# Patient Record
Sex: FEMALE | Race: WHITE | Employment: FULL TIME | URBAN - METROPOLITAN AREA
[De-identification: names, ages, dates, MRNs, and addresses within clinical notes are randomized per-mention and may not be internally consistent; named-entity substitution may affect disease eponyms.]

---

## 2017-02-14 ENCOUNTER — HOSPITAL ENCOUNTER (OUTPATIENT)
Dept: MAMMOGRAPHY | Age: 72
Discharge: HOME | End: 2017-02-14
Attending: INTERNAL MEDICINE
Payer: MEDICARE

## 2017-02-14 DIAGNOSIS — Z12.31 VISIT FOR SCREENING MAMMOGRAM: ICD-10-CM

## 2017-02-14 PROCEDURE — 77067 SCR MAMMO BI INCL CAD: CPT

## 2017-08-21 PROBLEM — J30.9 ALLERGIC RHINITIS: Status: ACTIVE | Noted: 2017-08-21

## 2017-08-21 PROBLEM — E55.9 VITAMIN D DEFICIENCY: Status: ACTIVE | Noted: 2017-08-21

## 2017-08-21 PROBLEM — K57.30 DIVERTICULOSIS OF LARGE INTESTINE WITHOUT HEMORRHAGE: Status: ACTIVE | Noted: 2017-08-21

## 2017-08-21 PROBLEM — C50.919 BREAST CANCER (HCC): Status: ACTIVE | Noted: 2017-08-21

## 2017-08-21 PROBLEM — M19.90 DJD (DEGENERATIVE JOINT DISEASE): Status: ACTIVE | Noted: 2017-08-21

## 2017-08-21 PROBLEM — K64.8 INTERNAL HEMORRHOID: Status: ACTIVE | Noted: 2017-08-21

## 2017-08-21 PROBLEM — M79.89 SWELLING OF THUMB, RIGHT: Status: ACTIVE | Noted: 2017-08-21

## 2017-08-21 PROBLEM — I10 HYPERTENSION: Status: ACTIVE | Noted: 2017-08-21

## 2017-08-21 PROBLEM — M85.80 OSTEOPENIA: Status: ACTIVE | Noted: 2017-08-21

## 2017-08-21 PROBLEM — K21.9 GERD (GASTROESOPHAGEAL REFLUX DISEASE): Status: ACTIVE | Noted: 2017-08-21

## 2017-09-20 ENCOUNTER — OFFICE VISIT (OUTPATIENT)
Dept: INTERNAL MEDICINE CLINIC | Age: 72
End: 2017-09-20

## 2017-09-20 VITALS
BODY MASS INDEX: 25.26 KG/M2 | HEIGHT: 65 IN | DIASTOLIC BLOOD PRESSURE: 70 MMHG | WEIGHT: 151.6 LBS | HEART RATE: 80 BPM | SYSTOLIC BLOOD PRESSURE: 132 MMHG

## 2017-09-20 DIAGNOSIS — E55.9 VITAMIN D DEFICIENCY: ICD-10-CM

## 2017-09-20 DIAGNOSIS — I10 ESSENTIAL HYPERTENSION: Primary | ICD-10-CM

## 2017-09-20 DIAGNOSIS — M15.9 PRIMARY OSTEOARTHRITIS INVOLVING MULTIPLE JOINTS: ICD-10-CM

## 2017-09-20 DIAGNOSIS — K21.9 GASTROESOPHAGEAL REFLUX DISEASE WITHOUT ESOPHAGITIS: ICD-10-CM

## 2017-09-20 DIAGNOSIS — M48.061 LUMBAR SPINAL STENOSIS: ICD-10-CM

## 2017-09-20 LAB
ALBUMIN SERPL-MCNC: 4.3 G/DL (ref 3.9–5.4)
ALKALINE PHOS POC: 107 U/L (ref 38–126)
ALT SERPL-CCNC: 52 U/L (ref 9–52)
AST SERPL-CCNC: 38 U/L (ref 14–36)
BACTERIA UA POCT, BACTPOCT: NORMAL
BILIRUB UR QL STRIP: NEGATIVE
BUN BLD-MCNC: 12 MG/DL (ref 7–17)
CALCIUM BLD-MCNC: 10.1 MG/DL (ref 8.4–10.2)
CASTS UA POCT: NORMAL
CHLORIDE BLD-SCNC: 102 MMOL/L (ref 98–107)
CHOLEST SERPL-MCNC: 169 MG/DL (ref 0–200)
CLUE CELLS, CLUEPOCT: NEGATIVE
CO2 POC: 28 MMOL/L (ref 22–32)
CREAT BLD-MCNC: 0.5 MG/DL (ref 0.7–1.2)
CRYSTALS UA POCT, CRYSPOCT: NEGATIVE
EGFR (POC): 96
EPITHELIAL CELLS POCT, EPITHPOCT: NORMAL
GLUCOSE POC: 82 MG/DL (ref 65–105)
GLUCOSE UR-MCNC: NEGATIVE MG/DL
GRAN# POC: 5.2 K/UL (ref 2–7.8)
GRAN% POC: 68.5 % (ref 37–92)
HCT VFR BLD CALC: 42.9 % (ref 37–51)
HDLC SERPL-MCNC: 66 MG/DL (ref 35–130)
HGB BLD-MCNC: 14.5 G/DL (ref 12–18)
KETONES P FAST UR STRIP-MCNC: NEGATIVE MG/DL
LDL CHOLESTEROL POC: 89 MG/DL (ref 0–130)
LY# POC: 2 K/UL (ref 0.6–4.1)
LY% POC: 26.9 % (ref 10–58.5)
MCH RBC QN: 31 PG (ref 26–32)
MCHC RBC-ENTMCNC: 33.9 G/DL (ref 30–36)
MCV RBC: 92 FL (ref 80–97)
MID #, POC: 0.3 K/UL (ref 0–1.8)
MID% POC: 4.6 % (ref 0.1–24)
MUCUS UA POCT, MUCPOCT: NORMAL
PH UR STRIP: 6.5 [PH] (ref 5–7)
PLATELET # BLD: 334 K/UL (ref 140–440)
POTASSIUM SERPL-SCNC: 4.2 MMOL/L (ref 3.6–5)
PROT SERPL-MCNC: 7.4 G/DL (ref 6.3–8.2)
PROTEIN,URINE POC: NEGATIVE MG/DL
RBC # BLD: 4.68 M/UL (ref 4.2–6.3)
RBC UA POCT, RBCPOCT: NORMAL
SODIUM SERPL-SCNC: 143 MMOL/L (ref 137–145)
SP GR UR STRIP: 1.01 (ref 1.01–1.02)
TCHOL/HDL RATIO (POC): 2.6 (ref 0–4)
TOTAL BILIRUBIN POC: 0.7 MG/DL (ref 0.2–1.3)
TRICH UA POCT, TRICHPOC: NEGATIVE
TRIGL SERPL-MCNC: 70 MG/DL (ref 0–200)
UA UROBILINOGEN AMB POC: NORMAL (ref 0.2–1)
URINALYSIS CLARITY POC: CLEAR
URINALYSIS COLOR POC: NORMAL
URINE BLOOD POC: NEGATIVE
URINE LEUKOCYTES POC: NEGATIVE
URINE NITRITES POC: NEGATIVE
VITAMIN D POC: 41.1 NG/ML (ref 30–96)
VLDLC SERPL CALC-MCNC: 14 MG/DL
WBC # BLD: 7.5 K/UL (ref 4.1–10.9)
WBC UA POCT, WBCPOCT: 0
YEAST UA POCT, YEASTPOC: NEGATIVE

## 2017-09-20 RX ORDER — HYDROCHLOROTHIAZIDE 25 MG/1
25 TABLET ORAL DAILY
Qty: 90 TAB | Refills: 3 | Status: SHIPPED | OUTPATIENT
Start: 2017-09-20 | End: 2018-01-08 | Stop reason: SDUPTHER

## 2017-09-20 RX ORDER — BENAZEPRIL HYDROCHLORIDE 20 MG/1
20 TABLET ORAL DAILY
Qty: 90 TAB | Refills: 3 | Status: SHIPPED | OUTPATIENT
Start: 2017-09-20 | End: 2018-01-08 | Stop reason: SDUPTHER

## 2017-09-20 RX ORDER — AMLODIPINE BESYLATE 5 MG/1
5 TABLET ORAL DAILY
Qty: 90 TAB | Refills: 3 | Status: SHIPPED | OUTPATIENT
Start: 2017-09-20 | End: 2018-01-08 | Stop reason: SDUPTHER

## 2017-09-20 NOTE — PROGRESS NOTES
Subjective: This note will not be viewable in [address]    67 y.o. female for annual Comprehensive personal healthcare examination. Mrs. Savi Anguiano presents to the office today for a comprehensive review of her underlying medical issues. The patient is 67years old and generally has been in fairly good health. Patient does have hypertension for which she is on amlodipine benazepril and hydrochlorothiazide. Patient tolerates this regimen well and blood pressures have been fairly well controlled. The patient denies any cough, swelling, rash, dizziness or lower extremity edema. She denies headaches, numbness, tingling or focal neurological problems. She is on PPI therapy for her GERD. She denies breakthrough heartburn and has had no dysphasia early satiety or unexplained weight loss. She has degenerative arthritis with periodic flares and does take over-the-counter Advil for this. She notes no joint swelling or synovitis. She does have lumbar spinal stenosis and does see a chiropractor for this and this seems to be doing well. She has osteopenia and vitamin D deficiency. She previously had been on Prolia. Patient's had her last bone density a year ago. She will be due for this in 2018. There have been no falls or fractures. The patient has a history of breast cancer and is had 2 right-sided lumpectomies in the past along with radiation therapy and for marital therapy. She is now off of all treatment and did have a mammogram done earlier in the year. She does see oncology for yearly follow-ups. The patient is up-to-date on her pneumococcal vaccinations. She will be due for follow-up colonoscopy in December or later. Her review of systems is otherwise negative is noted. History of present illness: This patient has multiple medical problems.   These include:   Patient Active Problem List   Diagnosis Code    Allergic rhinitis J30.9    Internal hemorrhoid K64.8    Diverticulosis of large intestine without hemorrhage K57.30    Vitamin D deficiency E55.9    Breast cancer (HCC) C50.919    Osteopenia M85.80    GERD (gastroesophageal reflux disease) K21.9    Hypertension I10    DJD (degenerative joint disease) M19.90    Swelling of thumb, right M79.89    Lumbar spinal stenosis M48.06          Past Medical History:   Diagnosis Date    Allergic rhinitis 8/21/2017    Breast cancer ([address] 2009    Right breast     Diverticulosis of large intestine without hemorrhage 8/21/2017    DJD (degenerative joint disease) 8/21/2017    GERD (gastroesophageal reflux disease) 8/21/2017    Hypertension 8/21/2017    Internal hemorrhoid 8/21/2017    Lumbar spinal stenosis 9/20/2017    Osteopenia 8/21/2017    S/P radiation therapy 2009    Swelling of thumb, right 8/21/2017    Vitamin D deficiency 8/21/2017     Past Surgical History:   Procedure Laterality Date    HX BREAST BIOPSY Right 2008    positive ultrasound core bx    HX BREAST BIOPSY Bilateral 2008    benign mri bx    HX BREAST LUMPECTOMY Right 2009    x2    HX COLONOSCOPY      HX DILATION AND CURETTAGE      HX TONSILLECTOMY       Allergies   Allergen Reactions    Sulfa (Sulfonamide Antibiotics) Unknown (comments)     Current Outpatient Prescriptions   Medication Sig Dispense Refill    amLODIPine (NORVASC) 5 mg tablet Take 1 Tab by mouth daily. 90 Tab 3    benazepril (LOTENSIN) 20 mg tablet Take 1 Tab by mouth daily. 90 Tab 3    hydroCHLOROthiazide (HYDRODIURIL) 25 mg tablet Take 1 Tab by mouth daily. 90 Tab 3    omeprazole (PRILOSEC) 20 mg capsule Take 20 mg by mouth daily.  MULTIVITAMIN (MULTIPLE VITAMIN PO) Take  by mouth.        Social History     Social History    Marital status:      Spouse name: N/A    Number of children: 3    Years of education: N/A     Social History Main Topics    Smoking status: Never Smoker    Smokeless tobacco: Never Used    Alcohol use No    Drug use: None    Sexual activity: Not Asked Other Topics Concern    None     Social History Narrative     Family History   Problem Relation Age of Onset    Breast Cancer Sister 61    COPD Mother     Stroke Father        Health Maintenance   Topic Date Due    DTaP/Tdap/Td series (1 - Tdap) 08/21/1965    FOBT Q 1 YEAR AGE 50-75  08/21/1994    ZOSTER VACCINE AGE 60>  06/21/2004    GLAUCOMA SCREENING Q2Y  08/21/2009    OSTEOPOROSIS SCREENING (DEXA)  08/21/2009    MEDICARE YEARLY EXAM  08/21/2009    INFLUENZA AGE 9 TO ADULT  08/01/2017    BREAST CANCER SCRN MAMMOGRAM  02/14/2019    Pneumococcal 65+ High/Highest Risk  Completed       Review of Systems  Constitutional:  She denies fever, weight loss, sweats or fatigue. HEENT:  No blurred or double vision, headache or dizziness. No difficulty with swallowing, taste, speech or smell. Respiratory:  No cough, wheezing or shortness of breath. No sputum production. Cardiac:  Denies chest pain, palpitations, unexplained indigestion, syncope, edema, PND or orthopnea. GI:  No changes in bowel movements, no abdominal pain, no bloating, anorexia, nausea, vomiting or heartburn. :  No frequency or dysuria. Denies incontinence. Extremities:  Positive for joint pain, stiffness but no swelling. Skin:  No recent rashes or mole changes. Neurological:  No numbness, tingling, burning paresthesias or loss of motor strength. No syncope, dizziness, frequent headaches or memory loss. ROS otherwise negative      Objective:     Vitals:    09/20/17 1318   BP: 132/70   Pulse: 80   Weight: 151 lb 9.6 oz (68.8 kg)   Height: 5' 4.5\" (1.638 m)   PainSc:   0 - No pain     Body mass index is 25.62 kg/(m^2). Physical Examination:   General Appearance:  Well-developed, well-nourished, no acute distress. Vision:  Deferred to ophthalmologist.       HEENT:    Ears:  The TMs and ear canals were clear. Eyes:  The pupillary responses were normal.  Extraocular muscle function intact. Lids and conjunctiva not injected. No conjunctiva redness or drainage. Pharynx:  Clear with teeth in good repair. No masses were noted. Neck:  Supple without thyromegaly or adenopathy. No JVD noted. No carotid bruits. Lungs:  Clear to auscultation and percussion. Cardiac:  Regular rate and rhythm without murmur. PMI not displaced. No gallop, rub or click. Abdomen:  Flat, soft, non-tender without palpable organomegaly or mass. No pulsatile mass was felt, and no bruit was heard. Bowel sounds were active. Breasts:  Deferred to GYN  GYN: Deferred to GYN    Rectal exam: Deferred to GYN    Extremities:  No clubbing, cyanosis or edema. Pulses:  Dorsalis pedis and posterior tibial pulses felt without difficulty. Skin:  No rash or unusual mole changes noted. Lymph Nodes:  None felt in the cervical, supraclavicular, axillary or inguinal region. Neurological:  Cranial nerves II-XII grossly intact. Motor strength 5/5. DTRs 2+ and symmetric. Station and gait normal.  Physical exam otherwise negative        Assessment/Plan:     Diagnoses and all orders for this visit:    Essential hypertension  -     amLODIPine (NORVASC) 5 mg tablet; Take 1 Tab by mouth daily. , Normal, Disp-90 Tab, R-3  -     benazepril (LOTENSIN) 20 mg tablet; Take 1 Tab by mouth daily. , Normal, Disp-90 Tab, R-3  -     AMB POC COMPLETE CBC,AUTOMATED ENTER  -     AMB POC COMPREHENSIVE METABOLIC PANEL  -     AMB POC LIPID PROFILE  -     AMB POC URINALYSIS DIP STICK AUTO W/ MICRO   -     WI COLLECTION VENOUS BLOOD,VENIPUNCTURE  -     hydroCHLOROthiazide (HYDRODIURIL) 25 mg tablet; Take 1 Tab by mouth daily. , Normal, Disp-90 Tab, R-3    Gastroesophageal reflux disease without esophagitis    Primary osteoarthritis involving multiple joints    Lumbar spinal stenosis    Vitamin D deficiency  -     AMB POC VITAMIN D        Other instructions: The patient's medications are reviewed and reconciled.   No change in her current medical regimen is made and refills are given as noted.    A no added salt, prudent diet and exercise are encouraged. Colonoscopy will need to be done later this year. Recommend influenza vaccination in October. Await results of multiple labs. Follow-up yearly    Follow-up Disposition:  Return in about 1 year (around 9/20/2018).     Brianna Rodriguez MD

## 2017-09-20 NOTE — PROGRESS NOTES
Faustino De Oliveira is a 67 y.o. female presenting for Complete Physical  .     1. Have you been to the ER, urgent care clinic since your last visit? Hospitalized since your last visit? No    2. Have you seen or consulted any other health care providers outside of the [address] [place] [address] since your last visit? Include any pap smears or colon screening. No    Fall Risk Assessment, last 12 mths 9/20/2017   Able to walk? Yes   Fall in past 12 months? No         No flowsheet data found. PHQ over the last two weeks 9/20/2017   Little interest or pleasure in doing things Not at all   Feeling down, depressed or hopeless Not at all   Total Score PHQ 2 0       There are no discontinued medications.

## 2017-09-20 NOTE — PATIENT INSTRUCTIONS

## 2017-09-20 NOTE — MR AVS SNAPSHOT
Visit Information Date & Time Provider Department Dept. Phone Encounter #  
 9/20/2017  2:00 PM Nickolas Tejeda, [address] [place] [address] [facility] [phone] [id number] Follow-up Instructions Return in about 1 year (around 9/20/2018). Upcoming Health Maintenance Date Due DTaP/Tdap/Td series (1 - Tdap) 8/21/1965 FOBT Q 1 YEAR AGE 50-75 8/21/1994 ZOSTER VACCINE AGE 60> 6/21/2004 GLAUCOMA SCREENING Q2Y 8/21/2009 OSTEOPOROSIS SCREENING (DEXA) 8/21/2009 MEDICARE YEARLY EXAM 8/21/2009 INFLUENZA AGE 9 TO ADULT 8/1/2017 BREAST CANCER SCRN MAMMOGRAM 2/14/2019 Allergies as of 9/20/2017  Review Complete On: 9/20/2017 By: Nickolas Tejeda MD  
  
 Severity Noted Reaction Type Reactions Sulfa (Sulfonamide Antibiotics)  08/21/2017    Unknown (comments) Current Immunizations  Never Reviewed Name Date Influenza Vaccine 10/1/2014 Pneumococcal Conjugate (PCV-13) 9/10/2015 Pneumococcal Vaccine (Unspecified Type) 10/1/2013 Not reviewed this visit You Were Diagnosed With   
  
 Codes Comments Essential hypertension    -  Primary ICD-10-CM: I10 
ICD-9-CM: 401.9 Gastroesophageal reflux disease without esophagitis     ICD-10-CM: K21.9 ICD-9-CM: 530.81 Primary osteoarthritis involving multiple joints     ICD-10-CM: M15.0 ICD-9-CM: 715.09 Lumbar spinal stenosis     ICD-10-CM: M48.06 
ICD-9-CM: 724.02 Vitamin D deficiency     ICD-10-CM: E55.9 ICD-9-CM: 268.9 Vitals BP Pulse Height(growth percentile) Weight(growth percentile) BMI Smoking Status 132/70 (BP 1 Location: Left arm, BP Patient Position: Sitting) 80 5' 4.5\" (1.638 m) 151 lb 9.6 oz (68.8 kg) 25.62 kg/m2 Never Smoker BMI and BSA Data Body Mass Index Body Surface Area  
 25.62 kg/m 2 1.77 m 2 Preferred Pharmacy Pharmacy Name Phone [address] [facility], [address] [address] [place] [address] [address] Your Updated Medication List  
  
   
This list is accurate as of: 9/20/17  2:02 PM.  Always use your most recent med list. amLODIPine 5 mg tablet Commonly known as:  [place] Take 1 Tab by mouth daily. benazepril 20 mg tablet Commonly known as:  LOTENSIN Take 1 Tab by mouth daily. hydroCHLOROthiazide 25 mg tablet Commonly known as:  HYDRODIURIL Take 1 Tab by mouth daily. MULTIPLE VITAMIN PO Take  by mouth. PriLOSEC 20 mg capsule Generic drug:  omeprazole Take 20 mg by mouth daily. Prescriptions Sent to Pharmacy Refills  
 amLODIPine (NORVASC) 5 mg tablet 3 Sig: Take 1 Tab by mouth daily. Class: Normal  
 Pharmacy: [facility] [employer] [address] Ph #: [phone] Route: Oral  
 benazepril (LOTENSIN) 20 mg tablet 3 Sig: Take 1 Tab by mouth daily. Class: Normal  
 Pharmacy: [facility] [address] Ph #: [phone] Route: Oral  
 hydroCHLOROthiazide (HYDRODIURIL) 25 mg tablet 3 Sig: Take 1 Tab by mouth daily. Class: Normal  
 Pharmacy: [facility] [employer] [address] Ph #: [phone] Route: Oral  
  
We Performed the Following AMB POC COMPLETE CBC,AUTOMATED ENTER [id number] CPT(R)] AMB POC COMPREHENSIVE METABOLIC PANEL [11580 CPT(R)] AMB POC LIPID PROFILE [34812 CPT(R)] AMB POC URINALYSIS DIP STICK AUTO W/ MICRO  [08880 CPT(R)] AMB POC VITAMIN D [78727 CPT(R)] MS COLLECTION VENOUS BLOOD,VENIPUNCTURE [id number] CPT(R)] Follow-up Instructions Return in about 1 year (around 9/20/2018). To-Do List   
 02/15/2018 1:30 PM  
  Appointment with [facility] 1 at [address] [place] [address] ([phone]) Shower or bathe using soap and water.   Do not use deodorant, powder, perfumes, or lotion the day of your exam.  If your prior mammograms were not performed at [facility] 6 please bring films with you or forward prior images 2 days before your procedure. If patient is not a callback diagnostic, the patient must have an order/script from the physician for the diagnostic. Please bring it on the day of the mammogram or have it faxed to the department. [facility]  [phone] [facility] [address]  [id number] [address] [phone] [facility] [address] [facility] [phone] Ann [address] [phone] Patient Instructions DASH Diet: Care Instructions Your Care Instructions The DASH diet is an eating plan that can help lower your blood pressure. DASH stands for Dietary Approaches to Stop Hypertension. Hypertension is high blood pressure. The DASH diet focuses on eating foods that are high in calcium, potassium, and magnesium. These nutrients can lower blood pressure. The foods that are highest in these nutrients are fruits, vegetables, low-fat dairy products, nuts, seeds, and legumes. But taking calcium, potassium, and magnesium supplements instead of eating foods that are high in those nutrients does not have the same effect. The DASH diet also includes whole grains, fish, and poultry. The DASH diet is one of several lifestyle changes your doctor may recommend to lower your high blood pressure. Your doctor may also want you to decrease the amount of sodium in your diet. Lowering sodium while following the DASH diet can lower blood pressure even further than just the DASH diet alone. Follow-up care is a key part of your treatment and safety. Be sure to make and go to all appointments, and call your doctor if you are having problems. It's also a good idea to know your test results and keep a list of the medicines you take. How can you care for yourself at home? Following the DASH diet · Eat 4 to 5 servings of fruit each day.  A serving is 1 medium-sized piece of fruit, ½ cup chopped or canned fruit, 1/4 cup dried fruit, or 4 ounces (½ cup) of fruit juice. Choose fruit more often than fruit juice. · Eat 4 to 5 servings of vegetables each day. A serving is 1 cup of lettuce or raw leafy vegetables, ½ cup of chopped or cooked vegetables, or 4 ounces (½ cup) of vegetable juice. Choose vegetables more often than vegetable juice. · Get 2 to 3 servings of low-fat and fat-free dairy each day. A serving is 8 ounces of milk, 1 cup of yogurt, or 1 ½ ounces of cheese. · Eat 6 to 8 servings of grains each day. A serving is 1 slice of bread, 1 ounce of dry cereal, or ½ cup of cooked rice, pasta, or cooked cereal. Try to choose whole-grain products as much as possible. · Limit lean meat, poultry, and fish to 2 servings each day. A serving is 3 ounces, about the size of a deck of cards. · Eat 4 to 5 servings of nuts, seeds, and legumes (cooked dried beans, lentils, and split peas) each week. A serving is 1/3 cup of nuts, 2 tablespoons of seeds, or ½ cup of cooked beans or peas. · Limit fats and oils to 2 to 3 servings each day. A serving is 1 teaspoon of vegetable oil or 2 tablespoons of salad dressing. · Limit sweets and added sugars to 5 servings or less a week. A serving is 1 tablespoon jelly or jam, ½ cup sorbet, or 1 cup of lemonade. · Eat less than 2,300 milligrams (mg) of sodium a day. If you limit your sodium to 1,500 mg a day, you can lower your blood pressure even more. Tips for success · Start small. Do not try to make dramatic changes to your diet all at once. You might feel that you are missing out on your favorite foods and then be more likely to not follow the plan. Make small changes, and stick with them. Once those changes become habit, add a few more changes. · Try some of the following: ¨ Make it a goal to eat a fruit or vegetable at every meal and at snacks. This will make it easy to get the recommended amount of fruits and vegetables each day. ¨ Try yogurt topped with fruit and nuts for a snack or healthy dessert. ¨ Add lettuce, tomato, cucumber, and onion to sandwiches. ¨ Combine a ready-made pizza crust with low-fat mozzarella cheese and lots of vegetable toppings. Try using tomatoes, squash, spinach, broccoli, carrots, cauliflower, and onions. ¨ Have a variety of cut-up vegetables with a low-fat dip as an appetizer instead of chips and dip. ¨ Sprinkle sunflower seeds or chopped almonds over salads. Or try adding chopped walnuts or almonds to cooked vegetables. ¨ Try some vegetarian meals using beans and peas. Add garbanzo or kidney beans to salads. Make burritos and tacos with mashed tinsley beans or black beans. Where can you learn more? Go to http://francis-elise.info/. Enter U593 in the search box to learn more about \"DASH Diet: Care Instructions. \" Current as of: April 3, 2017 Content Version: 11.3 © 7604-5444 [employer]. Care instructions adapted under license by [employer] (which disclaims liability or warranty for this information). If you have questions about a medical condition or this instruction, always ask your healthcare professional. [address] any warranty or liability for your use of this information. Introducing [facility]! Camille Saldana introduces [employer] patient portal. Now you can access parts of your medical record, email your doctor's office, and request medication refills online. 1. In your internet browser, go to [email or web address] 2. Click on the First Time User? Click Here link in the Sign In box. You will see the New Member Sign Up page. 3. Enter your [employer] Access Code exactly as it appears below. You will not need to use this code after youve completed the sign-up process. If you do not sign up before the expiration date, you must request a new code. · [employer] Access Code: [email or web address] Expires: 12/19/2017 12:48 PM 
 
 4. Enter the last four digits of your Social Security Number (xxxx) and Date of Birth (mm/dd/yyyy) as indicated and click Submit. You will be taken to the next sign-up page. 5. Create a [employer]elli[employer] ID. This will be your [employer] login ID and cannot be changed, so think of one that is secure and easy to remember. 6. Create a [employer] password. You can change your password at any time. 7. Enter your Password Reset Question and Answer. This can be used at a later time if you forget your password. 8. Enter your e-mail address. You will receive e-mail notification when new information is available in [address] 9. Click Sign Up. You can now view and download portions of your medical record. 10. Click the Download Summary menu link to download a portable copy of your medical information. If you have questions, please visit the Frequently Asked Questions section of the [employer] website. Remember, [employer] is NOT to be used for urgent needs. For medical emergencies, dial 911. Now available from your iPhone and Android! Please provide this summary of care documentation to your next provider. Your primary care clinician is listed as JERROD Hutchinson 21. If you have any questions after today's visit, please call [phone].

## 2018-01-08 ENCOUNTER — OFFICE VISIT (OUTPATIENT)
Dept: INTERNAL MEDICINE CLINIC | Age: 73
End: 2018-01-08

## 2018-01-08 VITALS
DIASTOLIC BLOOD PRESSURE: 66 MMHG | WEIGHT: 153 LBS | OXYGEN SATURATION: 98 % | HEIGHT: 65 IN | SYSTOLIC BLOOD PRESSURE: 126 MMHG | BODY MASS INDEX: 25.49 KG/M2 | HEART RATE: 83 BPM

## 2018-01-08 DIAGNOSIS — I10 ESSENTIAL HYPERTENSION: ICD-10-CM

## 2018-01-08 DIAGNOSIS — N39.0 URINARY TRACT INFECTION WITHOUT HEMATURIA, SITE UNSPECIFIED: Primary | ICD-10-CM

## 2018-01-08 LAB
BACTERIA UA POCT, BACTPOCT: NORMAL
BILIRUB UR QL STRIP: NEGATIVE
CASTS UA POCT: NORMAL
CLUE CELLS, CLUEPOCT: NEGATIVE
CRYSTALS UA POCT, CRYSPOCT: NEGATIVE
EPITHELIAL CELLS POCT: NORMAL
GLUCOSE UR-MCNC: NEGATIVE MG/DL
KETONES P FAST UR STRIP-MCNC: NEGATIVE MG/DL
MUCUS UA POCT, MUCPOCT: NORMAL
PH UR STRIP: 6.5 [PH] (ref 5–7)
PROT UR QL STRIP: NEGATIVE
RBC UA POCT, RBCPOCT: 0
SP GR UR STRIP: 1.01 (ref 1.01–1.02)
TRICH UA POCT, TRICHPOC: NEGATIVE
UA UROBILINOGEN AMB POC: NORMAL (ref 0.2–1)
URINALYSIS CLARITY POC: CLEAR
URINALYSIS COLOR POC: YELLOW
URINE BLOOD POC: NEGATIVE
URINE CULT COMMENT, POCT: NORMAL
URINE LEUKOCYTES POC: NORMAL
URINE NITRITES POC: NEGATIVE
WBC UA POCT, WBCPOCT: NORMAL
YEAST UA POCT, YEASTPOC: NEGATIVE

## 2018-01-08 RX ORDER — BENAZEPRIL HYDROCHLORIDE 20 MG/1
20 TABLET ORAL DAILY
Qty: 90 TAB | Refills: 3 | Status: SHIPPED | OUTPATIENT
Start: 2018-01-08 | End: 2019-01-17 | Stop reason: SDUPTHER

## 2018-01-08 RX ORDER — CIPROFLOXACIN 250 MG/1
250 TABLET, FILM COATED ORAL 2 TIMES DAILY
Qty: 14 TAB | Refills: 0 | Status: SHIPPED | OUTPATIENT
Start: 2018-01-08 | End: 2018-01-15

## 2018-01-08 RX ORDER — AMLODIPINE BESYLATE 5 MG/1
5 TABLET ORAL DAILY
Qty: 90 TAB | Refills: 3 | Status: SHIPPED | OUTPATIENT
Start: 2018-01-08 | End: 2019-01-17 | Stop reason: SDUPTHER

## 2018-01-08 RX ORDER — HYDROCHLOROTHIAZIDE 25 MG/1
25 TABLET ORAL DAILY
Qty: 90 TAB | Refills: 3 | Status: SHIPPED | OUTPATIENT
Start: 2018-01-08 | End: 2019-01-17 | Stop reason: SDUPTHER

## 2018-01-08 NOTE — PROGRESS NOTES
Héctor Aguirre is a 67 y.o. female presenting for Bladder Infection  . 1. Have you been to the ER, urgent care clinic since your last visit? Hospitalized since your last visit? No    2. Have you seen or consulted any other health care providers outside of the [address] [place] [address] since your last visit? Include any pap smears or colon screening. No    Fall Risk Assessment, last 12 mths 9/20/2017   Able to walk? Yes   Fall in past 12 months? No         Abuse Screening Questionnaire 9/20/2017   Do you ever feel afraid of your partner? N   Are you in a relationship with someone who physically or mentally threatens you? N   Is it safe for you to go home? Y       PHQ over the last two weeks 9/20/2017   Little interest or pleasure in doing things Not at all   Feeling down, depressed or hopeless Not at all   Total Score PHQ 2 0       There are no discontinued medications.

## 2018-01-08 NOTE — PROGRESS NOTES
Subjective: This note will not be viewable in [address] The patient presents to the office with complaints of a possible UTI. Complains of dysuria, frequency and urgency ongoing for 10 days. Denies hematuria, abdominal pain or flank pain. No fever, chills, nausea or vomiting. Past Medical History:   Diagnosis Date    Allergic rhinitis 8/21/2017    Breast cancer ([address] 2009    Right breast     Diverticulosis of large intestine without hemorrhage 8/21/2017    DJD (degenerative joint disease) 8/21/2017    GERD (gastroesophageal reflux disease) 8/21/2017    Hypertension 8/21/2017    Internal hemorrhoid 8/21/2017    Lumbar spinal stenosis 9/20/2017    Osteopenia 8/21/2017    S/P radiation therapy 2009    Swelling of thumb, right 8/21/2017    Vitamin D deficiency 8/21/2017     Past Surgical History:   Procedure Laterality Date    HX BREAST BIOPSY Right 2008    positive ultrasound core bx    HX BREAST BIOPSY Bilateral 2008    benign mri bx    HX BREAST LUMPECTOMY Right 2009    x2    HX COLONOSCOPY      HX DILATION AND CURETTAGE      HX TONSILLECTOMY       Allergies   Allergen Reactions    Sulfa (Sulfonamide Antibiotics) Unknown (comments)     Current Outpatient Prescriptions   Medication Sig Dispense Refill    hydroCHLOROthiazide (HYDRODIURIL) 25 mg tablet Take 1 Tab by mouth daily. 90 Tab 3    benazepril (LOTENSIN) 20 mg tablet Take 1 Tab by mouth daily. 90 Tab 3    amLODIPine (NORVASC) 5 mg tablet Take 1 Tab by mouth daily. 90 Tab 3    ciprofloxacin HCl (CIPRO) 250 mg tablet Take 1 Tab by mouth two (2) times a day for 7 days. 14 Tab 0    omeprazole (PRILOSEC) 20 mg capsule Take 20 mg by mouth daily.  MULTIVITAMIN (MULTIPLE VITAMIN PO) Take  by mouth.        Social History     Social History    Marital status:      Spouse name: N/A    Number of children: 3    Years of education: N/A     Social History Main Topics    Smoking status: Never Smoker    Smokeless tobacco: Never Used  Alcohol use No    Drug use: None    Sexual activity: Not Asked     Other Topics Concern    None     Social History Narrative     Family History   Problem Relation Age of Onset    Breast Cancer Sister 61    COPD Mother     Stroke Father        Review of Systems:  GEN: no fever,chills or sweats  CV: no PND, orthopnea, or chest pain  Resp: no dyspnea on exertion, no cough  Abd: no nausea, vomiting or diarrhea or abdominal pain  Back: denies flank pain  : positive for dysuria, frequency and urgency  Neurological ROS: no TIA or stroke symptoms  ROS otherwise negative      Objective:     Visit Vitals    /66 (BP 1 Location: Left arm, BP Patient Position: Sitting)    Pulse 83    Ht 5' 4.5\" (1.638 m)    Wt 153 lb (69.4 kg)    SpO2 98%    BMI 25.86 kg/m2     Body mass index is 25.86 kg/(m^2). General:   alert, cooperative and no distress   Skin: No rash appreciated   Neck: Supple   Heart: RRR without murmur   Lungs: Clear to auscultation bilaterally   Abdomen: Soft, nontender. Normal bowel sounds   Back: No CVAT present   Neuro: No focal deficits appreciated     Physical exam otherwise negative         Assessment/Plan:     Diagnoses and all orders for this visit:    Urinary tract infection without hematuria, site unspecified  -     hydroCHLOROthiazide (HYDRODIURIL) 25 mg tablet; Take 1 Tab by mouth daily. , Normal, Disp-90 Tab, R-3  -     benazepril (LOTENSIN) 20 mg tablet; Take 1 Tab by mouth daily. , Normal, Disp-90 Tab, R-3  -     amLODIPine (NORVASC) 5 mg tablet; Take 1 Tab by mouth daily. , Normal, Disp-90 Tab, R-3  -     CULTURE, URINE  -     AMB POC URINALYSIS DIP STICK AUTO W/ MICRO   -     ciprofloxacin HCl (CIPRO) 250 mg tablet; Take 1 Tab by mouth two (2) times a day for 7 days. , Normal, Disp-14 Tab, R-0    Essential hypertension  -     hydroCHLOROthiazide (HYDRODIURIL) 25 mg tablet; Take 1 Tab by mouth daily. , Normal, Disp-90 Tab, R-3  -     benazepril (LOTENSIN) 20 mg tablet;  Take 1 Tab by mouth daily. , Normal, Disp-90 Tab, R-3  -     amLODIPine (NORVASC) 5 mg tablet; Take 1 Tab by mouth daily. , Normal, Disp-90 Tab, R-3        Other instructions:   Await results of urine culture    Azo-standard as needed for discomfort    Increase po fluids    Follow-up Disposition:  Return if symptoms worsen or fail to improve.     Jonathan Kramer MD

## 2018-01-08 NOTE — MR AVS SNAPSHOT
Visit Information Date & Time Provider Department Dept. Phone Encounter #  
 1/8/2018  2:50 PM Yady Solano MD [facility] [id number] Follow-up Instructions Return if symptoms worsen or fail to improve. Follow-up and Disposition History Upcoming Health Maintenance Date Due DTaP/Tdap/Td series (1 - Tdap) 8/21/1965 FOBT Q 1 YEAR AGE 50-75 8/21/1994 ZOSTER VACCINE AGE 60> 6/21/2004 GLAUCOMA SCREENING Q2Y 8/21/2009 OSTEOPOROSIS SCREENING (DEXA) 8/21/2009 MEDICARE YEARLY EXAM 8/21/2009 BREAST CANCER SCRN MAMMOGRAM 2/14/2019 Allergies as of 1/8/2018  Review Complete On: 1/8/2018 By: Yady Solano MD  
  
 Severity Noted Reaction Type Reactions Sulfa (Sulfonamide Antibiotics)  08/21/2017    Unknown (comments) Current Immunizations  Never Reviewed Name Date Influenza Vaccine 10/25/2017, 10/1/2014 Pneumococcal Conjugate (PCV-13) 9/10/2015 Pneumococcal Vaccine (Unspecified Type) 10/1/2013 Not reviewed this visit You Were Diagnosed With   
  
 Codes Comments Urinary tract infection without hematuria, site unspecified    -  Primary ICD-10-CM: N39.0 ICD-9-CM: 599.0 Essential hypertension     ICD-10-CM: I10 
ICD-9-CM: 401.9 Vitals BP Pulse Height(growth percentile) Weight(growth percentile) SpO2 BMI  
 126/66 (BP 1 Location: Left arm, BP Patient Position: Sitting) 83 5' 4.5\" (1.638 m) 153 lb (69.4 kg) 98% 25.86 kg/m2 Smoking Status Never Smoker Vitals History BMI and BSA Data Body Mass Index Body Surface Area  
 25.86 kg/m 2 1.78 m 2 Preferred Pharmacy Pharmacy Name [facility] [address] [facility] [phone] Your Updated Medication List  
  
   
This list is accurate as of: 1/8/18  3:28 PM.  Always use your most recent med list.  
  
  
  
  
 amLODIPine 5 mg tablet Commonly known as:  Ratna Callander Take 1 Tab by mouth daily. benazepril 20 mg tablet Commonly known as:  LOTENSIN Take 1 Tab by mouth daily. ciprofloxacin HCl 250 mg tablet Commonly known as:  CIPRO Take 1 Tab by mouth two (2) times a day for 7 days. hydroCHLOROthiazide 25 mg tablet Commonly known as:  HYDRODIURIL Take 1 Tab by mouth daily. MULTIPLE VITAMIN PO Take  by mouth. PriLOSEC 20 mg capsule Generic drug:  omeprazole Take 20 mg by mouth daily. Prescriptions Sent to Pharmacy Refills  
 hydroCHLOROthiazide (HYDRODIURIL) 25 mg tablet 3 Sig: Take 1 Tab by mouth daily. Class: Normal  
 Pharmacy: [facility] [address] TODD CASIANO [address] [place] [address] Ph #: [phone] Route: Oral  
 benazepril (LOTENSIN) 20 mg tablet 3 Sig: Take 1 Tab by mouth daily. Class: Normal  
 Pharmacy: [facility] [address] TODD CASIANO [address] [place] [address] Ph #: [phone] Route: Oral  
 amLODIPine (NORVASC) 5 mg tablet 3 Sig: Take 1 Tab by mouth daily. Class: Normal  
 Pharmacy: [facility] [address] CASIANO [address] [place] [address] Ph #: [phone] Route: Oral  
 ciprofloxacin HCl (CIPRO) 250 mg tablet 0 Sig: Take 1 Tab by mouth two (2) times a day for 7 days. Class: Normal  
 Pharmacy: [facility] [address] TODD CASIANO [address] [place] [address] [facility] Ph #: [phone] Route: Oral  
  
We Performed the Following AMB POC URINALYSIS DIP STICK AUTO W/ MICRO  [58263 CPT(R)] CULTURE, URINE [id number] CPT(R)] Follow-up Instructions Return if symptoms worsen or fail to improve. To-Do List   
 02/15/2018 1:30 PM  
  Appointment with [address] [address] at [address] [place] [address] ([phone]) Shower or bathe using soap and water.   Do not use deodorant, powder, perfumes, or lotion the day of your exam.  If your prior mammograms were not performed at [facility] please bring films with you or forward prior images 2 days before your procedure. If patient is not a callback diagnostic, the patient must have an order/script from the physician for the diagnostic. Please bring it on the day of the mammogram or have it faxed to the department. [facility]  [phone] [facility] [address]  [phone]  [phone] [facility] [address] [facility] [phone] [facility] [phone] Patient Instructions Urinary Tract Infection in Women: Care Instructions Your Care Instructions A urinary tract infection, or UTI, is a general term for an infection anywhere between the kidneys and the urethra (where urine comes out). Most UTIs are bladder infections. They often cause pain or burning when you urinate. UTIs are caused by bacteria and can be cured with antibiotics. Be sure to complete your treatment so that the infection goes away. Follow-up care is a key part of your treatment and safety. Be sure to make and go to all appointments, and call your doctor if you are having problems. It's also a good idea to know your test results and keep a list of the medicines you take. How can you care for yourself at home? · Take your antibiotics as directed. Do not stop taking them just because you feel better. You need to take the full course of antibiotics. · Drink extra water and other fluids for the next day or two. This may help wash out the bacteria that are causing the infection. (If you have kidney, heart, or liver disease and have to limit fluids, talk with your doctor before you increase your fluid intake.) · Avoid drinks that are carbonated or have caffeine. They can irritate the bladder. · Urinate often. Try to empty your bladder each time. · To relieve pain, take a hot bath or lay a heating pad set on low over your lower belly or genital area. Never go to sleep with a heating pad in place. To prevent UTIs · Drink plenty of water each day. This helps you urinate often, which clears bacteria from your system. (If you have kidney, heart, or liver disease and have to limit fluids, talk with your doctor before you increase your fluid intake.) · Urinate when you need to. · Urinate right after you have sex. · Change sanitary pads often. · Avoid douches, bubble baths, feminine hygiene sprays, and other feminine hygiene products that have deodorants. · After going to the bathroom, wipe from front to back. When should you call for help? Call your doctor now or seek immediate medical care if: 
? · Symptoms such as fever, chills, nausea, or vomiting get worse or appear for the first time. ? · You have new pain in your back just below your rib cage. This is called flank pain. ? · There is new blood or pus in your urine. ? · You have any problems with your antibiotic medicine. ? Watch closely for changes in your health, and be sure to contact your doctor if: 
? · You are not getting better after taking an antibiotic for 2 days. ? · Your symptoms go away but then come back. Where can you learn more? Go to http://farncis-elise.info/. Enter H461 in the search box to learn more about \"Urinary Tract Infection in Women: Care Instructions. \" Current as of: May 12, 2017 Content Version: 11.4 © 8523-4767 [employer]. Care instructions adapted under license by [employer] (which disclaims liability or warranty for this information). If you have questions about a medical condition or this instruction, always ask your healthcare professional. [place] [address] any warranty or liability for your use of this information. Patient Instructions History Introducing [facility]! Mounika Callejas introduces [employer] patient portal. Now you can access parts of your medical record, email your doctor's office, and request medication refills online. 1. In your internet browser, go to [email or web address] [email or web address] 2. Click on the First Time User? Click Here link in the Sign In box. You will see the New Member Sign Up page. 3. Enter your [employer] Access Code exactly as it appears below. You will not need to use this code after youve completed the sign-up process. If you do not sign up before the expiration date, you must request a new code. · [employer] Access Code: [email or web address] Expires: 4/8/2018  2:40 PM 
 
4. Enter the last four digits of your Social Security Number (xxxx) and Date of Birth (mm/dd/yyyy) as indicated and click Submit. You will be taken to the next sign-up page. 5. Create a Fotoupt ID. This will be your [employer] login ID and cannot be changed, so think of one that is secure and easy to remember. 6. Create a [employer] password. You can change your password at any time. 7. Enter your Password Reset Question and Answer. This can be used at a later time if you forget your password. 8. Enter your e-mail address. You will receive e-mail notification when new information is available in [address] 9. Click Sign Up. You can now view and download portions of your medical record. 10. Click the Download Summary menu link to download a portable copy of your medical information. If you have questions, please visit the Frequently Asked Questions section of the [employer] website. Remember, [employer] is NOT to be used for urgent needs. For medical emergencies, dial 911. Now available from your iPhone and Android! Please provide this summary of care documentation to your next provider. Your primary care clinician is listed as JERROD Hutchinson 21. If you have any questions after today's visit, please call [phone].

## 2018-01-08 NOTE — PATIENT INSTRUCTIONS

## 2018-01-09 LAB — BACTERIA UR CULT: NORMAL

## 2018-02-15 ENCOUNTER — HOSPITAL ENCOUNTER (OUTPATIENT)
Dept: MAMMOGRAPHY | Age: 73
Discharge: HOME | End: 2018-02-15
Attending: INTERNAL MEDICINE
Payer: MEDICARE

## 2018-02-15 DIAGNOSIS — Z85.3 PERSONAL HISTORY OF MALIGNANT NEOPLASM OF BREAST: ICD-10-CM

## 2018-02-15 PROCEDURE — 77066 DX MAMMO INCL CAD BI: CPT

## 2018-04-05 ENCOUNTER — OFFICE VISIT (OUTPATIENT)
Dept: INTERNAL MEDICINE CLINIC | Age: 73
End: 2018-04-05

## 2018-04-05 VITALS
OXYGEN SATURATION: 98 % | HEART RATE: 78 BPM | SYSTOLIC BLOOD PRESSURE: 126 MMHG | HEIGHT: 65 IN | BODY MASS INDEX: 24.99 KG/M2 | DIASTOLIC BLOOD PRESSURE: 72 MMHG | RESPIRATION RATE: 16 BRPM | TEMPERATURE: 97.8 F | WEIGHT: 150 LBS

## 2018-04-05 DIAGNOSIS — M26.609 TMJ DYSFUNCTION: Primary | ICD-10-CM

## 2018-04-05 RX ORDER — MELOXICAM 15 MG/1
15 TABLET ORAL DAILY
Qty: 30 TAB | Refills: 0 | Status: SHIPPED | OUTPATIENT
Start: 2018-04-05 | End: 2019-01-17 | Stop reason: ALTCHOICE

## 2018-04-05 NOTE — PROGRESS NOTES
This note will not be viewable in [address] Subjective:     Mrs. Cathleen Goldberg presents to the office today with complaints of pain in the right side of her face and in her right ear. It is been present over the last week. She has noted no facial swelling. She has had no sore throat. She denies any pain in her teeth. There is been no neck stiffness. She does have a history of TMJ dysfunction. Past Medical History:   Diagnosis Date    Allergic rhinitis 8/21/2017    Breast cancer ([address] 2009    Right breast     Diverticulosis of large intestine without hemorrhage 8/21/2017    DJD (degenerative joint disease) 8/21/2017    GERD (gastroesophageal reflux disease) 8/21/2017    Hypertension 8/21/2017    Internal hemorrhoid 8/21/2017    Lumbar spinal stenosis 9/20/2017    Osteopenia 8/21/2017    S/P radiation therapy 2009    Swelling of thumb, right 8/21/2017    TMJ dysfunction 4/5/2018    Vitamin D deficiency 8/21/2017     Past Surgical History:   Procedure Laterality Date    HX BREAST BIOPSY Right 2008    positive ultrasound core bx    HX BREAST BIOPSY Bilateral 2008    benign mri bx    HX BREAST LUMPECTOMY Right 2009    x2    HX COLONOSCOPY      HX DILATION AND CURETTAGE      HX TONSILLECTOMY       Allergies   Allergen Reactions    Sulfa (Sulfonamide Antibiotics) Unknown (comments)     Current Outpatient Prescriptions   Medication Sig Dispense Refill    meloxicam (MOBIC) 15 mg tablet Take 1 Tab by mouth daily. 30 Tab 0    hydroCHLOROthiazide (HYDRODIURIL) 25 mg tablet Take 1 Tab by mouth daily. 90 Tab 3    benazepril (LOTENSIN) 20 mg tablet Take 1 Tab by mouth daily. 90 Tab 3    amLODIPine (NORVASC) 5 mg tablet Take 1 Tab by mouth daily. 90 Tab 3    omeprazole (PRILOSEC) 20 mg capsule Take 20 mg by mouth daily.  MULTIVITAMIN (MULTIPLE VITAMIN PO) Take  by mouth.        Social History     Social History    Marital status:      Spouse name: N/A    Number of children: 3    Years of education: N/A     Social History Main Topics    Smoking status: Never Smoker    Smokeless tobacco: Never Used    Alcohol use No    Drug use: None    Sexual activity: Not Asked     Other Topics Concern    None     Social History Narrative     Family History   Problem Relation Age of Onset    Breast Cancer Sister 61    COPD Mother     Stroke Father        Review of Systems:  GEN: no weight loss, weight gain, fatigue or night sweats  ENT: Complains of right facial and ear pain  CV: no PND, orthopnea, or palpitations  Resp: no dyspnea on exertion, no cough    ROS otherwise negative      Objective:     Visit Vitals    /72    Pulse 78    Temp 97.8 °F (36.6 °C) (Oral)    Resp 16    Ht 5' 4.5\" (1.638 m)    Wt 150 lb (68 kg)    LMP  (LMP Unknown)    SpO2 98%    BMI 25.35 kg/m2     Body mass index is 25.35 kg/(m^2). General:   alert, cooperative and no distress   E ENT: conjunctivae/sclerae clear. PERRL, EOM's intact. The TMs and ear canals were clear. The pharynx was nonerythematous. There was no parotid gland swelling. Pain was reproducible with palpation over the right TMJ region. Her neck was supple without palpable nodes. Mouth:  No oral lesions, no pharyngeal erythema, no exudates   Neck: Trachea midline, no thyromegaly, no bruits   Heart: S1 and S2 normal,no murmurs noted    Lungs: Clear to auscultation bilaterally, no increased work of breathing     Physical exam otherwise negative         Assessment/Plan:     Diagnoses and all orders for this visit:    TMJ dysfunction  -     meloxicam (MOBIC) 15 mg tablet; Take 1 Tab by mouth daily. , Normal, Disp-30 Tab, R-0        Other instructions: The patient has tap tenderness over the right TMJ region. I have asked her to chew only soft foods, nothing chewy or hard on the right side where she is tender.     Moist heat to be applied to the right TMJ region    Have prescribed meloxicam to be taken daily    Follow-up if there is no improvement    Follow-up Disposition:  Return if symptoms worsen or fail to improve.     Nickolas Tejeda MD

## 2018-04-05 NOTE — PROGRESS NOTES
Chief Complaint   Patient presents with    Ear Pain     right ear       1. Have you been to the ER, urgent care clinic since your last visit? Hospitalized since your last visit? no    2. Have you seen or consulted any other health care providers outside of the [address] [place] [address] since your last visit? Include any pap smears or colon screening.  no

## 2018-04-05 NOTE — PATIENT INSTRUCTIONS
Temporomandibular Disorder: Care Instructions  Your Care Instructions    Temporomandibular (TM) disorders are a problem with the muscles and joints that connect your jaw to your skull. They cause pain when you open your mouth, chew, or yawn. You may feel this pain on one or both sides. TM disorders are often caused by tight jaw muscles. The tightness can be caused by clenching or grinding your teeth. This may happen when you have a lot of stress in your life. If you lower your stress, you may be able to stop clenching or grinding your teeth. This will help relax your jaw and reduce your pain. You may also be able to do some things at home to feel better. But if none of this works, your doctor may prescribe medicine to help relax your muscles and control the pain. Follow-up care is a key part of your treatment and safety. Be sure to make and go to all appointments, and call your doctor if you are having problems. It's also a good idea to know your test results and keep a list of the medicines you take. How can you care for yourself at home? · Put a warm, moist cloth or heating pad set on low on your jaw. Do this for 10 to 20 minutes at a time. Put a thin cloth between the heating pad and your skin. · Avoid hard or chewy foods that cause your jaws to work very hard. Examples include popcorn, jerky, tough meats, chewy breads, gum, and raw apples and carrots. · Choose softer foods that are easy to chew. These include eggs, yogurt, and soup. · Cut your food into small pieces. Chew slowly. · If your jaw gets too painful to chew, or if it locks, you may need to puree your food for a few days or weeks. · To relax your jaw, repeat this exercise for a few minutes every morning and evening. Watch yourself in a mirror. Gently open and close your mouth. Move your jaw straight up and down. But don't do this if it makes your pain worse.   · Get at least 30 minutes of exercise on most days of the week to relieve stress. Walking is a good choice. You also may want to do other activities, such as running, swimming, cycling, or playing tennis or team sports. · Do not:  ¨ Hold a phone between your shoulder and your jaw. ¨ Open your mouth all the way, like when you sing loudly or yawn. ¨ Clench or grind your teeth, bite your lips, or chew your fingernails. ¨ Clench things such as pens, pipes, or cigars between your teeth. When should you call for help? Call your doctor now or seek immediate medical care if:  ? · Your jaw is locked open or shut or it is hard to move your jaw. ? Watch closely for changes in your health, and be sure to contact your doctor if:  ? · Your jaw pain gets worse. ? · Your face is swollen. ? · You do not get better as expected. Where can you learn more? Go to http://francis-elise.info/. Enter L393 in the search box to learn more about \"Temporomandibular Disorder: Care Instructions. \"  Current as of: May 12, 2017  Content Version: 11.4  © 9574-2719 [employer]. Care instructions adapted under license by [employer] (which disclaims liability or warranty for this information). If you have questions about a medical condition or this instruction, always ask your healthcare professional. [address] any warranty or liability for your use of this information.

## 2018-04-05 NOTE — MR AVS SNAPSHOT
[address] [address] [address] [address] [phone] Patient: Radhika Bolton MRN: [id number] [id number] Visit Information Date & Time Provider Department Dept. Phone Encounter #  
 4/5/2018  1:10 PM Javier Kasper MD [facility] [id number] Follow-up Instructions Return if symptoms worsen or fail to improve. Upcoming Health Maintenance Date Due DTaP/Tdap/Td series (1 - Tdap) 8/21/1965 FOBT Q 1 YEAR AGE 50-75 8/21/1994 ZOSTER VACCINE AGE 60> 6/21/2004 GLAUCOMA SCREENING Q2Y 8/21/2009 Bone Densitometry (Dexa) Screening 8/21/2009 MEDICARE YEARLY EXAM 3/14/2018 BREAST CANCER SCRN MAMMOGRAM 2/15/2020 Allergies as of 4/5/2018  Review Complete On: 4/5/2018 By: Javier Kasper MD  
  
 Severity Noted Reaction Type Reactions Sulfa (Sulfonamide Antibiotics)  08/21/2017    Unknown (comments) Current Immunizations  Never Reviewed Name Date Influenza Vaccine 10/25/2017, 10/1/2014 Pneumococcal Conjugate (PCV-13) 9/10/2015 Pneumococcal Vaccine (Unspecified Type) 10/1/2013 Not reviewed this visit You Were Diagnosed With   
  
 Codes Comments TMJ dysfunction    -  Primary ICD-10-CM: M26.609 ICD-9-CM: 524.60 Vitals BP Pulse Temp Resp Height(growth percentile) Weight(growth percentile) 126/72 78 97.8 °F (36.6 °C) (Oral) 16 5' 4.5\" (1.638 m) 150 lb (68 kg) LMP SpO2 BMI OB Status Smoking Status (LMP Unknown) 98% 25.35 kg/m2 Postmenopausal Never Smoker Vitals History BMI and BSA Data Body Mass Index Body Surface Area  
 25.35 kg/m 2 1.76 m 2 Preferred Pharmacy Pharmacy Name Phone [facility] [phone] [facility] [phone] Your Updated Medication List  
  
   
This list is accurate as of 4/5/18  1:33 PM.  Always use your most recent med list. amLODIPine 5 mg tablet Commonly known as:  Luz Elena Radneyda Take 1 Tab by mouth daily. benazepril 20 mg tablet Commonly known as:  LOTENSIN Take 1 Tab by mouth daily. hydroCHLOROthiazide 25 mg tablet Commonly known as:  HYDRODIURIL Take 1 Tab by mouth daily. meloxicam 15 mg tablet Commonly known as:  MOBIC Take 1 Tab by mouth daily. MULTIPLE VITAMIN PO Take  by mouth. PriLOSEC 20 mg capsule Generic drug:  omeprazole Take 20 mg by mouth daily. Prescriptions Sent to Pharmacy Refills  
 meloxicam (MOBIC) 15 mg tablet 0 Sig: Take 1 Tab by mouth daily. Class: Normal  
 Pharmacy: [facility] [address] CASIANO [address] [facility], [address] [facility] #: [phone] Route: Oral  
  
Follow-up Instructions Return if symptoms worsen or fail to improve. Patient Instructions Temporomandibular Disorder: Care Instructions Your Care Instructions Temporomandibular (TM) disorders are a problem with the muscles and joints that connect your jaw to your skull. They cause pain when you open your mouth, chew, or yawn. You may feel this pain on one or both sides. TM disorders are often caused by tight jaw muscles. The tightness can be caused by clenching or grinding your teeth. This may happen when you have a lot of stress in your life. If you lower your stress, you may be able to stop clenching or grinding your teeth. This will help relax your jaw and reduce your pain. You may also be able to do some things at home to feel better. But if none of this works, your doctor may prescribe medicine to help relax your muscles and control the pain. Follow-up care is a key part of your treatment and safety. Be sure to make and go to all appointments, and call your doctor if you are having problems. It's also a good idea to know your test results and keep a list of the medicines you take. How can you care for yourself at home? · Put a warm, moist cloth or heating pad set on low on your jaw. Do this for 10 to 20 minutes at a time. Put a thin cloth between the heating pad and your skin. · Avoid hard or chewy foods that cause your jaws to work very hard. Examples include popcorn, jerky, tough meats, chewy breads, gum, and raw apples and carrots. · Choose softer foods that are easy to chew. These include eggs, yogurt, and soup. · Cut your food into small pieces. Chew slowly. · If your jaw gets too painful to chew, or if it locks, you may need to puree your food for a few days or weeks. · To relax your jaw, repeat this exercise for a few minutes every morning and evening. Watch yourself in a mirror. Gently open and close your mouth. Move your jaw straight up and down. But don't do this if it makes your pain worse. · Get at least 30 minutes of exercise on most days of the week to relieve stress. Walking is a good choice. You also may want to do other activities, such as running, swimming, cycling, or playing tennis or team sports. · Do not: 
¨ Hold a phone between your shoulder and your jaw. ¨ Open your mouth all the way, like when you sing loudly or yawn. ¨ Clench or grind your teeth, bite your lips, or chew your fingernails. ¨ Clench things such as pens, pipes, or cigars between your teeth. When should you call for help? Call your doctor now or seek immediate medical care if: 
? · Your jaw is locked open or shut or it is hard to move your jaw. ? Watch closely for changes in your health, and be sure to contact your doctor if: 
? · Your jaw pain gets worse. ? · Your face is swollen. ? · You do not get better as expected. Where can you learn more? Go to http://francis-elise.info/. Enter B484 in the search box to learn more about \"Temporomandibular Disorder: Care Instructions. \" Current as of: May 12, 2017 Content Version: 11.4 © 5563-8689 Healthwise, Incorporated. Care instructions adapted under license by [employer] (which disclaims liability or warranty for this information). If you have questions about a medical condition or this instruction, always ask your healthcare professional. [address] any warranty or liability for your use of this information. Introducing [facility]! [employer] introduces [employer] patient portal. Now you can access parts of your medical record, email your doctor's office, and request medication refills online. 1. In your internet browser, go to [email or web address] 2. Click on the First Time User? Click Here link in the Sign In box. You will see the New Member Sign Up page. 3. Enter your [employer] Access Code exactly as it appears below. You will not need to use this code after youve completed the sign-up process. If you do not sign up before the expiration date, you must request a new code. · [employer] Access Code: [email or web address] Expires: 4/8/2018  3:40 PM 
 
4. Enter the last four digits of your Social Security Number (xxxx) and Date of Birth (mm/dd/yyyy) as indicated and click Submit. You will be taken to the next sign-up page. 5. Create a [employer] ID. This will be your [employer] login ID and cannot be changed, so think of one that is secure and easy to remember. 6. Create a [employer] password. You can change your password at any time. 7. Enter your Password Reset Question and Answer. This can be used at a later time if you forget your password. 8. Enter your e-mail address. You will receive e-mail notification when new information is available in [address] 9. Click Sign Up. You can now view and download portions of your medical record. 10. Click the Download Summary menu link to download a portable copy of your medical information.  
 
If you have questions, please visit the Frequently Asked Questions section of the ididwork. Remember, [employer] is NOT to be used for urgent needs. For medical emergencies, dial 911. Now available from your iPhone and Android! Please provide this summary of care documentation to your next provider. Your primary care clinician is listed as JERROD Ryan. If you have any questions after today's visit, please call [phone].

## 2019-01-17 ENCOUNTER — OFFICE VISIT (OUTPATIENT)
Dept: INTERNAL MEDICINE CLINIC | Age: 74
End: 2019-01-17

## 2019-01-17 VITALS
HEART RATE: 80 BPM | SYSTOLIC BLOOD PRESSURE: 122 MMHG | OXYGEN SATURATION: 98 % | DIASTOLIC BLOOD PRESSURE: 66 MMHG | WEIGHT: 150 LBS | HEIGHT: 65 IN | BODY MASS INDEX: 24.99 KG/M2

## 2019-01-17 DIAGNOSIS — I10 ESSENTIAL HYPERTENSION: ICD-10-CM

## 2019-01-17 DIAGNOSIS — Z00.00 INITIAL MEDICARE ANNUAL WELLNESS VISIT: Primary | ICD-10-CM

## 2019-01-17 DIAGNOSIS — N39.0 URINARY TRACT INFECTION WITHOUT HEMATURIA, SITE UNSPECIFIED: ICD-10-CM

## 2019-01-17 DIAGNOSIS — M48.062 SPINAL STENOSIS OF LUMBAR REGION WITH NEUROGENIC CLAUDICATION: ICD-10-CM

## 2019-01-17 DIAGNOSIS — Z12.11 COLON CANCER SCREENING: ICD-10-CM

## 2019-01-17 DIAGNOSIS — M85.89 OSTEOPENIA OF MULTIPLE SITES: ICD-10-CM

## 2019-01-17 DIAGNOSIS — K21.9 GASTROESOPHAGEAL REFLUX DISEASE WITHOUT ESOPHAGITIS: ICD-10-CM

## 2019-01-17 DIAGNOSIS — E55.9 VITAMIN D DEFICIENCY: ICD-10-CM

## 2019-01-17 PROBLEM — C50.919 BREAST CANCER (HCC): Status: RESOLVED | Noted: 2017-08-21 | Resolved: 2019-01-17

## 2019-01-17 LAB
BACTERIA UA POCT, BACTPOCT: NORMAL
BILIRUB UR QL STRIP: NEGATIVE
CASTS UA POCT: NORMAL
CLUE CELLS, CLUEPOCT: NEGATIVE
CRYSTALS UA POCT, CRYSPOCT: NEGATIVE
EPITHELIAL CELLS POCT: NORMAL
GLUCOSE UR-MCNC: NEGATIVE MG/DL
GRAN# POC: 5.1 K/UL (ref 2–7.8)
GRAN% POC: 69.6 % (ref 37–92)
HCT VFR BLD CALC: 41.5 % (ref 37–51)
HGB BLD-MCNC: 14.1 G/DL (ref 12–18)
KETONES P FAST UR STRIP-MCNC: NEGATIVE MG/DL
LY# POC: 1.8 K/UL (ref 0.6–4.1)
LY% POC: 26.3 % (ref 10–58.5)
MCH RBC QN: 32.2 PG (ref 26–32)
MCHC RBC-ENTMCNC: 34 G/DL (ref 30–36)
MCV RBC: 95 FL (ref 80–97)
MID #, POC: 0.2 K/UL (ref 0–1.8)
MID% POC: 4.1 % (ref 0.1–24)
MUCUS UA POCT, MUCPOCT: NORMAL
PH UR STRIP: 6 [PH] (ref 5–7)
PLATELET # BLD: 295 K/UL (ref 140–440)
PROT UR QL STRIP: NEGATIVE
RBC # BLD: 4.39 M/UL (ref 4.2–6.3)
RBC UA POCT, RBCPOCT: NORMAL
SP GR UR STRIP: 1.02 (ref 1.01–1.02)
TRICH UA POCT, TRICHPOC: NEGATIVE
UA UROBILINOGEN AMB POC: NORMAL (ref 0.2–1)
URINALYSIS CLARITY POC: CLEAR
URINALYSIS COLOR POC: YELLOW
URINE BLOOD POC: NEGATIVE
URINE CULT COMMENT, POCT: NORMAL
URINE LEUKOCYTES POC: NORMAL
URINE NITRITES POC: NEGATIVE
WBC # BLD: 7.1 K/UL (ref 4.1–10.9)
WBC UA POCT, WBCPOCT: 0
YEAST UA POCT, YEASTPOC: NEGATIVE

## 2019-01-17 RX ORDER — RANITIDINE 150 MG/1
150 TABLET, FILM COATED ORAL DAILY
Qty: 90 TAB | Refills: 3 | Status: SHIPPED | OUTPATIENT
Start: 2019-01-17 | End: 2020-02-12 | Stop reason: ALTCHOICE

## 2019-01-17 RX ORDER — HYDROCHLOROTHIAZIDE 25 MG/1
25 TABLET ORAL DAILY
Qty: 90 TAB | Refills: 3 | Status: SHIPPED | OUTPATIENT
Start: 2019-01-17 | End: 2020-02-12 | Stop reason: SDUPTHER

## 2019-01-17 RX ORDER — AMLODIPINE BESYLATE 5 MG/1
5 TABLET ORAL DAILY
Qty: 90 TAB | Refills: 3 | Status: SHIPPED | OUTPATIENT
Start: 2019-01-17 | End: 2020-02-12 | Stop reason: SDUPTHER

## 2019-01-17 RX ORDER — BENAZEPRIL HYDROCHLORIDE 20 MG/1
20 TABLET ORAL DAILY
Qty: 90 TAB | Refills: 3 | Status: SHIPPED | OUTPATIENT
Start: 2019-01-17 | End: 2020-02-12 | Stop reason: SDUPTHER

## 2019-01-17 RX ORDER — RANITIDINE 150 MG/1
150 TABLET, FILM COATED ORAL DAILY
COMMUNITY
End: 2019-01-17 | Stop reason: SDUPTHER

## 2019-01-17 NOTE — PROGRESS NOTES
This note will not be viewable in [address] Jase Morris is a 75 y.o. female who presents for an Initial Medicare Annual Wellness Exam (AWV) and follow up of chronic medical conditions. She has not had a Medicare wellness exam done within the last year    I have reviewed the patient's medical history in detail and updated the computerized patient record. History     Subjective:  Mrs. Katharine Mejia is a 21-year-old  female who presents to the office today for Medicare wellness check and follow-up of multiple medical problems. The patient has hypertension and remains on hydrochlorothiazide and benazepril and amlodipine. She tolerates this regimen without dizziness, muscle cramping, cough, rash, lower extremity edema. She has had no headaches, numbness, tingling or focal neurological problems. She is on ranitidine for significant acid reflux. She notes that if she does not take the medication that she will have breakthrough symptoms she has had no recent dysphagia, early satiety or unexplained weight loss. She has osteopenia and along with vitamin D deficiency she remains on supplementation for this. She notes no falls or fractures in the last year and has not had a bone density test in 3 years. She has degenerative arthritis and has been on anti-inflammatory medication but generally has not been taking this recently. She does have lumbar spinal stenosis and this does flareup from time to time and recently has been worse that she has been moving and carrying a lot of boxes.     Patient Active Problem List   Diagnosis Code    Allergic rhinitis J30.9    Internal hemorrhoid K64.8    Diverticulosis of large intestine without hemorrhage K57.30    Vitamin D deficiency E55.9    Osteopenia M85.80    GERD (gastroesophageal reflux disease) K21.9    Hypertension I10    DJD (degenerative joint disease) M19.90    Swelling of thumb, right M79.89    Lumbar spinal stenosis M48.061    TMJ dysfunction M26.609       Patient Care Team:  Jerome Hawkins MD as PCP - General (Internal Medicine)    Past Medical History:   Diagnosis Date    Allergic rhinitis 8/21/2017    Breast cancer ([address] 2009    Right breast     Diverticulosis of large intestine without hemorrhage 8/21/2017    DJD (degenerative joint disease) 8/21/2017    GERD (gastroesophageal reflux disease) 8/21/2017    Hypertension 8/21/2017    Internal hemorrhoid 8/21/2017    Lumbar spinal stenosis 9/20/2017    Osteopenia 8/21/2017    S/P radiation therapy 2009    Swelling of thumb, right 8/21/2017    TMJ dysfunction 4/5/2018    Vitamin D deficiency 8/21/2017     Past Surgical History:   Procedure Laterality Date    HX BREAST BIOPSY Right 2008    positive ultrasound core bx    HX BREAST BIOPSY Bilateral 2008    benign mri bx    HX BREAST LUMPECTOMY Right 2009    x2    HX COLONOSCOPY      HX DILATION AND CURETTAGE      HX TONSILLECTOMY       Allergies   Allergen Reactions    Sulfa (Sulfonamide Antibiotics) Unknown (comments)     Current Outpatient Medications   Medication Sig Dispense Refill    raNITIdine (ZANTAC) 150 mg tablet Take 1 Tab by mouth daily. 90 Tab 3    hydroCHLOROthiazide (HYDRODIURIL) 25 mg tablet Take 1 Tab by mouth daily. 90 Tab 3    benazepril (LOTENSIN) 20 mg tablet Take 1 Tab by mouth daily. 90 Tab 3    amLODIPine (NORVASC) 5 mg tablet Take 1 Tab by mouth daily. 90 Tab 3    MULTIVITAMIN (MULTIPLE VITAMIN PO) Take  by mouth.        Social History     Socioeconomic History    Marital status:      Spouse name: Not on file    Number of children: 3    Years of education: Not on file    Highest education level: Not on file   Tobacco Use    Smoking status: Never Smoker    Smokeless tobacco: Never Used   Substance and Sexual Activity    Alcohol use: No     Family History   Problem Relation Age of Onset    Breast Cancer Sister 61    COPD Mother     Stroke Father      Health Maintenance   Topic Date Due    DTaP/Tdap/Td series (1 - Tdap) 08/21/1965    Shingrix Vaccine Age 50> (1 of 2) 08/21/1994    GLAUCOMA SCREENING Q2Y  08/21/2009    COLONOSCOPY  12/18/2017    MEDICARE YEARLY EXAM  03/14/2018    BREAST CANCER SCRN MAMMOGRAM  02/15/2020    Bone Densitometry (Dexa) Screening  Completed    Pneumococcal 65+ Low/Medium Risk  Completed    Influenza Age 5 to Adult  Completed          Review of Systems  Constitutional: negative for fevers, chills, anorexia and weight loss  Eyes:   negative for visual disturbance and irritation  ENT:   negative for tinnitus,sore throat,nasal congestion,ear pain,hoarseness  Respiratory:  negative for cough, hemoptysis, dyspnea,wheezing  CV:   negative for chest pain, palpitations, lower extremity edema  GI:   negative for nausea, vomiting, diarrhea, abdominal pain,melena  Endo:               negative for polyuria,polydipsia,polyphagia,heat intolerance  Genitourinary: negative for frequency, dysuria and hematuria  Integumentary: negative for rash and pruritus  Hematologic:  negative for easy bruising and gum/nose bleeding  Musculoskel: negative for myalgias, arthralgias, back pain, muscle weakness, joint pain  Neurological:  negative for headaches, dizziness, vertigo, memory problems and gait   Behavl/Psych: negative for feelings of anxiety, depression, mood changes  ROS otherwise negative      Depression Risk Factor Screening:     PHQ over the last two weeks 1/17/2019   Little interest or pleasure in doing things Not at all   Feeling down, depressed, irritable, or hopeless Not at all   Total Score PHQ 2 0       Alcohol Risk Factor Screening: You do not drink alcohol or very rarely. Functional Ability and Level of Safety:   Hearing Loss  Hearing is good.     Activities of Daily Living  ADL Assessment 1/17/2019   Feeding yourself No Help Needed   Getting from bed to chair No Help Needed   Getting dressed No Help Needed   Bathing or showering No Help Needed   Walk across the room (includes cane/walker) No Help Needed   Using the telphone No Help Needed   Taking your medications No Help Needed   Preparing meals No Help Needed   Managing money (expenses/bills) No Help Needed   Moderately strenuous housework (laundry) No Help Needed   Shopping for personal items (toiletries/medicines) No Help Needed   Shopping for groceries No Help Needed   Driving No Help Needed   Climbing a flight of stairs No Help Needed   Getting to places beyond walking distances No Help Needed       Fall Risk  Fall Risk Assessment, last 12 mths 1/17/2019   Able to walk? Yes   Fall in past 12 months? No       Abuse Screen  Abuse Screening Questionnaire 1/17/2019   Do you ever feel afraid of your partner? N   Are you in a relationship with someone who physically or mentally threatens you? N   Is it safe for you to go home? Y       Cognitive Screening   Evaluation of Cognitive Function:  Has your family/caregiver stated any concerns about your memory: no    Physical Exam     Visit Vitals  /66 (BP 1 Location: Left arm, BP Patient Position: Sitting)   Pulse 80   Ht 5' 4.5\" (1.638 m)   Wt 150 lb (68 kg)   LMP  (LMP Unknown)   SpO2 98%   BMI 25.35 kg/m²     Body mass index is 25.35 kg/m². General appearance - alert, well appearing, and in no distress  Mental status - alert, oriented to person, place, and time  EYE-LOS, EOMI,conjunctiva normal bilaterally, lids normal  ENT-ENT exam normal, no neck nodes or sinus tenderness  Nose - normal and patent, no erythema,  Or discharge   Mouth - mucous membranes moist, pharynx normal without lesions  Neck - supple, no significant adenopathy or bruit  Chest - clear to auscultation, no wheezes, rales or rhonchi.   Heart - normal rate, regular rhythm, normal S1, S2, no murmurs, rubs, clicks or gallops   Abdomen - soft, nontender, nondistended, no masses or organomegaly  Lymph- no adenopathy palpable  Ext-peripheral pulses normal, no pedal edema, no clubbing or cyanosis  Skin-Warm and dry. no hyperpigmentation, vitiligo, or suspicious lesions  Neuro -alert, oriented, normal speech, no focal findings or movement disorder noted    Assessment/Plan   [place] education and counseling provided:  Age appropriate evidence-based preventive care recommendations based on today's review and evaluation; including relevant cancer screening guidelines, and vaccination recommendations. An After Visit Summary was printed and given to the patient which information about these guidelines, and a personalized schedule for health maintenance items. Whe appropriate and with patient agreement, orders noted below were placed to complete missing health maintenance items. Additional Plan for follow up chronic medical conditions includes:  Diagnoses and all orders for this visit:    Initial Medicare annual wellness visit    Essential hypertension  -     AMB POC COMPLETE CBC,AUTOMATED ENTER  -     COLLECTION VENOUS BLOOD,VENIPUNCTURE  -     AMB POC URINALYSIS DIP STICK AUTO W/ MICRO   -     LIPID PANEL  -     METABOLIC PANEL, COMPREHENSIVE  -     hydroCHLOROthiazide (HYDRODIURIL) 25 mg tablet; Take 1 Tab by mouth daily. , Normal, Disp-90 Tab, R-3  -     benazepril (LOTENSIN) 20 mg tablet; Take 1 Tab by mouth daily. , Normal, Disp-90 Tab, R-3  -     amLODIPine (NORVASC) 5 mg tablet; Take 1 Tab by mouth daily. , Normal, Disp-90 Tab, R-3    Osteopenia of multiple sites  -     DEXA BONE DENSITY STUDY AXIAL; Future    Vitamin D deficiency  -     VITAMIN D, 25 HYDROXY    Gastroesophageal reflux disease without esophagitis    Spinal stenosis of lumbar region with neurogenic claudication    Colon cancer screening  -     COLOGUARD TEST (FECAL DNA COLORECTAL CANCER SCREENING)    Urinary tract infection without hematuria, site unspecified  -     hydroCHLOROthiazide (HYDRODIURIL) 25 mg tablet; Take 1 Tab by mouth daily. , Normal, Disp-90 Tab, R-3  -     benazepril (LOTENSIN) 20 mg tablet;  Take 1 Tab by mouth daily., Normal, Disp-90 Tab, R-3  -     amLODIPine (NORVASC) 5 mg tablet; Take 1 Tab by mouth daily. , Normal, Disp-90 Tab, R-3    Other orders  -     raNITIdine (ZANTAC) 150 mg tablet; Take 1 Tab by mouth daily. , Normal, Disp-90 Tab, R-3          Other instructions: The patient's medications are reviewed and reconciled. No change in her current medical regimen is made. Body mass index is 25.35 and dietary counseling along with printed patient education is given    Advanced care planning discussion occurred today    Health maintenance issues were reviewed she has had a glaucoma screening and will have a copy of this forwarded to us. She is now due for colonoscopy and wishes to have a Cologard test done as she is at low risk having had nobody in her family with colon cancer and no polyps in her family. Patient is due for follow-up bone density and this will be scheduled. Age-appropriate vaccinations were reviewed and Tdap and shingles vaccine were recommended. Await results of multiple labs    Follow-up in 6 months    Follow-up Disposition:  Return in about 6 months (around 7/17/2019). I have reviewed with the patient details of the assessment and plan and all questions were answered. Relevent patient education was performed. The most recent lab findings were reviewed with the patient.     Webster Litten, MD

## 2019-01-17 NOTE — PROGRESS NOTES
Chief Complaint   Patient presents with    Complete Physical       Depression Risk Factor Screening:     PHQ over the last two weeks 1/17/2019   Little interest or pleasure in doing things Not at all   Feeling down, depressed, irritable, or hopeless Not at all   Total Score PHQ 2 0       Functional Ability and Level of Safety:     Activities of Daily Living  ADL Assessment 1/17/2019   Feeding yourself No Help Needed   Getting from bed to chair No Help Needed   Getting dressed No Help Needed   Bathing or showering No Help Needed   Walk across the room (includes cane/walker) No Help Needed   Using the telphone No Help Needed   Taking your medications No Help Needed   Preparing meals No Help Needed   Managing money (expenses/bills) No Help Needed   Moderately strenuous housework (laundry) No Help Needed   Shopping for personal items (toiletries/medicines) No Help Needed   Shopping for groceries No Help Needed   Driving No Help Needed   Climbing a flight of stairs No Help Needed   Getting to places beyond walking distances No Help Needed       Fall Risk  Fall Risk Assessment, last 12 mths 1/17/2019   Able to walk? Yes   Fall in past 12 months? No       Abuse Screen  Abuse Screening Questionnaire 1/17/2019   Do you ever feel afraid of your partner? N   Are you in a relationship with someone who physically or mentally threatens you? N   Is it safe for you to go home?  Y         Patient Care Team   Patient Care Team:  Joe Servin MD as PCP - General (Internal Medicine)

## 2019-01-17 NOTE — PATIENT INSTRUCTIONS
The best way to stay healthy is to live a healthy lifestyle. A healthy lifestyle includes regular exercise, eating a well-balanced diet, keeping a healthy weight and not smoking. Regular physical exams and screening tests are another important way to take care of yourself. Preventive exams provided by health care providers can find health problems early when treatment works best and can keep you from getting certain diseases or illnesses. Preventive services include exams, lab tests, screenings, shots, monitoring and information to help you take care of your own health. All people over 65 should have a pneumonia shot. Pneumonia shots are usually only needed once in a lifetime unless your doctor decides differently. In addition to your physical exam, some screening tests are recommended:    All people over 65 should have a yearly flu shot. People over 65 are at medium to high risk for Hepatitis B. Three shots are needed for complete protection. Bone mass measurement (dexa scan) is recommended every two years. Diabetes Mellitus screening is recommended every year. Glaucoma is an eye disease caused by high pressure in the eye. An eye exam is recommended every year. Cardiovascular screening tests that check your cholesterol and other blood fat (lipid) levels are recommended every five years. Colorectal Cancer screening tests help to find pre-cancerous polyps (growths in the colon) so they can be removed before they turn into cancer. Tests ordered for screening depend on your personal and family history risk factors. Prostate Cancer Screening (annually up to age 76)    Screening for breast cancer is recommended yearly with a Mammogram.    Screening for cervical and vaginal cancer is recommended with a pelvic and Pap test every two years.  However if you have had an abnormal pap in the past  three years or at high risk for cervical or vaginal cancer Medicare will cover a pap test and a pelvic exam every year. Here is a list of your current Health Maintenance items with a due date:  Health Maintenance Due   Topic Date Due    DTaP/Tdap/Td  (1 - Tdap) 08/21/1965    Shingles Vaccine (1 of 2) 08/21/1994    Glaucoma Screening   08/21/2009    Colonoscopy  12/18/2017    Annual Well Visit  03/14/2018          Learning About Cutting Calories  How do calories affect your weight? Food gives your body energy. Energy from the food you eat is measured in calories. This energy keeps your heart beating, your brain active, and your muscles working. Your body needs a certain number of calories each day. After your body uses the calories it needs, it stores extra calories as fat. To lose weight safely, you have to eat fewer calories while eating in a healthy way. How many calories do you need each day? The more active you are, the more calories you need. When you are less active, you need fewer calories. How many calories you need each day also depends on several things, including your age and whether you are male or female. Here are some general guidelines for adults:  · Less active women and older adults need 1,600 to 2,000 calories each day. · Active women and less active men need 2,000 to 2,400 calories each day. · Active men need 2,400 to 3,000 calories each day. How can you cut calories and eat healthy meals? Whole grains, vegetables and fruits, and dried beans are good lower-calorie foods. They give you lots of nutrients and fiber. And they fill you up. Sweets, energy drinks, and soda pop are high in calories. They give you few nutrients and no fiber. Try to limit soda pop, fruit juice, and energy drinks. Drink water instead. Some fats can be part of a healthy diet. But cutting back on fats from highly processed foods like fast foods and many snack foods is a good way to lower the calories in your diet.  Also, use smaller amounts of fats like butter, margarine, salad dressing, and mayonnaise. Add fresh garlic, lemon, or herbs to your meals to add flavor without adding fat. Meats and dairy products can be a big source of hidden fats. Try to choose lean or low-fat versions of these products. Fat-free cookies, candies, chips, and frozen treats can still be high in sugar and calories. Some fat-free foods have more calories than regular ones. Eat fat-free treats in moderation, as you would other foods. If your favorite foods are high in fat, salt, sugar, or calories, limit how often you eat them. Eat smaller servings, or look for healthy substitutes. Fill up on fruits, vegetables, and whole grains. Eating at home  · Use meat as a side dish instead of as the main part of your meal.  · Try main dishes that use whole wheat pasta, brown rice, dried beans, or vegetables. · Find ways to cook with little or no fat, such as broiling, steaming, or grilling. · Use cooking spray instead of oil. If you use oil, use a monounsaturated oil, such as canola or olive oil. · Trim fat from meats before you cook them. · Drain off fat after you brown the meat or while you roast it. · Chill soups and stews after you cook them. Then skim the fat off the top after it hardens. Eating out  · Order foods that are broiled or poached rather than fried or breaded. · Cut back on the amount of butter or margarine that you use on bread. · Order sauces, gravies, and salad dressings on the side, and use only a little. · When you order pasta, choose tomato sauce rather than cream sauce. · Ask for salsa with your baked potato instead of sour cream, butter, cheese, or brock. · Order meals in a small size instead of upgrading to a large. · Share an entree, or take part of your food home to eat as another meal.  · Share appetizers and desserts. Where can you learn more? Go to http://francis-elise.info/. Enter 99 [id number] in the search box to learn more about \"Learning About Cutting Calories. \"  Current as of: March 29, 2018  Content Version: 11.8  © 9920-1745 [employer]. Care instructions adapted under license by [email or web address] (which disclaims liability or warranty for this information). If you have questions about a medical condition or this instruction, always ask your healthcare professional. [address] any warranty or liability for your use of this information. Advance Directives: Care Instructions  Your Care Instructions  An advance directive is a legal way to state your wishes at the end of your life. It tells your family and your doctor what to do if you can no longer say what you want. There are two main types of advance directives. You can change them any time that your wishes change. · A living will tells your family and your doctor your wishes about life support and other treatment. · A durable power of  for health care lets you name a person to make treatment decisions for you when you can't speak for yourself. This person is called a health care agent. If you do not have an advance directive, decisions about your medical care may be made by a doctor or a  who doesn't know you. It may help to think of an advance directive as a gift to the people who care for you. If you have one, they won't have to make tough decisions by themselves. Follow-up care is a key part of your treatment and safety. Be sure to make and go to all appointments, and call your doctor if you are having problems. It's also a good idea to know your test results and keep a list of the medicines you take. How can you care for yourself at home? · Discuss your wishes with your loved ones and your doctor. This way, there are no surprises. · Many states have a unique form. Or you might use a universal form that has been approved by many states. This kind of form can sometimes be completed and stored online.  Your electronic copy will then be available wherever you have a connection to the Internet. In most cases, doctors will respect your wishes even if you have a form from a different state. · You don't need a  to do an advance directive. But you may want to get legal advice. · Think about these questions when you prepare an advance directive:  ? Who do you want to make decisions about your medical care if you are not able to? Many people choose a family member or close friend. ? Do you know enough about life support methods that might be used? If not, talk to your doctor so you understand. ? What are you most afraid of that might happen? You might be afraid of having pain, losing your independence, or being kept alive by machines. ? Where would you prefer to die? Choices include your home, a hospital, or a nursing home. ? Would you like to have information about hospice care to support you and your family? ? Do you want to donate organs when you die? ? Do you want certain Sikhism practices performed before you die? If so, put your wishes in the advance directive. · Read your advance directive every year, and make changes as needed. When should you call for help? Be sure to contact your doctor if you have any questions. Where can you learn more? Go to http://francis-elise.info/. Enter R264 in the search box to learn more about \"Advance Directives: Care Instructions. \"  Current as of: April 19, 2018  Content Version: 11.8  © 1768-8359 [employer]. Care instructions adapted under license by [employer] (which disclaims liability or warranty for this information). If you have questions about a medical condition or this instruction, always ask your healthcare professional. [address] any warranty or liability for your use of this information.

## 2019-01-18 LAB
25(OH)D3+25(OH)D2 SERPL-MCNC: 35.5 NG/ML (ref 30–100)
ALBUMIN SERPL-MCNC: 4.4 G/DL (ref 3.5–4.8)
ALBUMIN/GLOB SERPL: 1.5 {RATIO} (ref 1.2–2.2)
ALP SERPL-CCNC: 85 IU/L (ref 39–117)
ALT SERPL-CCNC: 62 IU/L (ref 0–32)
AST SERPL-CCNC: 41 IU/L (ref 0–40)
BILIRUB SERPL-MCNC: 0.6 MG/DL (ref 0–1.2)
BUN SERPL-MCNC: 18 MG/DL (ref 8–27)
BUN/CREAT SERPL: 25 (ref 12–28)
CALCIUM SERPL-MCNC: 9.9 MG/DL (ref 8.7–10.3)
CHLORIDE SERPL-SCNC: 99 MMOL/L (ref 96–106)
CHOLEST SERPL-MCNC: 194 MG/DL (ref 100–199)
CO2 SERPL-SCNC: 24 MMOL/L (ref 20–29)
CREAT SERPL-MCNC: 0.73 MG/DL (ref 0.57–1)
GLOBULIN SER CALC-MCNC: 2.9 G/DL (ref 1.5–4.5)
GLUCOSE SERPL-MCNC: 85 MG/DL (ref 65–99)
HDLC SERPL-MCNC: 65 MG/DL
LDLC SERPL CALC-MCNC: 115 MG/DL (ref 0–99)
POTASSIUM SERPL-SCNC: 3.6 MMOL/L (ref 3.5–5.2)
PROT SERPL-MCNC: 7.3 G/DL (ref 6–8.5)
SODIUM SERPL-SCNC: 140 MMOL/L (ref 134–144)
TRIGL SERPL-MCNC: 70 MG/DL (ref 0–149)
VLDLC SERPL CALC-MCNC: 14 MG/DL (ref 5–40)

## 2019-01-29 ENCOUNTER — TELEPHONE (OUTPATIENT)
Dept: INTERNAL MEDICINE CLINIC | Age: 74
End: 2019-01-29

## 2019-01-29 NOTE — TELEPHONE ENCOUNTER
----- Message from Anthony Sotomayor MD sent at 1/29/2019  2:37 PM EST -----  Bone density test shows evidence of osteoporosis in her wrist.  Would recommend start of Prolia injections to try to reverse this.   We will also need to take daily calcium and vitamin D.

## 2019-02-06 ENCOUNTER — LAB ONLY (OUTPATIENT)
Dept: INTERNAL MEDICINE CLINIC | Age: 74
End: 2019-02-06

## 2019-02-06 ENCOUNTER — HOSPITAL ENCOUNTER (OUTPATIENT)
Dept: INFUSION THERAPY | Age: 74
Discharge: HOME | End: 2019-02-06
Payer: MEDICARE

## 2019-02-06 VITALS
HEART RATE: 78 BPM | RESPIRATION RATE: 18 BRPM | TEMPERATURE: 98.2 F | SYSTOLIC BLOOD PRESSURE: 137 MMHG | OXYGEN SATURATION: 96 % | DIASTOLIC BLOOD PRESSURE: 73 MMHG

## 2019-02-06 DIAGNOSIS — R94.5 ABNORMAL LIVER FUNCTION: Primary | ICD-10-CM

## 2019-02-06 PROCEDURE — 96372 THER/PROPH/DIAG INJ SC/IM: CPT

## 2019-02-06 PROCEDURE — 74011250636 HC RX REV CODE- 250/636: Performed by: INTERNAL MEDICINE

## 2019-02-06 RX ADMIN — DENOSUMAB 60 MG: 60 INJECTION SUBCUTANEOUS at 14:15

## 2019-02-06 NOTE — PROGRESS NOTES
[facility] Visit Note: 
Arrived - 0 Labs obtained 1/17/19, Ca 9.9 Visit Vitals /73 (BP 1 Location: Right arm, BP Patient Position: Sitting) Pulse 78 Temp 98.2 °F (36.8 °C) Resp 18 LMP  (LMP Unknown) SpO2 96% Assessment - unchanged. Reviewed Prolia info. Pt denies any recent or upcoming dental work. Prolia 60mg SQ slowly in L arm. 1415 - Tolerated well. No reaction noted. Reviewed Prolia D/C instructions. Verbalized understanding. Pt denies any acute problems/changes. Discharged from [facility] ambulatory. No distress. Next appt: 8/7/19 at 1400

## 2019-02-07 LAB
ALBUMIN SERPL-MCNC: 4.4 G/DL (ref 3.5–4.8)
ALP SERPL-CCNC: 78 IU/L (ref 39–117)
ALT SERPL-CCNC: 30 IU/L (ref 0–32)
AST SERPL-CCNC: 32 IU/L (ref 0–40)
BILIRUB DIRECT SERPL-MCNC: 0.17 MG/DL (ref 0–0.4)
BILIRUB SERPL-MCNC: 0.6 MG/DL (ref 0–1.2)
COLOGUARD TEST, EXTERNAL: NEGATIVE
PROT SERPL-MCNC: 6.9 G/DL (ref 6–8.5)

## 2019-02-08 ENCOUNTER — TELEPHONE (OUTPATIENT)
Dept: INTERNAL MEDICINE CLINIC | Age: 74
End: 2019-02-08

## 2019-02-18 ENCOUNTER — HOSPITAL ENCOUNTER (OUTPATIENT)
Dept: MAMMOGRAPHY | Age: 74
Discharge: HOME | End: 2019-02-18
Attending: INTERNAL MEDICINE
Payer: MEDICARE

## 2019-02-18 DIAGNOSIS — Z12.39 SCREENING BREAST EXAMINATION: ICD-10-CM

## 2019-02-18 PROCEDURE — 77067 SCR MAMMO BI INCL CAD: CPT

## 2019-08-07 ENCOUNTER — APPOINTMENT (OUTPATIENT)
Dept: INFUSION THERAPY | Age: 74
End: 2019-08-07

## 2019-08-14 ENCOUNTER — OFFICE VISIT (OUTPATIENT)
Dept: INTERNAL MEDICINE CLINIC | Age: 74
End: 2019-08-14

## 2019-08-14 VITALS
TEMPERATURE: 98 F | SYSTOLIC BLOOD PRESSURE: 126 MMHG | WEIGHT: 146.4 LBS | BODY MASS INDEX: 24.39 KG/M2 | OXYGEN SATURATION: 97 % | DIASTOLIC BLOOD PRESSURE: 76 MMHG | HEART RATE: 81 BPM | RESPIRATION RATE: 16 BRPM | HEIGHT: 65 IN

## 2019-08-14 DIAGNOSIS — E55.9 VITAMIN D DEFICIENCY: ICD-10-CM

## 2019-08-14 DIAGNOSIS — M48.062 SPINAL STENOSIS OF LUMBAR REGION WITH NEUROGENIC CLAUDICATION: ICD-10-CM

## 2019-08-14 DIAGNOSIS — M81.0 AGE-RELATED OSTEOPOROSIS WITHOUT CURRENT PATHOLOGICAL FRACTURE: ICD-10-CM

## 2019-08-14 DIAGNOSIS — K21.9 GASTROESOPHAGEAL REFLUX DISEASE WITHOUT ESOPHAGITIS: ICD-10-CM

## 2019-08-14 DIAGNOSIS — I10 ESSENTIAL HYPERTENSION: Primary | ICD-10-CM

## 2019-08-14 NOTE — PATIENT INSTRUCTIONS
DASH Diet: Care Instructions  Your Care Instructions    The DASH diet is an eating plan that can help lower your blood pressure. DASH stands for Dietary Approaches to Stop Hypertension. Hypertension is high blood pressure. The DASH diet focuses on eating foods that are high in calcium, potassium, and magnesium. These nutrients can lower blood pressure. The foods that are highest in these nutrients are fruits, vegetables, low-fat dairy products, nuts, seeds, and legumes. But taking calcium, potassium, and magnesium supplements instead of eating foods that are high in those nutrients does not have the same effect. The DASH diet also includes whole grains, fish, and poultry. The DASH diet is one of several lifestyle changes your doctor may recommend to lower your high blood pressure. Your doctor may also want you to decrease the amount of sodium in your diet. Lowering sodium while following the DASH diet can lower blood pressure even further than just the DASH diet alone. Follow-up care is a key part of your treatment and safety. Be sure to make and go to all appointments, and call your doctor if you are having problems. It's also a good idea to know your test results and keep a list of the medicines you take. How can you care for yourself at home? Following the DASH diet  · Eat 4 to 5 servings of fruit each day. A serving is 1 medium-sized piece of fruit, ½ cup chopped or canned fruit, 1/4 cup dried fruit, or 4 ounces (½ cup) of fruit juice. Choose fruit more often than fruit juice. · Eat 4 to 5 servings of vegetables each day. A serving is 1 cup of lettuce or raw leafy vegetables, ½ cup of chopped or cooked vegetables, or 4 ounces (½ cup) of vegetable juice. Choose vegetables more often than vegetable juice. · Get 2 to 3 servings of low-fat and fat-free dairy each day. A serving is 8 ounces of milk, 1 cup of yogurt, or 1 ½ ounces of cheese. · Eat 6 to 8 servings of grains each day.  A serving is 1 slice of bread, 1 ounce of dry cereal, or ½ cup of cooked rice, pasta, or cooked cereal. Try to choose whole-grain products as much as possible. · Limit lean meat, poultry, and fish to 2 servings each day. A serving is 3 ounces, about the size of a deck of cards. · Eat 4 to 5 servings of nuts, seeds, and legumes (cooked dried beans, lentils, and split peas) each week. A serving is 1/3 cup of nuts, 2 tablespoons of seeds, or ½ cup of cooked beans or peas. · Limit fats and oils to 2 to 3 servings each day. A serving is 1 teaspoon of vegetable oil or 2 tablespoons of salad dressing. · Limit sweets and added sugars to 5 servings or less a week. A serving is 1 tablespoon jelly or jam, ½ cup sorbet, or 1 cup of lemonade. · Eat less than 2,300 milligrams (mg) of sodium a day. If you limit your sodium to 1,500 mg a day, you can lower your blood pressure even more. Tips for success  · Start small. Do not try to make dramatic changes to your diet all at once. You might feel that you are missing out on your favorite foods and then be more likely to not follow the plan. Make small changes, and stick with them. Once those changes become habit, add a few more changes. · Try some of the following:  ? Make it a goal to eat a fruit or vegetable at every meal and at snacks. This will make it easy to get the recommended amount of fruits and vegetables each day. ? Try yogurt topped with fruit and nuts for a snack or healthy dessert. ? Add lettuce, tomato, cucumber, and onion to sandwiches. ? Combine a ready-made pizza crust with low-fat mozzarella cheese and lots of vegetable toppings. Try using tomatoes, squash, spinach, broccoli, carrots, cauliflower, and onions. ? Have a variety of cut-up vegetables with a low-fat dip as an appetizer instead of chips and dip. ? Sprinkle sunflower seeds or chopped almonds over salads. Or try adding chopped walnuts or almonds to cooked vegetables.   ? Try some vegetarian meals using beans and peas. Add garbanzo or kidney beans to salads. Make burritos and tacos with mashed tinsley beans or black beans. Where can you learn more? Go to http://francis-elise.info/. Enter L782 in the search box to learn more about \"DASH Diet: Care Instructions. \"  Current as of: July 22, 2018  Content Version: 12.1  © 3918-6687 Healthwise, [employer]. Care instructions adapted under license by [employer] (which disclaims liability or warranty for this information). If you have questions about a medical condition or this instruction, always ask your healthcare professional. [address] any warranty or liability for your use of this information.

## 2019-08-14 NOTE — PROGRESS NOTES
Ta Robbins is a 75 y.o. female presenting for Hypertension (6 mo fu)  . 1. Have you been to the ER, urgent care clinic since your last visit? Hospitalized since your last visit? No    2. Have you seen or consulted any other health care providers outside of the [address] [place] [address] since your last visit? Include any pap smears or colon screening. No    Fall Risk Assessment, last 12 mths 1/17/2019   Able to walk? Yes   Fall in past 12 months? No         Abuse Screening Questionnaire 1/17/2019   Do you ever feel afraid of your partner? N   Are you in a relationship with someone who physically or mentally threatens you? N   Is it safe for you to go home? Y       3 most recent PHQ Screens 1/17/2019   Little interest or pleasure in doing things Not at all   Feeling down, depressed, irritable, or hopeless Not at all   Total Score PHQ 2 0       There are no discontinued medications.

## 2019-08-14 NOTE — PROGRESS NOTES
This note will not be viewable in [address] Bety Flaherty is a 75 y.o. female and presents with Hypertension (6 mo fu)  . Subjective:  Mrs. Josefina Lugo returns to the office today in follow-up of multiple medical problems. The patient has hypertension and remains on amlodipine, benazepril and hydrochlorothiazide. She is tolerating this regimen without orthostatic dizziness, muscle cramping, cough, rash, lower extremity edema. She has had no headaches, numbness, tingling or focal neurological problems. Acid reflux is currently being managed on ranitidine. She has had no breakthrough heartburn and she denies dysphasia, early satiety or unexplained weight loss. She has osteoporosis and currently is taking calcium with vitamin D along with every 6-month Prolia injections. She is tolerating the injections without any difficulties and has had no falls or fractures. She does have chronic low back pain related to spinal stenosis. She denies any radiculopathy and has had no lower extremity weakness. She does take over-the-counter anti-inflammatory medication for this when symptomatic.     Past Medical History:   Diagnosis Date    Allergic rhinitis 8/21/2017    Breast cancer [facility] [address] 2009    Right breast     Diverticulosis of large intestine without hemorrhage 8/21/2017    DJD (degenerative joint disease) 8/21/2017    GERD (gastroesophageal reflux disease) 8/21/2017    Hypertension 8/21/2017    Internal hemorrhoid 8/21/2017    Lumbar spinal stenosis 9/20/2017    Osteopenia 8/21/2017    S/P radiation therapy 2009    Swelling of thumb, right 8/21/2017    TMJ dysfunction 4/5/2018    Vitamin D deficiency 8/21/2017     Past Surgical History:   Procedure Laterality Date    HX BREAST BIOPSY Right 2008    positive ultrasound core bx    HX BREAST BIOPSY Bilateral 2008    benign mri bx    HX BREAST LUMPECTOMY Right 2009    x2    HX COLONOSCOPY      HX DILATION AND CURETTAGE      HX TONSILLECTOMY Allergies   Allergen Reactions    Sulfa (Sulfonamide Antibiotics) Unknown (comments)     Current Outpatient Medications   Medication Sig Dispense Refill    naproxen sod/diphenhydramine (ALEVE PM PO) Take  by mouth nightly as needed.  denosumab (PROLIA) 60 mg/mL injection 60 mg by SubCUTAneous route every 6 months.  raNITIdine (ZANTAC) 150 mg tablet Take 1 Tab by mouth daily. 90 Tab 3    hydroCHLOROthiazide (HYDRODIURIL) 25 mg tablet Take 1 Tab by mouth daily. 90 Tab 3    benazepril (LOTENSIN) 20 mg tablet Take 1 Tab by mouth daily. 90 Tab 3    amLODIPine (NORVASC) 5 mg tablet Take 1 Tab by mouth daily. 90 Tab 3    MULTIVITAMIN (MULTIPLE VITAMIN PO) Take  by mouth.        Social History     Socioeconomic History    Marital status:      Spouse name: Not on file    Number of children: 3    Years of education: Not on file    Highest education level: Not on file   Tobacco Use    Smoking status: Never Smoker    Smokeless tobacco: Never Used   Substance and Sexual Activity    Alcohol use: No     Family History   Problem Relation Age of Onset    Breast Cancer Sister 61    COPD Mother     Stroke Father        Health Maintenance   Topic Date Due    DTaP/Tdap/Td series (1 - Tdap) 08/21/1965    Shingrix Vaccine Age 50> (1 of 2) 08/21/1994    GLAUCOMA SCREENING Q2Y  08/21/2009    Influenza Age 9 to Adult  08/01/2019    MEDICARE YEARLY EXAM  01/18/2020    BREAST CANCER SCRN MAMMOGRAM  02/18/2021    Bone Densitometry (Dexa) Screening  Completed    Pneumococcal 65+ years  Completed        Review of Systems  Constitutional: negative for fevers, chills, anorexia and weight loss  Eyes:   negative for visual disturbance and irritation  ENT:   negative for tinnitus,sore throat,nasal congestion,ear pain,hoarseness  Respiratory:  negative for cough, hemoptysis, dyspnea,wheezing  CV:   negative for chest pain, palpitations, lower extremity edema  GI:   negative for nausea, vomiting, diarrhea, abdominal pain,melena  Endo:               negative for polyuria,polydipsia,polyphagia,heat intolerance  Genitourinary: negative for frequency, dysuria and hematuria  Integumentary: negative for rash and pruritus  Hematologic:  negative for easy bruising and gum/nose bleeding  Musculoskel: Positive for chronic low back pain  Neurological:  negative for headaches, dizziness, vertigo, memory problems and gait   Behavl/Psych: negative for feelings of anxiety, depression, mood changes  ROS otherwise negative      Objective:  Visit Vitals  /76 (BP 1 Location: Left arm, BP Patient Position: Sitting)   Pulse 81   Temp 98 °F (36.7 °C) (Oral)   Resp 16   Ht 5' 4.5\" (1.638 m)   Wt 146 lb 6.4 oz (66.4 kg)   LMP  (LMP Unknown)   SpO2 97%   BMI 24.74 kg/m²     Body mass index is 24.74 kg/m². Physical Exam:   General appearance - alert, well appearing, and in no distress  Mental status - alert, oriented to person, place, and time  EYE-LOS, EOMI,conjunctiva normal bilaterally, lids normal  ENT-ENT exam normal, no neck nodes or sinus tenderness  Nose - normal and patent, no erythema,  Or discharge   Mouth - mucous membranes moist, pharynx normal without lesions  Neck - supple, no significant adenopathy or bruit  Chest - clear to auscultation, no wheezes, rales or rhonchi. Heart - normal rate, regular rhythm, normal S1, S2, no murmurs, rubs, clicks or gallops   Abdomen - soft, nontender, nondistended, no masses or organomegaly  Lymph- no adenopathy palpable  Ext-peripheral pulses normal, no pedal edema, no clubbing or cyanosis  Skin-Warm and dry.  no hyperpigmentation, vitiligo, or suspicious lesions  Neuro -alert, oriented, normal speech, no focal findings or movement disorder noted      Assessment/Plan:  Diagnoses and all orders for this visit:    Essential hypertension    Gastroesophageal reflux disease without esophagitis    Spinal stenosis of lumbar region with neurogenic claudication    Age-related osteoporosis without current pathological fracture    Vitamin D deficiency        Other instructions: The patient's medications were reviewed and reconciled. No change in her current medical regimen is made. A no added salt, prudent diet is encouraged    Influenza vaccination will be recommended for the fall. Labs from 1/17 were reviewed with the patient today. Follow-up in 6 months    Follow-up and Dispositions    · Return in about 6 months (around 2/14/2020). I have reviewed with the patient details of the assessment and plan and all questions were answered. Relevent patient education was performed. The most recent lab findings were reviewed with the patient. An After Visit Summary was printed and given to the patient.     Vivian Padron MD

## 2020-02-12 ENCOUNTER — OFFICE VISIT (OUTPATIENT)
Dept: INTERNAL MEDICINE CLINIC | Age: 75
End: 2020-02-12

## 2020-02-12 VITALS
RESPIRATION RATE: 16 BRPM | HEART RATE: 76 BPM | OXYGEN SATURATION: 98 % | WEIGHT: 151.2 LBS | DIASTOLIC BLOOD PRESSURE: 74 MMHG | SYSTOLIC BLOOD PRESSURE: 116 MMHG | HEIGHT: 65 IN | TEMPERATURE: 97.8 F | BODY MASS INDEX: 25.19 KG/M2

## 2020-02-12 DIAGNOSIS — M48.062 SPINAL STENOSIS OF LUMBAR REGION WITH NEUROGENIC CLAUDICATION: ICD-10-CM

## 2020-02-12 DIAGNOSIS — M81.0 AGE-RELATED OSTEOPOROSIS WITHOUT CURRENT PATHOLOGICAL FRACTURE: ICD-10-CM

## 2020-02-12 DIAGNOSIS — Z00.00 MEDICARE ANNUAL WELLNESS VISIT, SUBSEQUENT: Primary | ICD-10-CM

## 2020-02-12 DIAGNOSIS — K21.9 GASTROESOPHAGEAL REFLUX DISEASE WITHOUT ESOPHAGITIS: ICD-10-CM

## 2020-02-12 DIAGNOSIS — E55.9 VITAMIN D DEFICIENCY: ICD-10-CM

## 2020-02-12 DIAGNOSIS — I10 ESSENTIAL HYPERTENSION: ICD-10-CM

## 2020-02-12 LAB
A-G RATIO,AGRAT: 1.3 RATIO
ALBUMIN SERPL-MCNC: 4.1 G/DL (ref 3.9–5.4)
ALP SERPL-CCNC: 73 U/L (ref 38–126)
ALT SERPL-CCNC: 61 U/L (ref 0–35)
ANION GAP SERPL CALC-SCNC: 14 MMOL/L
AST SERPL W P-5'-P-CCNC: 53 U/L (ref 14–36)
BILIRUB SERPL-MCNC: 0.5 MG/DL (ref 0.2–1.3)
BILIRUB UR QL: NEGATIVE
BUN SERPL-MCNC: 13 MG/DL (ref 7–17)
BUN/CREATININE RATIO,BUCR: 19 RATIO
CALCIUM SERPL-MCNC: 9.8 MG/DL (ref 8.4–10.2)
CHLORIDE SERPL-SCNC: 100 MMOL/L (ref 98–107)
CHOL/HDL RATIO,CHHD: 3 RATIO (ref 0–4)
CHOLEST SERPL-MCNC: 160 MG/DL (ref 0–200)
CLARITY: CLEAR
CO2 SERPL-SCNC: 28 MMOL/L (ref 22–32)
COLOR UR: NORMAL
CREAT SERPL-MCNC: 0.7 MG/DL (ref 0.7–1.2)
GLOBULIN,GLOB: 3.1
GLUCOSE 24H UR-MRATE: NEGATIVE G/(24.H)
GLUCOSE SERPL-MCNC: 90 MG/DL (ref 65–105)
HDLC SERPL-MCNC: 59 MG/DL (ref 35–130)
HGB UR QL STRIP: NEGATIVE
KETONES UR QL STRIP.AUTO: NEGATIVE
LDL/HDL RATIO,LDHD: 1 RATIO
LDLC SERPL CALC-MCNC: 83 MG/DL (ref 0–130)
LEUKOCYTE ESTERASE: NEGATIVE
NITRITE UR QL STRIP.AUTO: NEGATIVE
PH UR STRIP: 6.5 [PH] (ref 5–7)
POTASSIUM SERPL-SCNC: 4 MMOL/L (ref 3.6–5)
PROT SERPL-MCNC: 7.2 G/DL (ref 6.3–8.2)
PROT UR STRIP-MCNC: NEGATIVE MG/DL
RBC #/AREA URNS HPF: 0 #/HPF
SODIUM SERPL-SCNC: 142 MMOL/L (ref 137–145)
SP GR UR REFRACTOMETRY: 1.01 (ref 1–1.03)
TRIGL SERPL-MCNC: 88 MG/DL (ref 0–200)
UROBILINOGEN UR QL STRIP.AUTO: NEGATIVE
VLDLC SERPL CALC-MCNC: 18 MG/DL
WBC URNS QL MICRO: 0 #/HPF

## 2020-02-12 RX ORDER — BENAZEPRIL HYDROCHLORIDE 20 MG/1
20 TABLET ORAL DAILY
Qty: 90 TAB | Refills: 3 | Status: SHIPPED | OUTPATIENT
Start: 2020-02-12 | End: 2021-02-09 | Stop reason: SDUPTHER

## 2020-02-12 RX ORDER — HYDROCHLOROTHIAZIDE 25 MG/1
25 TABLET ORAL DAILY
Qty: 90 TAB | Refills: 3 | Status: SHIPPED | OUTPATIENT
Start: 2020-02-12 | End: 2021-02-09 | Stop reason: SDUPTHER

## 2020-02-12 RX ORDER — AMLODIPINE BESYLATE 5 MG/1
5 TABLET ORAL DAILY
Qty: 90 TAB | Refills: 3 | Status: SHIPPED | OUTPATIENT
Start: 2020-02-12 | End: 2021-02-09 | Stop reason: SDUPTHER

## 2020-02-12 RX ORDER — OMEPRAZOLE 10 MG/1
10 CAPSULE, DELAYED RELEASE ORAL DAILY
COMMUNITY
End: 2022-10-10

## 2020-02-12 NOTE — PROGRESS NOTES
This note will not be viewable in [address] Munira Vizcaino is a 75 y.o. female and presents with Follow Up Chronic Condition (6 mo fu) and Annual Wellness Visit  . Subjective:  Mrs. Dorn Olszewski returns to the office today for Medicare wellness check and follow-up of multiple medical problems. The patient has hypertension and remains on hydrochlorothiazide, amlodipine and benazepril. She tolerates this regimen well and denies muscle cramping, orthostatic dizziness, lower extremity edema or cough. She denies headaches, numbness, tingling or focal neurological problems. She has acid reflux currently on omeprazole therapy previously she had been on Zantac but she was having GI upset related to the medication. The omeprazole continues to work effectively for her and she has had no breakthrough heartburn and denies dysphagia, early satiety or unexplained weight loss. She has osteoporosis and vitamin D deficiency. She remains on vitamin D supplementation and does take every 6-month Prolia injections. She is tolerating this regimen well and has had no side effects related to its usage. She denies any falls or fractures.     Past Medical History:   Diagnosis Date    Allergic rhinitis 8/21/2017    Breast cancer ([address] 2009    Right breast     Diverticulosis of large intestine without hemorrhage 8/21/2017    DJD (degenerative joint disease) 8/21/2017    GERD (gastroesophageal reflux disease) 8/21/2017    Hypertension 8/21/2017    Internal hemorrhoid 8/21/2017    Lumbar spinal stenosis 9/20/2017    Osteopenia 8/21/2017    S/P radiation therapy 2009    Swelling of thumb, right 8/21/2017    TMJ dysfunction 4/5/2018    Vitamin D deficiency 8/21/2017     Past Surgical History:   Procedure Laterality Date    HX BREAST BIOPSY Right 2008    positive ultrasound core bx    HX BREAST BIOPSY Bilateral 2008    benign mri bx    HX BREAST LUMPECTOMY Right 2009    x2    HX COLONOSCOPY      HX DILATION AND CURETTAGE      HX TONSILLECTOMY       Allergies   Allergen Reactions    Sulfa (Sulfonamide Antibiotics) Unknown (comments)     Current Outpatient Medications   Medication Sig Dispense Refill    omeprazole (PRILOSEC) 10 mg capsule Take 10 mg by mouth daily.  hydroCHLOROthiazide (HYDRODIURIL) 25 mg tablet Take 1 Tab by mouth daily. 90 Tab 3    amLODIPine (NORVASC) 5 mg tablet Take 1 Tab by mouth daily. 90 Tab 3    benazepriL (LOTENSIN) 20 mg tablet Take 1 Tab by mouth daily. 90 Tab 3    naproxen sod/diphenhydramine (ALEVE PM PO) Take  by mouth nightly as needed.  denosumab (PROLIA) 60 mg/mL injection 60 mg by SubCUTAneous route every 6 months.  MULTIVITAMIN (MULTIPLE VITAMIN PO) Take  by mouth.        Social History     Socioeconomic History    Marital status:      Spouse name: Not on file    Number of children: 3    Years of education: Not on file    Highest education level: Not on file   Tobacco Use    Smoking status: Never Smoker    Smokeless tobacco: Never Used   Substance and Sexual Activity    Alcohol use: No     Family History   Problem Relation Age of Onset    Breast Cancer Sister 61    COPD Mother     Stroke Father        Health Maintenance   Topic Date Due    DTaP/Tdap/Td series (1 - Tdap) 08/21/1955    Shingrix Vaccine Age 50> (1 of 2) 08/21/1994    Medicare Yearly Exam  01/18/2020    GLAUCOMA SCREENING Q2Y  10/02/2021    Lipid Screen  01/17/2024    Bone Densitometry (Dexa) Screening  Completed    Influenza Age 5 to Adult  Completed    Pneumococcal 65+ years  Completed        Review of Systems  Constitutional: negative for fevers, chills, anorexia and weight loss  Eyes:   negative for visual disturbance and irritation  ENT:   negative for tinnitus,sore throat,nasal congestion,ear pain,hoarseness  Respiratory:  negative for cough, hemoptysis, dyspnea,wheezing  CV:   negative for chest pain, palpitations, lower extremity edema  GI:   negative for nausea, vomiting, diarrhea, abdominal pain,melena  Endo:               negative for polyuria,polydipsia,polyphagia,heat intolerance  Genitourinary: negative for frequency, dysuria and hematuria  Integumentary: negative for rash and pruritus  Hematologic:  negative for easy bruising and gum/nose bleeding  Musculoskel: negative for myalgias, arthralgias, back pain, muscle weakness, joint pain  Neurological:  negative for headaches, dizziness, vertigo, memory problems and gait   Behavl/Psych: negative for feelings of anxiety, depression, mood changes  ROS otherwise negative      Objective:  Visit Vitals  /74   Pulse 76   Temp 97.8 °F (36.6 °C) (Oral)   Resp 16   Ht 5' 4.5\" (1.638 m)   Wt 151 lb 3.2 oz (68.6 kg)   LMP  (LMP Unknown)   SpO2 98%   BMI 25.55 kg/m²     Body mass index is 25.55 kg/m². Physical Exam:   General appearance - alert, well appearing, and in no distress  Mental status - alert, oriented to person, place, and time  EYE-LOS, EOMI,conjunctiva normal bilaterally, lids normal  ENT-ENT exam normal, no neck nodes or sinus tenderness  Nose - normal and patent, no erythema,  Or discharge   Mouth - mucous membranes moist, pharynx normal without lesions  Neck - supple, no significant adenopathy or bruit  Chest - clear to auscultation, no wheezes, rales or rhonchi. Heart - normal rate, regular rhythm, normal S1, S2, no murmurs, rubs, clicks or gallops   Abdomen - soft, nontender, nondistended, no masses or organomegaly  Lymph- no adenopathy palpable  Ext-peripheral pulses normal, no pedal edema, no clubbing or cyanosis  Skin-Warm and dry. no hyperpigmentation, vitiligo, or suspicious lesions  Neuro -alert, oriented, normal speech, no focal findings or movement disorder noted    In addition this patient is seen for AWV  as detailed below:     This is a Subsequent Medicare Annual Wellness Exam (AWV) (Performed 12 months after IPPE or effective date of Medicare Part B enrollment)    I have reviewed the patient's medical history in detail and updated the computerized patient record. Problem list reviewed with patient and risk factors discussed. PSH, SH, FH, Medications and HM issues also reviewed and discussed. Depression screen, fall risk assessment, functional abilities and ACP also reviewed and discussed as above and below. Depression Risk Factor Screening:     3 most recent PHQ Screens 2/12/2020   Little interest or pleasure in doing things Not at all   Feeling down, depressed, irritable, or hopeless Not at all   Total Score PHQ 2 0     Alcohol Risk Factor Screening:     Alcohol Risk Factor Screening:   Do you average 1 drink per night or more than 7 drinks a week:  No    On any one occasion in the past three months have you have had more than 3 drinks containing alcohol:  No    Functional Ability and Level of Safety:   Hearing Loss  Hearing is good. Activities of Daily Living  The home contains: handrails and grab bars  Patient does total self care    Fall Risk  Fall Risk Assessment, last 12 mths 2/12/2020   Able to walk? Yes   Fall in past 12 months? No       Abuse Screen  Patient is not abused    Cognitive Screening   Evaluation of Cognitive Function:  Has your family/caregiver stated any concerns about your memory: no  Normal    Patient Care Team   Patient Care Team:  Reji Wyatt MD as PCP - General (Internal Medicine)  Reji Wyatt MD as PCP - [facility] Empaneled Provider    Assessment/Plan   Education and counseling provided:  Are appropriate based on today's review and evaluation    Assessment/Plan:   Impressions:      ICD-10-CM ICD-9-CM    1. Medicare annual wellness visit, subsequent Z00.00 V70.0    2.  Essential hypertension I10 401.9 hydroCHLOROthiazide (HYDRODIURIL) 25 mg tablet      amLODIPine (NORVASC) 5 mg tablet      benazepriL (LOTENSIN) 20 mg tablet      CBC WITH AUTOMATED DIFF      COLLECTION VENOUS BLOOD,VENIPUNCTURE      LIPID PANEL      METABOLIC PANEL, COMPREHENSIVE      TSH 3RD GENERATION      URINALYSIS W/MICROSCOPIC   3. Age-related osteoporosis without current pathological fracture M81.0 733.01 TSH 3RD GENERATION   4. Gastroesophageal reflux disease without esophagitis K21.9 530.81    5. Spinal stenosis of lumbar region with neurogenic claudication M48.062 724.03    6. Vitamin D deficiency E55.9 268.9 VITAMIN D, 25 HYDROXY        Plan:  1. Continue present meds  2. Lifestyle modifications including Na restriction, low carb/fat diet, weight reduction and exercise (at least a walking program). Follow-up and Dispositions    · Return in about 6 months (around 8/12/2020). Orders Placed This Encounter    CBC WITH AUTOMATED DIFF    LIPID PANEL ([facility])    METABOLIC PANEL, COMPREHENSIVE ([facility])    TSH 3RD GENERATION ([facility])    VITAMIN D, 25 HYDROXY ([facility])    URINALYSIS W/MICROSCOPIC ([facility])    COLLECTION VENOUS BLOOD,VENIPUNCTURE    hydroCHLOROthiazide (HYDRODIURIL) 25 mg tablet     Sig: Take 1 Tab by mouth daily. Dispense:  90 Tab     Refill:  3    amLODIPine (NORVASC) 5 mg tablet     Sig: Take 1 Tab by mouth daily. Dispense:  90 Tab     Refill:  3    benazepriL (LOTENSIN) 20 mg tablet     Sig: Take 1 Tab by mouth daily. Dispense:  90 Tab     Refill:  3       Mina Epstein MD   Assessment/Plan:  Diagnoses and all orders for this visit:    Medicare annual wellness visit, subsequent    Essential hypertension  -     hydroCHLOROthiazide (HYDRODIURIL) 25 mg tablet; Take 1 Tab by mouth daily. , Normal, Disp-90 Tab, R-3  -     amLODIPine (NORVASC) 5 mg tablet; Take 1 Tab by mouth daily. , Normal, Disp-90 Tab, R-3  -     benazepriL (LOTENSIN) 20 mg tablet; Take 1 Tab by mouth daily. , Normal, Disp-90 Tab, R-3  -     CBC WITH AUTOMATED DIFF  -     COLLECTION VENOUS BLOOD,VENIPUNCTURE  -     LIPID PANEL  -     METABOLIC PANEL, COMPREHENSIVE  -     TSH 3RD GENERATION  -     URINALYSIS W/MICROSCOPIC    Age-related osteoporosis without current pathological fracture  -     TSH 3RD GENERATION    Gastroesophageal reflux disease without esophagitis    Spinal stenosis of lumbar region with neurogenic claudication    Vitamin D deficiency  -     VITAMIN D, 25 HYDROXY        Health Maintenance Due   Topic Date Due    DTaP/Tdap/Td series (1 - Tdap) 08/21/1955    Shingrix Vaccine Age 50> (1 of 2) 08/21/1994    Medicare Yearly Exam  01/18/2020       Other instructions: The patient's medications were reviewed and reconciled. No change in her current medical regimen will be made. Body mass index is 25.6 and dietary counseling along with printed patient education is given    Health maintenance issues were reviewed and are up-to-date. Age-appropriate vaccinations were reviewed and she is in need of a Tdap vaccination and shingles vaccine which she will obtain from her pharmacist.    Await results of multiple labs    Follow-up in 6 months    Follow-up and Dispositions    · Return in about 6 months (around 8/12/2020). I have reviewed with the patient details of the assessment and plan and all questions were answered. Relevent patient education was performed. The most recent lab findings were reviewed with the patient. An After Visit Summary was printed and given to the patient. Jignesh Bell MD    Please note that this dictation was completed with [employer], the computer voice recognition software. Quite often unanticipated grammatical, syntax, homophones, and other interpretive errors are inadvertently transcribed by the computer software. Please disregard these errors. Please excuse any errors that have escaped final proofreading.

## 2020-02-12 NOTE — PATIENT INSTRUCTIONS
The best way to stay healthy is to live a healthy lifestyle. A healthy lifestyle includes regular exercise, eating a well-balanced diet, keeping a healthy weight and not smoking. Regular physical exams and screening tests are another important way to take care of yourself. Preventive exams provided by health care providers can find health problems early when treatment works best and can keep you from getting certain diseases or illnesses. Preventive services include exams, lab tests, screenings, shots, monitoring and information to help you take care of your own health. All people over 65 should have a pneumonia shot. Pneumonia shots are usually only needed once in a lifetime unless your doctor decides differently. In addition to your physical exam, some screening tests are recommended:    All people over 65 should have a yearly flu shot. People over 65 are at medium to high risk for Hepatitis B. Three shots are needed for complete protection. Bone mass measurement (dexa scan) is recommended every two years. Diabetes Mellitus screening is recommended every year. Glaucoma is an eye disease caused by high pressure in the eye. An eye exam is recommended every year. Cardiovascular screening tests that check your cholesterol and other blood fat (lipid) levels are recommended every five years. Colorectal Cancer screening tests help to find pre-cancerous polyps (growths in the colon) so they can be removed before they turn into cancer. Tests ordered for screening depend on your personal and family history risk factors. Prostate Cancer Screening (annually up to age 76)    Screening for breast cancer is recommended yearly with a Mammogram.    Screening for cervical and vaginal cancer is recommended with a pelvic and Pap test every two years.  However if you have had an abnormal pap in the past  three years or at high risk for cervical or vaginal cancer Medicare will cover a pap test and a pelvic exam every year. Here is a list of your current Health Maintenance items with a due date:  Health Maintenance Due   Topic Date Due    DTaP/Tdap/Td  (1 - Tdap) 08/21/1955    Shingles Vaccine (1 of 2) 08/21/1994    Annual Well Visit  01/18/2020          Learning About Cutting Calories  How do calories affect your weight? Food gives your body energy. Energy from the food you eat is measured in calories. This energy keeps your heart beating, your brain active, and your muscles working. Your body needs a certain number of calories each day. After your body uses the calories it needs, it stores extra calories as fat. To lose weight safely, you have to eat fewer calories while eating in a healthy way. How many calories do you need each day? The more active you are, the more calories you need. When you are less active, you need fewer calories. How many calories you need each day also depends on several things, including your age and whether you are male or female. Here are some general guidelines for adults:  · Less active women and older adults need 1,600 to 2,000 calories each day. · Active women and less active men need 2,000 to 2,400 calories each day. · Active men need 2,400 to 3,000 calories each day. How can you cut calories and eat healthy meals? Whole grains, vegetables and fruits, and dried beans are good lower-calorie foods. They give you lots of nutrients and fiber. And they fill you up. Sweets, energy drinks, and soda pop are high in calories. They give you few nutrients and no fiber. Try to limit soda pop, fruit juice, and energy drinks. Drink water instead. Some fats can be part of a healthy diet. But cutting back on fats from highly processed foods like fast foods and many snack foods is a good way to lower the calories in your diet. Also, use smaller amounts of fats like butter, margarine, salad dressing, and mayonnaise.  Add fresh garlic, lemon, or herbs to your meals to add flavor without adding fat. Meats and dairy products can be a big source of hidden fats. Try to choose lean or low-fat versions of these products. Fat-free cookies, candies, chips, and frozen treats can still be high in sugar and calories. Some fat-free foods have more calories than regular ones. Eat fat-free treats in moderation, as you would other foods. If your favorite foods are high in fat, salt, sugar, or calories, limit how often you eat them. Eat smaller servings, or look for healthy substitutes. Fill up on fruits, vegetables, and whole grains. Eating at home  · Use meat as a side dish instead of as the main part of your meal.  · Try main dishes that use whole wheat pasta, brown rice, dried beans, or vegetables. · Find ways to cook with little or no fat, such as broiling, steaming, or grilling. · Use cooking spray instead of oil. If you use oil, use a monounsaturated oil, such as canola or olive oil. · Trim fat from meats before you cook them. · Drain off fat after you brown the meat or while you roast it. · Chill soups and stews after you cook them. Then skim the fat off the top after it hardens. Eating out  · Order foods that are broiled or poached rather than fried or breaded. · Cut back on the amount of butter or margarine that you use on bread. · Order sauces, gravies, and salad dressings on the side, and use only a little. · When you order pasta, choose tomato sauce rather than cream sauce. · Ask for salsa with your baked potato instead of sour cream, butter, cheese, or brock. · Order meals in a small size instead of upgrading to a large. · Share an entree, or take part of your food home to eat as another meal.  · Share appetizers and desserts. Where can you learn more? Go to http://francis-elise.info/. Enter 99 [id number] in the search box to learn more about \"Learning About Cutting Calories. \"  Current as of: November 7, 2018  Content Version: 12.2  © 2741-7009 [employer]. Care instructions adapted under license by [employer] (which disclaims liability or warranty for this information). If you have questions about a medical condition or this instruction, always ask your healthcare professional. [address] any warranty or liability for your use of this information.

## 2020-02-12 NOTE — PROGRESS NOTES
Chief Complaint   Patient presents with    Follow Up Chronic Condition     6 mo fu    Annual Wellness Visit       Depression Risk Factor Screening:     3 most recent PHQ Screens 2/12/2020   Little interest or pleasure in doing things Not at all   Feeling down, depressed, irritable, or hopeless Not at all   Total Score PHQ 2 0       Functional Ability and Level of Safety:     Activities of Daily Living  ADL Assessment 2/12/2020   Feeding yourself No Help Needed   Getting from bed to chair No Help Needed   Getting dressed No Help Needed   Bathing or showering No Help Needed   Walk across the room (includes cane/walker) No Help Needed   Using the telphone No Help Needed   Taking your medications No Help Needed   Preparing meals No Help Needed   Managing money (expenses/bills) No Help Needed   Moderately strenuous housework (laundry) No Help Needed   Shopping for personal items (toiletries/medicines) No Help Needed   Shopping for groceries No Help Needed   Driving No Help Needed   Climbing a flight of stairs No Help Needed   Getting to places beyond walking distances No Help Needed       Fall Risk  Fall Risk Assessment, last 12 mths 2/12/2020   Able to walk? Yes   Fall in past 12 months? No       Abuse Screen  Abuse Screening Questionnaire 2/12/2020   Do you ever feel afraid of your partner? N   Are you in a relationship with someone who physically or mentally threatens you? N   Is it safe for you to go home?  Y         Patient Care Team   Patient Care Team:  Steve Hinton MD as PCP - General (Internal Medicine)  Steve Hinton MD as PCP [facility]

## 2020-02-13 LAB
25(OH)D3 SERPL-MCNC: 39 NG/ML (ref 30–96)
BASOPHILS # BLD AUTO: 0.1 X10E3/UL (ref 0–0.2)
BASOPHILS NFR BLD AUTO: 1 %
EOSINOPHIL # BLD AUTO: 0.2 X10E3/UL (ref 0–0.4)
EOSINOPHIL NFR BLD AUTO: 3 %
ERYTHROCYTE [DISTWIDTH] IN BLOOD BY AUTOMATED COUNT: 12 % (ref 11.7–15.4)
HCT VFR BLD AUTO: 43.5 % (ref 34–46.6)
HGB BLD-MCNC: 14.7 G/DL (ref 11.1–15.9)
IMM GRANULOCYTES # BLD AUTO: 0 X10E3/UL (ref 0–0.1)
IMM GRANULOCYTES NFR BLD AUTO: 0 %
LYMPHOCYTES # BLD AUTO: 1.5 X10E3/UL (ref 0.7–3.1)
LYMPHOCYTES NFR BLD AUTO: 31 %
MCH RBC QN AUTO: 30.9 PG (ref 26.6–33)
MCHC RBC AUTO-ENTMCNC: 33.8 G/DL (ref 31.5–35.7)
MCV RBC AUTO: 91 FL (ref 79–97)
MONOCYTES # BLD AUTO: 0.9 X10E3/UL (ref 0.1–0.9)
MONOCYTES NFR BLD AUTO: 19 %
NEUTROPHILS # BLD AUTO: 2.2 X10E3/UL (ref 1.4–7)
NEUTROPHILS NFR BLD AUTO: 46 %
PLATELET # BLD AUTO: 277 X10E3/UL (ref 150–450)
RBC # BLD AUTO: 4.76 X10E6/UL (ref 3.77–5.28)
TSH SERPL DL<=0.05 MIU/L-ACNC: 1.17 UIU/ML (ref 0.34–5.6)
WBC # BLD AUTO: 4.8 X10E3/UL (ref 3.4–10.8)

## 2020-02-19 ENCOUNTER — HOSPITAL ENCOUNTER (OUTPATIENT)
Dept: MAMMOGRAPHY | Age: 75
Discharge: HOME | End: 2020-02-19
Attending: INTERNAL MEDICINE
Payer: MEDICARE

## 2020-02-19 DIAGNOSIS — Z12.31 VISIT FOR SCREENING MAMMOGRAM: ICD-10-CM

## 2020-02-19 PROCEDURE — 77067 SCR MAMMO BI INCL CAD: CPT

## 2020-08-12 ENCOUNTER — OFFICE VISIT (OUTPATIENT)
Dept: INTERNAL MEDICINE CLINIC | Age: 75
End: 2020-08-12
Payer: MEDICARE

## 2020-08-12 VITALS
HEIGHT: 65 IN | HEART RATE: 68 BPM | SYSTOLIC BLOOD PRESSURE: 130 MMHG | BODY MASS INDEX: 25.79 KG/M2 | WEIGHT: 154.8 LBS | RESPIRATION RATE: 18 BRPM | TEMPERATURE: 97.9 F | OXYGEN SATURATION: 99 % | DIASTOLIC BLOOD PRESSURE: 82 MMHG

## 2020-08-12 DIAGNOSIS — K21.9 GASTROESOPHAGEAL REFLUX DISEASE WITHOUT ESOPHAGITIS: ICD-10-CM

## 2020-08-12 DIAGNOSIS — E55.9 VITAMIN D DEFICIENCY: ICD-10-CM

## 2020-08-12 DIAGNOSIS — M15.9 PRIMARY OSTEOARTHRITIS INVOLVING MULTIPLE JOINTS: ICD-10-CM

## 2020-08-12 DIAGNOSIS — M81.0 AGE-RELATED OSTEOPOROSIS WITHOUT CURRENT PATHOLOGICAL FRACTURE: ICD-10-CM

## 2020-08-12 DIAGNOSIS — I10 ESSENTIAL HYPERTENSION: Primary | ICD-10-CM

## 2020-08-12 PROCEDURE — G8419 CALC BMI OUT NRM PARAM NOF/U: HCPCS | Performed by: INTERNAL MEDICINE

## 2020-08-12 PROCEDURE — 3017F COLORECTAL CA SCREEN DOC REV: CPT | Performed by: INTERNAL MEDICINE

## 2020-08-12 PROCEDURE — G8752 SYS BP LESS 140: HCPCS | Performed by: INTERNAL MEDICINE

## 2020-08-12 PROCEDURE — G8427 DOCREV CUR MEDS BY ELIG CLIN: HCPCS | Performed by: INTERNAL MEDICINE

## 2020-08-12 PROCEDURE — G8536 NO DOC ELDER MAL SCRN: HCPCS | Performed by: INTERNAL MEDICINE

## 2020-08-12 PROCEDURE — G8432 DEP SCR NOT DOC, RNG: HCPCS | Performed by: INTERNAL MEDICINE

## 2020-08-12 PROCEDURE — 99214 OFFICE O/P EST MOD 30 MIN: CPT | Performed by: INTERNAL MEDICINE

## 2020-08-12 PROCEDURE — 1090F PRES/ABSN URINE INCON ASSESS: CPT | Performed by: INTERNAL MEDICINE

## 2020-08-12 PROCEDURE — G8754 DIAS BP LESS 90: HCPCS | Performed by: INTERNAL MEDICINE

## 2020-08-12 PROCEDURE — 1101F PT FALLS ASSESS-DOCD LE1/YR: CPT | Performed by: INTERNAL MEDICINE

## 2020-08-12 NOTE — PROGRESS NOTES
This note will not be viewable in [address] Kim Richardson is a 75 y.o. female and presents with Follow Up Chronic Condition (6 mo fu)  . Subjective:  Mrs. Ryne Moreland presents to the office today in follow-up of multiple medical problems. The patient has hypertension currently on hydrochlorothiazide, amlodipine and benazepril. She tolerates this regimen without any orthostatic dizziness, muscle cramping, lower extremity edema or cough. She denies headaches, numbness, tingling or focal neurological problems. GERD is currently managed on PPI therapy. She denies any breakthrough heartburn and has had no dysphagia, early satiety or unexplained weight loss. She is taking Tylenol PM to help with her sleep related to chronic underlying pain related to degenerative arthritis. She has had no recent flares but chronically notes some joint stiffness especially in her neck. She denies any radicular discomfort. The patient has osteoporosis and vitamin D deficiency. She currently is on Prolia injections as well as a vitamin D supplement. Vitamin D level in February was normal.  She will be due for a follow-up bone density in January 2021. There have been no falls or fractures.     Past Medical History:   Diagnosis Date    Allergic rhinitis 8/21/2017    Breast cancer ([address] 2009    Right breast     Diverticulosis of large intestine without hemorrhage 8/21/2017    DJD (degenerative joint disease) 8/21/2017    GERD (gastroesophageal reflux disease) 8/21/2017    Hypertension 8/21/2017    Internal hemorrhoid 8/21/2017    Lumbar spinal stenosis 9/20/2017    Osteopenia 8/21/2017    S/P radiation therapy 2009    Swelling of thumb, right 8/21/2017    TMJ dysfunction 4/5/2018    Vitamin D deficiency 8/21/2017     Past Surgical History:   Procedure Laterality Date    HX BREAST BIOPSY Right 2008    positive ultrasound core bx    HX BREAST BIOPSY Bilateral 2008    benign mri bx    HX BREAST LUMPECTOMY Right 2009    x2    HX COLONOSCOPY      HX DILATION AND CURETTAGE      HX TONSILLECTOMY       Allergies   Allergen Reactions    Sulfa (Sulfonamide Antibiotics) Unknown (comments)     Current Outpatient Medications   Medication Sig Dispense Refill    omeprazole (PRILOSEC) 10 mg capsule Take 10 mg by mouth daily.  hydroCHLOROthiazide (HYDRODIURIL) 25 mg tablet Take 1 Tab by mouth daily. 90 Tab 3    amLODIPine (NORVASC) 5 mg tablet Take 1 Tab by mouth daily. 90 Tab 3    benazepriL (LOTENSIN) 20 mg tablet Take 1 Tab by mouth daily. 90 Tab 3    naproxen sod/diphenhydramine (ALEVE PM PO) Take  by mouth nightly as needed.  denosumab (PROLIA) 60 mg/mL injection 60 mg by SubCUTAneous route every 6 months.  MULTIVITAMIN (MULTIPLE VITAMIN PO) Take  by mouth.        Social History     Socioeconomic History    Marital status:      Spouse name: Not on file    Number of children: 3    Years of education: Not on file    Highest education level: Not on file   Tobacco Use    Smoking status: Never Smoker    Smokeless tobacco: Never Used   Substance and Sexual Activity    Alcohol use: No     Family History   Problem Relation Age of Onset    Breast Cancer Sister 61    COPD Mother     Stroke Father        Health Maintenance   Topic Date Due    DTaP/Tdap/Td series (1 - Tdap) 08/21/1965    Shingrix Vaccine Age 50> (1 of 2) 08/21/1994    Influenza Age 5 to Adult  08/01/2020    Medicare Yearly Exam  02/12/2021    GLAUCOMA SCREENING Q2Y  10/02/2021    Lipid Screen  02/12/2025    Bone Densitometry (Dexa) Screening  Completed    Pneumococcal 65+ years  Completed        Review of Systems  Constitutional: negative for fevers, chills, anorexia and weight loss  Eyes:   negative for visual disturbance and irritation  ENT:   negative for tinnitus,sore throat,nasal congestion,ear pain,hoarseness  Respiratory:  negative for cough, hemoptysis, dyspnea,wheezing  CV:   negative for chest pain, palpitations, lower extremity edema  GI:   negative for nausea, vomiting, diarrhea, abdominal pain,melena  Endo:               negative for polyuria,polydipsia,polyphagia,heat intolerance  Genitourinary: negative for frequency, dysuria and hematuria  Integumentary: negative for rash and pruritus  Hematologic:  negative for easy bruising and gum/nose bleeding  Musculoskel: negative for myalgias, arthralgias, back pain, muscle weakness, joint pain  Neurological:  negative for headaches, dizziness, vertigo, memory problems and gait   Behavl/Psych: negative for feelings of anxiety, depression, mood changes  ROS otherwise negative      Objective:  Visit Vitals  /82 (BP 1 Location: Left arm, BP Patient Position: Sitting)   Pulse 68   Temp 97.9 °F (36.6 °C) (Oral)   Resp 18   Ht 5' 4.5\" (1.638 m)   Wt 154 lb 12.8 oz (70.2 kg)   LMP  (LMP Unknown)   SpO2 99%   BMI 26.16 kg/m²     Body mass index is 26.16 kg/m². Physical Exam:   General appearance - alert, well appearing, and in no distress  Mental status - alert, oriented to person, place, and time  EYE-LOS, EOMI,conjunctiva normal bilaterally, lids normal  ENT-ENT exam normal, no neck nodes or sinus tenderness  Nose - normal and patent, no erythema,  Or discharge   Mouth - mucous membranes moist, pharynx normal without lesions  Neck - supple, no significant adenopathy or bruit  Chest - clear to auscultation, no wheezes, rales or rhonchi. Heart - normal rate, regular rhythm, normal S1, S2, no murmurs, rubs, clicks or gallops   Abdomen - soft, nontender, nondistended, no masses or organomegaly  Lymph- no adenopathy palpable  Ext-peripheral pulses normal, no pedal edema, no clubbing or cyanosis  Skin-Warm and dry.  no hyperpigmentation, vitiligo, or suspicious lesions  Neuro -alert, oriented, normal speech, no focal findings or movement disorder noted      Assessment/Plan:  Diagnoses and all orders for this visit:    Essential hypertension    Gastroesophageal reflux disease without esophagitis    Age-related osteoporosis without current pathological fracture    Vitamin D deficiency    Primary osteoarthritis involving multiple joints        Other instructions: The patient's medications were reviewed and reconciled. The only change I have recommended is possibly taking 2 Aleve in the morning to see if this helps with her arthritic pain. A no added salt, prudent diet is encouraged    Have recommended influenza vaccination in September    Labs from 2/12 were reviewed with the patient today. Follow-up 6 months    Follow-up and Dispositions    · Return in about 6 months (around 2/12/2021). I have reviewed with the patient details of the assessment and plan and all questions were answered. Relevent patient education was performed. The most recent lab findings were reviewed with the patient. An After Visit Summary was printed and given to the patient. Marika Trammell MD    Please note that this dictation was completed with Giggle, the computer voice recognition software. Quite often unanticipated grammatical, syntax, homophones, and other interpretive errors are inadvertently transcribed by the computer software. Please disregard these errors. Please excuse any errors that have escaped final proofreading.

## 2020-08-12 NOTE — PROGRESS NOTES
Haris Kathleen is a 75 y.o. female presenting for Follow Up Chronic Condition (6 mo fu)  . 1. Have you been to the ER, urgent care clinic since your last visit? Hospitalized since your last visit? No    2. Have you seen or consulted any other health care providers outside of the [address] [place] [address] since your last visit? Include any pap smears or colon screening. Dr Alvarado Batch- 7-13-20 follow up    Fall Risk Assessment, last 12 mths 2/12/2020   Able to walk? Yes   Fall in past 12 months? No         Abuse Screening Questionnaire 2/12/2020   Do you ever feel afraid of your partner? N   Are you in a relationship with someone who physically or mentally threatens you? N   Is it safe for you to go home? Y       3 most recent PHQ Screens 2/12/2020   Little interest or pleasure in doing things Not at all   Feeling down, depressed, irritable, or hopeless Not at all   Total Score PHQ 2 0       There are no discontinued medications.

## 2020-08-12 NOTE — PATIENT INSTRUCTIONS
DASH Diet: Care Instructions  Your Care Instructions     The DASH diet is an eating plan that can help lower your blood pressure. DASH stands for Dietary Approaches to Stop Hypertension. Hypertension is high blood pressure. The DASH diet focuses on eating foods that are high in calcium, potassium, and magnesium. These nutrients can lower blood pressure. The foods that are highest in these nutrients are fruits, vegetables, low-fat dairy products, nuts, seeds, and legumes. But taking calcium, potassium, and magnesium supplements instead of eating foods that are high in those nutrients does not have the same effect. The DASH diet also includes whole grains, fish, and poultry. The DASH diet is one of several lifestyle changes your doctor may recommend to lower your high blood pressure. Your doctor may also want you to decrease the amount of sodium in your diet. Lowering sodium while following the DASH diet can lower blood pressure even further than just the DASH diet alone. Follow-up care is a key part of your treatment and safety. Be sure to make and go to all appointments, and call your doctor if you are having problems. It's also a good idea to know your test results and keep a list of the medicines you take. How can you care for yourself at home? Following the DASH diet  · Eat 4 to 5 servings of fruit each day. A serving is 1 medium-sized piece of fruit, ½ cup chopped or canned fruit, 1/4 cup dried fruit, or 4 ounces (½ cup) of fruit juice. Choose fruit more often than fruit juice. · Eat 4 to 5 servings of vegetables each day. A serving is 1 cup of lettuce or raw leafy vegetables, ½ cup of chopped or cooked vegetables, or 4 ounces (½ cup) of vegetable juice. Choose vegetables more often than vegetable juice. · Get 2 to 3 servings of low-fat and fat-free dairy each day. A serving is 8 ounces of milk, 1 cup of yogurt, or 1 ½ ounces of cheese. · Eat 6 to 8 servings of grains each day.  A serving is 1 slice of bread, 1 ounce of dry cereal, or ½ cup of cooked rice, pasta, or cooked cereal. Try to choose whole-grain products as much as possible. · Limit lean meat, poultry, and fish to 2 servings each day. A serving is 3 ounces, about the size of a deck of cards. · Eat 4 to 5 servings of nuts, seeds, and legumes (cooked dried beans, lentils, and split peas) each week. A serving is 1/3 cup of nuts, 2 tablespoons of seeds, or ½ cup of cooked beans or peas. · Limit fats and oils to 2 to 3 servings each day. A serving is 1 teaspoon of vegetable oil or 2 tablespoons of salad dressing. · Limit sweets and added sugars to 5 servings or less a week. A serving is 1 tablespoon jelly or jam, ½ cup sorbet, or 1 cup of lemonade. · Eat less than 2,300 milligrams (mg) of sodium a day. If you limit your sodium to 1,500 mg a day, you can lower your blood pressure even more. Tips for success  · Start small. Do not try to make dramatic changes to your diet all at once. You might feel that you are missing out on your favorite foods and then be more likely to not follow the plan. Make small changes, and stick with them. Once those changes become habit, add a few more changes. · Try some of the following:  ? Make it a goal to eat a fruit or vegetable at every meal and at snacks. This will make it easy to get the recommended amount of fruits and vegetables each day. ? Try yogurt topped with fruit and nuts for a snack or healthy dessert. ? Add lettuce, tomato, cucumber, and onion to sandwiches. ? Combine a ready-made pizza crust with low-fat mozzarella cheese and lots of vegetable toppings. Try using tomatoes, squash, spinach, broccoli, carrots, cauliflower, and onions. ? Have a variety of cut-up vegetables with a low-fat dip as an appetizer instead of chips and dip. ? Sprinkle sunflower seeds or chopped almonds over salads. Or try adding chopped walnuts or almonds to cooked vegetables.   ? Try some vegetarian meals using beans and peas. Add garbanzo or kidney beans to salads. Make burritos and tacos with mashed tinsley beans or black beans. Where can you learn more? Go to http://francis-elise.info/  Enter H967 in the search box to learn more about \"DASH Diet: Care Instructions. \"  Current as of: December 16, 2019               Content Version: 12.5  © 3394-0393 [employer]. Care instructions adapted under license by [employer] (which disclaims liability or warranty for this information). If you have questions about a medical condition or this instruction, always ask your healthcare professional. [address] any warranty or liability for your use of this information.

## 2020-08-18 ENCOUNTER — OFFICE VISIT (OUTPATIENT)
Dept: INTERNAL MEDICINE CLINIC | Age: 75
End: 2020-08-18
Payer: MEDICARE

## 2020-08-18 ENCOUNTER — TELEPHONE (OUTPATIENT)
Dept: INTERNAL MEDICINE CLINIC | Age: 75
End: 2020-08-18

## 2020-08-18 VITALS
HEART RATE: 113 BPM | OXYGEN SATURATION: 97 % | WEIGHT: 156 LBS | HEIGHT: 65 IN | SYSTOLIC BLOOD PRESSURE: 126 MMHG | BODY MASS INDEX: 25.99 KG/M2 | DIASTOLIC BLOOD PRESSURE: 72 MMHG | TEMPERATURE: 98.3 F | RESPIRATION RATE: 20 BRPM

## 2020-08-18 DIAGNOSIS — B02.9 HERPES ZOSTER WITHOUT COMPLICATION: Primary | ICD-10-CM

## 2020-08-18 PROCEDURE — 1090F PRES/ABSN URINE INCON ASSESS: CPT | Performed by: INTERNAL MEDICINE

## 2020-08-18 PROCEDURE — G8432 DEP SCR NOT DOC, RNG: HCPCS | Performed by: INTERNAL MEDICINE

## 2020-08-18 PROCEDURE — G8536 NO DOC ELDER MAL SCRN: HCPCS | Performed by: INTERNAL MEDICINE

## 2020-08-18 PROCEDURE — 1101F PT FALLS ASSESS-DOCD LE1/YR: CPT | Performed by: INTERNAL MEDICINE

## 2020-08-18 PROCEDURE — G8754 DIAS BP LESS 90: HCPCS | Performed by: INTERNAL MEDICINE

## 2020-08-18 PROCEDURE — 99213 OFFICE O/P EST LOW 20 MIN: CPT | Performed by: INTERNAL MEDICINE

## 2020-08-18 PROCEDURE — G8419 CALC BMI OUT NRM PARAM NOF/U: HCPCS | Performed by: INTERNAL MEDICINE

## 2020-08-18 PROCEDURE — G8427 DOCREV CUR MEDS BY ELIG CLIN: HCPCS | Performed by: INTERNAL MEDICINE

## 2020-08-18 PROCEDURE — 3017F COLORECTAL CA SCREEN DOC REV: CPT | Performed by: INTERNAL MEDICINE

## 2020-08-18 PROCEDURE — G8752 SYS BP LESS 140: HCPCS | Performed by: INTERNAL MEDICINE

## 2020-08-18 RX ORDER — TRAMADOL HYDROCHLORIDE 50 MG/1
50 TABLET ORAL
Qty: 28 TAB | Refills: 0 | Status: SHIPPED | OUTPATIENT
Start: 2020-08-18 | End: 2020-08-21

## 2020-08-18 RX ORDER — FAMCICLOVIR 500 MG/1
500 TABLET ORAL 3 TIMES DAILY
Qty: 21 TAB | Refills: 0 | Status: SHIPPED | OUTPATIENT
Start: 2020-08-18 | End: 2020-08-25

## 2020-08-18 NOTE — PATIENT INSTRUCTIONS

## 2020-08-18 NOTE — PROGRESS NOTES
Irving Curtis is a 75 y.o. female presenting for Rash (face)  . 1. Have you been to the ER, urgent care clinic since your last visit? Hospitalized since your last visit? No    2. Have you seen or consulted any other health care providers outside of the [address] [place] [address] since your last visit? Include any pap smears or colon screening. No    Fall Risk Assessment, last 12 mths 2/12/2020   Able to walk? Yes   Fall in past 12 months? No         Abuse Screening Questionnaire 2/12/2020   Do you ever feel afraid of your partner? N   Are you in a relationship with someone who physically or mentally threatens you? N   Is it safe for you to go home? Y       3 most recent PHQ Screens 2/12/2020   Little interest or pleasure in doing things Not at all   Feeling down, depressed, irritable, or hopeless Not at all   Total Score PHQ 2 0       There are no discontinued medications.

## 2020-08-18 NOTE — TELEPHONE ENCOUNTER
Patient's  called and states they are out of town but they think she has shingles. He states she has red spots on her face. He would like to be seen today.  They will be in town after 12:00pm.    Dariel Matson [facility]  [phone] - Kiki's cell

## 2020-08-18 NOTE — PROGRESS NOTES
This note will not be viewable in [address] Subjective:     Mrs. Uche Johnson presents to the office today with the development of a painful rash that started on her right forehead 24 hours ago. She had no prodrome. She denies fevers or chills. She notes that there is intermittent pain in the forehead but she denies any involvement of her eye. She denies any neck inflammation. Past Medical History:   Diagnosis Date    Allergic rhinitis 8/21/2017    Breast cancer ([address] 2009    Right breast     Diverticulosis of large intestine without hemorrhage 8/21/2017    DJD (degenerative joint disease) 8/21/2017    GERD (gastroesophageal reflux disease) 8/21/2017    Hypertension 8/21/2017    Internal hemorrhoid 8/21/2017    Lumbar spinal stenosis 9/20/2017    Osteopenia 8/21/2017    S/P radiation therapy 2009    Swelling of thumb, right 8/21/2017    TMJ dysfunction 4/5/2018    Vitamin D deficiency 8/21/2017     Past Surgical History:   Procedure Laterality Date    HX BREAST BIOPSY Right 2008    positive ultrasound core bx    HX BREAST BIOPSY Bilateral 2008    benign mri bx    HX BREAST LUMPECTOMY Right 2009    x2    HX COLONOSCOPY      HX DILATION AND CURETTAGE      HX TONSILLECTOMY       Allergies   Allergen Reactions    Sulfa (Sulfonamide Antibiotics) Unknown (comments)     Current Outpatient Medications   Medication Sig Dispense Refill    famciclovir (FAMVIR) 500 mg tablet Take 1 Tab by mouth three (3) times daily for 7 days. 21 Tab 0    traMADoL (ULTRAM) 50 mg tablet Take 1 Tab by mouth every six (6) hours as needed for Pain for up to 3 days. Max Daily Amount: 200 mg. 28 Tab 0    omeprazole (PRILOSEC) 10 mg capsule Take 10 mg by mouth daily.  hydroCHLOROthiazide (HYDRODIURIL) 25 mg tablet Take 1 Tab by mouth daily. 90 Tab 3    amLODIPine (NORVASC) 5 mg tablet Take 1 Tab by mouth daily. 90 Tab 3    benazepriL (LOTENSIN) 20 mg tablet Take 1 Tab by mouth daily.  90 Tab 3    naproxen sod/diphenhydramine (ALEVE PM PO) Take  by mouth nightly as needed.  denosumab (PROLIA) 60 mg/mL injection 60 mg by SubCUTAneous route every 6 months.  MULTIVITAMIN (MULTIPLE VITAMIN PO) Take  by mouth. Social History     Socioeconomic History    Marital status:      Spouse name: Not on file    Number of children: 3    Years of education: Not on file    Highest education level: Not on file   Tobacco Use    Smoking status: Never Smoker    Smokeless tobacco: Never Used   Substance and Sexual Activity    Alcohol use: No     Family History   Problem Relation Age of Onset    Breast Cancer Sister 61    COPD Mother     Stroke Father        Review of Systems:  GEN: no weight loss, weight gain, fatigue or night sweats  CV: no PND, orthopnea, or palpitations  Resp: no dyspnea on exertion, no cough  Abd: no nausea, vomiting or diarrhea  EXT: denies edema, claudication  Endocrine: no hair loss, excessive thirst or polyuria  Neurological ROS: no TIA or stroke symptoms  ROS otherwise negative      Objective:     Visit Vitals  /72 (BP 1 Location: Left arm, BP Patient Position: Sitting)   Pulse (!) 113   Temp 98.3 °F (36.8 °C) (Oral)   Resp 20   Ht 5' 4.5\" (1.638 m)   Wt 156 lb (70.8 kg)   LMP  (LMP Unknown)   SpO2 97%   BMI 26.36 kg/m²     Body mass index is 26.36 kg/m². General:   alert, cooperative and no distress   Eyes: conjunctivae/sclerae clear. PERRL, EOM's intact   Mouth:  No oral lesions, no pharyngeal erythema, no exudates   Neck: Trachea midline, no thyromegaly, no bruits   Heart: S1 and S2 normal,no murmurs noted    Lungs: Clear to auscultation bilaterally, no increased work of breathing   Derm:  A herpetic rash is identified in the right scalp extending to the right forehead. There is a lesion right at the medial end of her eyebrow but I see no lesions below the eyebrow near the eye. There is no involvement of the rash on her nose.    Neuro: ..alert, oriented x3,speech normal in context and clarity, cranial nerves II-XII intact,motor strength: full proximally and distally,gait: normal  reflexes: full and symmetric     Physical exam otherwise negative         Assessment/Plan:     Diagnoses and all orders for this visit:    Herpes zoster without complication  -     famciclovir (FAMVIR) 500 mg tablet; Take 1 Tab by mouth three (3) times daily for 7 days. , Normal, Disp-21 Tab,R-0  -     traMADoL (ULTRAM) 50 mg tablet; Take 1 Tab by mouth every six (6) hours as needed for Pain for up to 3 days. Max Daily Amount: 200 mg., Normal, Disp-28 Tab,R-0        Other instructions:   Occasions were reviewed and reconciled. Famvir will be started for 7 days and I have given her tramadol for pain. I have recommended an ophthalmology evaluation as the rash may continue to spread over the next several days and we want to make sure that she does not have any eye involvement prior to the weekend. She has a eye specialist in [address] [place] [address] and will call him when she gets home to have this arranged. If pain is not controlled with as needed tramadol consider start of Lyrica    Follow-up if there is worsening    Follow-up and Dispositions    · Return if symptoms worsen or fail to improve. Kelsy Simon MD    Please note that this dictation was completed with [employer]jimo, the computer voice recognition software. Quite often unanticipated grammatical, syntax, homophones, and other interpretive errors are inadvertently transcribed by the computer software. Please disregard these errors. Please excuse any errors that have escaped final proofreading.

## 2021-02-09 ENCOUNTER — OFFICE VISIT (OUTPATIENT)
Dept: INTERNAL MEDICINE CLINIC | Age: 76
End: 2021-02-09
Payer: MEDICARE

## 2021-02-09 VITALS
HEART RATE: 66 BPM | RESPIRATION RATE: 16 BRPM | SYSTOLIC BLOOD PRESSURE: 122 MMHG | WEIGHT: 156.4 LBS | HEIGHT: 65 IN | OXYGEN SATURATION: 99 % | BODY MASS INDEX: 26.06 KG/M2 | DIASTOLIC BLOOD PRESSURE: 70 MMHG | TEMPERATURE: 98.4 F

## 2021-02-09 DIAGNOSIS — B02.9 HERPES ZOSTER WITHOUT COMPLICATION: ICD-10-CM

## 2021-02-09 DIAGNOSIS — H25.13 CATARACT, NUCLEAR SCLEROTIC SENILE, BILATERAL: ICD-10-CM

## 2021-02-09 DIAGNOSIS — I10 ESSENTIAL HYPERTENSION: ICD-10-CM

## 2021-02-09 DIAGNOSIS — Z01.818 PREOPERATIVE CLEARANCE: Primary | ICD-10-CM

## 2021-02-09 PROCEDURE — G8754 DIAS BP LESS 90: HCPCS | Performed by: INTERNAL MEDICINE

## 2021-02-09 PROCEDURE — G8536 NO DOC ELDER MAL SCRN: HCPCS | Performed by: INTERNAL MEDICINE

## 2021-02-09 PROCEDURE — G8427 DOCREV CUR MEDS BY ELIG CLIN: HCPCS | Performed by: INTERNAL MEDICINE

## 2021-02-09 PROCEDURE — 1101F PT FALLS ASSESS-DOCD LE1/YR: CPT | Performed by: INTERNAL MEDICINE

## 2021-02-09 PROCEDURE — 1090F PRES/ABSN URINE INCON ASSESS: CPT | Performed by: INTERNAL MEDICINE

## 2021-02-09 PROCEDURE — G8752 SYS BP LESS 140: HCPCS | Performed by: INTERNAL MEDICINE

## 2021-02-09 PROCEDURE — G8510 SCR DEP NEG, NO PLAN REQD: HCPCS | Performed by: INTERNAL MEDICINE

## 2021-02-09 PROCEDURE — 99214 OFFICE O/P EST MOD 30 MIN: CPT | Performed by: INTERNAL MEDICINE

## 2021-02-09 PROCEDURE — G8419 CALC BMI OUT NRM PARAM NOF/U: HCPCS | Performed by: INTERNAL MEDICINE

## 2021-02-09 RX ORDER — AMLODIPINE BESYLATE 5 MG/1
5 TABLET ORAL DAILY
Qty: 90 TAB | Refills: 2 | Status: SHIPPED | OUTPATIENT
Start: 2021-02-09 | End: 2021-11-15

## 2021-02-09 RX ORDER — HYDROCHLOROTHIAZIDE 25 MG/1
25 TABLET ORAL DAILY
Qty: 90 TAB | Refills: 2 | Status: SHIPPED | OUTPATIENT
Start: 2021-02-09 | End: 2021-11-15

## 2021-02-09 RX ORDER — BENAZEPRIL HYDROCHLORIDE 20 MG/1
20 TABLET ORAL DAILY
Qty: 90 TAB | Refills: 2 | Status: SHIPPED | OUTPATIENT
Start: 2021-02-09 | End: 2021-11-15

## 2021-02-09 NOTE — PROGRESS NOTES
Maritza Soulier is a 67 y.o. female presenting for Pre-op Exam  .     1. Have you been to the ER, urgent care clinic since your last visit? Hospitalized since your last visit? No    2. Have you seen or consulted any other health care providers outside of the [address] [place] [address] since your last visit? Include any pap smears or colon screening. Eye Dr Odell [place], last 12 mths 2/9/2021   Able to walk? Yes   Fall in past 12 months? 0   Do you feel unsteady? 0   Are you worried about falling 0         Abuse Screening Questionnaire 2/12/2020   Do you ever feel afraid of your partner? N   Are you in a relationship with someone who physically or mentally threatens you? N   Is it safe for you to go home? Y       3 most recent PHQ Screens 2/9/2021   Little interest or pleasure in doing things Not at all   Feeling down, depressed, irritable, or hopeless Not at all   Total Score PHQ 2 0       There are no discontinued medications.

## 2021-02-09 NOTE — PATIENT INSTRUCTIONS
DASH Diet: Care Instructions  Your Care Instructions     The DASH diet is an eating plan that can help lower your blood pressure. DASH stands for Dietary Approaches to Stop Hypertension. Hypertension is high blood pressure. The DASH diet focuses on eating foods that are high in calcium, potassium, and magnesium. These nutrients can lower blood pressure. The foods that are highest in these nutrients are fruits, vegetables, low-fat dairy products, nuts, seeds, and legumes. But taking calcium, potassium, and magnesium supplements instead of eating foods that are high in those nutrients does not have the same effect. The DASH diet also includes whole grains, fish, and poultry. The DASH diet is one of several lifestyle changes your doctor may recommend to lower your high blood pressure. Your doctor may also want you to decrease the amount of sodium in your diet. Lowering sodium while following the DASH diet can lower blood pressure even further than just the DASH diet alone. Follow-up care is a key part of your treatment and safety. Be sure to make and go to all appointments, and call your doctor if you are having problems. It's also a good idea to know your test results and keep a list of the medicines you take. How can you care for yourself at home? Following the DASH diet  · Eat 4 to 5 servings of fruit each day. A serving is 1 medium-sized piece of fruit, ½ cup chopped or canned fruit, 1/4 cup dried fruit, or 4 ounces (½ cup) of fruit juice. Choose fruit more often than fruit juice. · Eat 4 to 5 servings of vegetables each day. A serving is 1 cup of lettuce or raw leafy vegetables, ½ cup of chopped or cooked vegetables, or 4 ounces (½ cup) of vegetable juice. Choose vegetables more often than vegetable juice. · Get 2 to 3 servings of low-fat and fat-free dairy each day. A serving is 8 ounces of milk, 1 cup of yogurt, or 1 ½ ounces of cheese. · Eat 6 to 8 servings of grains each day.  A serving is 1 slice of bread, 1 ounce of dry cereal, or ½ cup of cooked rice, pasta, or cooked cereal. Try to choose whole-grain products as much as possible. · Limit lean meat, poultry, and fish to 2 servings each day. A serving is 3 ounces, about the size of a deck of cards. · Eat 4 to 5 servings of nuts, seeds, and legumes (cooked dried beans, lentils, and split peas) each week. A serving is 1/3 cup of nuts, 2 tablespoons of seeds, or ½ cup of cooked beans or peas. · Limit fats and oils to 2 to 3 servings each day. A serving is 1 teaspoon of vegetable oil or 2 tablespoons of salad dressing. · Limit sweets and added sugars to 5 servings or less a week. A serving is 1 tablespoon jelly or jam, ½ cup sorbet, or 1 cup of lemonade. · Eat less than 2,300 milligrams (mg) of sodium a day. If you limit your sodium to 1,500 mg a day, you can lower your blood pressure even more. Tips for success  · Start small. Do not try to make dramatic changes to your diet all at once. You might feel that you are missing out on your favorite foods and then be more likely to not follow the plan. Make small changes, and stick with them. Once those changes become habit, add a few more changes. · Try some of the following:  ? Make it a goal to eat a fruit or vegetable at every meal and at snacks. This will make it easy to get the recommended amount of fruits and vegetables each day. ? Try yogurt topped with fruit and nuts for a snack or healthy dessert. ? Add lettuce, tomato, cucumber, and onion to sandwiches. ? Combine a ready-made pizza crust with low-fat mozzarella cheese and lots of vegetable toppings. Try using tomatoes, squash, spinach, broccoli, carrots, cauliflower, and onions. ? Have a variety of cut-up vegetables with a low-fat dip as an appetizer instead of chips and dip. ? Sprinkle sunflower seeds or chopped almonds over salads. Or try adding chopped walnuts or almonds to cooked vegetables.   ? Try some vegetarian meals using beans and peas. Add garbanzo or kidney beans to salads. Make burritos and tacos with mashed tinsley beans or black beans. Where can you learn more? Go to http://www.house.com/  Enter H967 in the search box to learn more about \"DASH Diet: Care Instructions. \"  Current as of: December 16, 2019               Content Version: 12.6  © 6120-1492 [employer]. Care instructions adapted under license by [employer] (which disclaims liability or warranty for this information). If you have questions about a medical condition or this instruction, always ask your healthcare professional. [address] any warranty or liability for your use of this information.

## 2021-02-09 NOTE — PROGRESS NOTES
History:   Mrs. Oneyda Perkins is a 78-year-old  female referred to our office today by Dr. Talat Penn in medical consultation for preoperative medical clearance prior to having staged bilateral cataract surgery performed with the right eye to be done 2/18 in the left eye to be done 3/4 at [facility]. The patient states that she has had problems with worsening of her vision related to her cataracts over a 2-year period of time. Because of the change in her vision and the fact that she is having difficulty using her bifocal she has now electively having the cataracts repaired. The patient's history is remarkable for an outbreak of herpes zoster on her right forehead in August for which she was referred to her ophthalmologist.  She was started on antiviral eyedrops at that time in addition to the oral medication that was started on the day that we saw her in the office. She did have some corneal involvement on the right. The patient gives no history of glaucoma, macular degeneration or retinal problems. She has had no eye pain or drainage. The patient's medical history is pertinent for controlled hypertension currently on hydrochlorothiazide, amlodipine and benazepril. She also has acid reflux which is controlled with PPI therapy. She has no history of cardiac related disease or pulmonary related disease. Allergies include sulfa's drugs. The patient denies latex allergy or Band-Aid adhesive sensitivity. She has never had problems with anesthesia. Review of systems otherwise negative is noted.     Latex Allergy:NO    History of anesthesia reaction: NO:     History of PE/DVT:NO:       Past Medical History:   Diagnosis Date    Allergic rhinitis 8/21/2017    Breast cancer ([address] 2009    Right breast     Diverticulosis of large intestine without hemorrhage 8/21/2017    DJD (degenerative joint disease) 8/21/2017    GERD (gastroesophageal reflux disease) 8/21/2017    Hypertension 8/21/2017    Internal hemorrhoid 8/21/2017    Lumbar spinal stenosis 9/20/2017    Osteopenia 8/21/2017    S/P radiation therapy 2009    Swelling of thumb, right 8/21/2017    TMJ dysfunction 4/5/2018    Vitamin D deficiency 8/21/2017     Past Surgical History:   Procedure Laterality Date    HX BREAST BIOPSY Right 2008    positive ultrasound core bx    HX BREAST BIOPSY Bilateral 2008    benign mri bx    HX BREAST LUMPECTOMY Right 2009    x2    HX COLONOSCOPY      HX DILATION AND CURETTAGE      HX TONSILLECTOMY       Allergies   Allergen Reactions    Sulfa (Sulfonamide Antibiotics) Unknown (comments)     Current Outpatient Medications   Medication Sig Dispense Refill    zinc 50 mg tab tablet Take  by mouth daily.  hydroCHLOROthiazide (HYDRODIURIL) 25 mg tablet Take 1 Tab by mouth daily. 90 Tab 2    amLODIPine (NORVASC) 5 mg tablet Take 1 Tab by mouth daily. 90 Tab 2    benazepriL (LOTENSIN) 20 mg tablet Take 1 Tab by mouth daily. 90 Tab 2    omeprazole (PRILOSEC) 10 mg capsule Take 10 mg by mouth daily.  naproxen sod/diphenhydramine (ALEVE PM PO) Take  by mouth nightly as needed.  denosumab (PROLIA) 60 mg/mL injection 60 mg by SubCUTAneous route every 6 months.  MULTIVITAMIN (MULTIPLE VITAMIN PO) Take  by mouth. Social History     Socioeconomic History    Marital status:      Spouse name: Not on file    Number of children: 3    Years of education: Not on file    Highest education level: Not on file   Tobacco Use    Smoking status: Never Smoker    Smokeless tobacco: Never Used   Substance and Sexual Activity    Alcohol use: No     Family History   Problem Relation Age of Onset    Breast Cancer Sister 61    COPD Mother     Stroke Father        Reviewed PmHx, RxHx, FmHx, SocHx, AllgHx and updated and dated in the chart.     Review of Systems  Constitutional: negative for fevers, chills, anorexia and weight loss  Eyes:   Positive for blurring of vision   ENT:   negative for tinnitus,sore throat,nasal congestion,ear pain,hoarseness  Respiratory:  negative for cough, hemoptysis, dyspnea,wheezing  CV:   negative for chest pain, palpitations, lower extremity edema  GI:   negative for nausea, vomiting, diarrhea, abdominal pain,melena  Endo:               negative for polyuria,polydipsia,polyphagia,heat intolerance  Genitourinary: negative for frequency, dysuria and hematuria  Integumentary: negative for rash and pruritus  Hematologic:  negative for easy bruising and gum/nose bleeding  Musculoskel: negative for myalgias, arthralgias, back pain, muscle weakness, joint pain  Neurological:  negative for headaches, dizziness, vertigo, memory problems and gait   Behavl/Psych: negative for feelings of anxiety, depression, mood changes      Objective:     Vitals:    02/09/21 1326   BP: 122/70   Pulse: 66   Resp: 16   Temp: 98.4 °F (36.9 °C)   TempSrc: Oral   SpO2: 99%   Weight: 156 lb 6.4 oz (70.9 kg)   Height: 5' 4.5\" (1.638 m)     Body mass index is 26.43 kg/m². Physical Examination: General appearance - alert, well appearing, and in no distress and oriented to person, place, and time  Mental status - alert, oriented to person, place, and time, normal mood, behavior, speech, dress, motor activity, and thought processes  Eyes - pupils equal and reactive, extraocular eye movements intact, sclera anicteric, Lids without erythema or swelling.  No discharge eye redness or discharge noted  Ears - bilateral TM's and external ear canals normal, right ear normal, left ear normal  Mouth - mucous membranes moist, pharynx normal without lesions and tongue normal  Neck - supple, no significant adenopathy  Lymphatics - no palpable lymphadenopathy  Chest - clear to auscultation, no wheezes, rales or rhonchi,   Heart - normal rate, regular rhythm, normal S1, S2, no murmurs, rubs, clicks or gallops  Abdomen - soft, nontender, nondistended, no masses or organomegaly  Back exam - full range of motion, no tenderness, palpable spasm or pain on motion  Neurological - alert, oriented, normal speech, no focal findings or movement disorder noted, neck supple without rigidity, cranial nerves II through XII intact, DTR's normal and symmetric, motor and sensory grossly normal bilaterally  Musculoskeletal - no joint tenderness, deformity or swelling  Extremities - peripheral pulses normal, no pedal edema, no clubbing or cyanosis  Skin - normal coloration and turgor, no rashes, no suspicious skin lesions noted  Physical exam otherwise negative    Assessment/ Plan:   Diagnoses and all orders for this visit:    Preoperative clearance    Cataract, nuclear sclerotic senile, bilateral    Essential hypertension  -     hydroCHLOROthiazide (HYDRODIURIL) 25 mg tablet; Take 1 Tab by mouth daily. , Normal, Disp-90 Tab, R-2  -     amLODIPine (NORVASC) 5 mg tablet; Take 1 Tab by mouth daily. , Normal, Disp-90 Tab, R-2  -     benazepriL (LOTENSIN) 20 mg tablet; Take 1 Tab by mouth daily. , Normal, Disp-90 Tab, R-2    Herpes zoster without complication        Other instructions: The patient's medications were reviewed and reconciled. No change in her current medical regimen will be made. Patient already has received prescription for acyclovir to be taken perioperatively with her right eye cataract surgery    The patient is medically stable, at low cardiac risk and cleared for the surgeries as noted in the HPI. Follow-up here otherwise as previously appointed    Follow-up and Dispositions    · Return in about 6 months (around 8/9/2021). I have discussed the diagnosis with the patient and the intended plan as seen in the above orders. The patient has received an after-visit summary and questions were answered concerning future plans. Pt conveyed understanding of plan. Amber Stone MD    Please note that this dictation was completed with [employer], the computer voice recognition software. Quite often unanticipated grammatical, syntax, homophones, and other interpretive errors are inadvertently transcribed by the computer software. Please disregard these errors. Please excuse any errors that have escaped final proofreading. February 24,2021 - Addendum:  The patient remains medically stable for planned surgeries as of this date. Ag Hogan M.D.

## 2021-02-23 ENCOUNTER — TELEPHONE (OUTPATIENT)
Dept: INTERNAL MEDICINE CLINIC | Age: 76
End: 2021-02-23

## 2021-02-23 NOTE — TELEPHONE ENCOUNTER
Patient states she had a pre op for cataracts on 2/9/21. Her first eye will be done on 2/26/21. The second appt is on 3/11/21. It's two days after her pre op expires. The first eye was scheduled last Thursday but due to the ice they rescheduled it for this Friday. Can you write an addendum so the current pre op will be okay or do you need for her to schedule an appt for another pre op?      [phone]

## 2021-03-01 ENCOUNTER — IMMUNIZATION (OUTPATIENT)
Dept: INTERNAL MEDICINE CLINIC | Age: 76
End: 2021-03-01
Payer: MEDICARE

## 2021-03-01 DIAGNOSIS — Z23 ENCOUNTER FOR IMMUNIZATION: Primary | ICD-10-CM

## 2021-03-01 PROCEDURE — 0001A COVID-19, MRNA, LNP-S, PF, 30MCG/0.3ML DOSE(PFIZER): CPT | Performed by: FAMILY MEDICINE

## 2021-03-01 PROCEDURE — 91300 COVID-19, MRNA, LNP-S, PF, 30MCG/0.3ML DOSE(PFIZER): CPT | Performed by: FAMILY MEDICINE

## 2021-03-22 ENCOUNTER — IMMUNIZATION (OUTPATIENT)
Dept: INTERNAL MEDICINE CLINIC | Age: 76
End: 2021-03-22
Payer: MEDICARE

## 2021-03-22 DIAGNOSIS — Z23 ENCOUNTER FOR IMMUNIZATION: Primary | ICD-10-CM

## 2021-03-22 PROCEDURE — 91300 COVID-19, MRNA, LNP-S, PF, 30MCG/0.3ML DOSE(PFIZER): CPT | Performed by: FAMILY MEDICINE

## 2021-03-22 PROCEDURE — 0002A COVID-19, MRNA, LNP-S, PF, 30MCG/0.3ML DOSE(PFIZER): CPT | Performed by: FAMILY MEDICINE

## 2021-04-22 ENCOUNTER — HOSPITAL ENCOUNTER (OUTPATIENT)
Dept: MAMMOGRAPHY | Age: 76
Discharge: HOME | End: 2021-04-22
Attending: INTERNAL MEDICINE

## 2021-04-22 DIAGNOSIS — Z12.31 VISIT FOR SCREENING MAMMOGRAM: ICD-10-CM

## 2021-06-18 ENCOUNTER — HOSPITAL ENCOUNTER (OUTPATIENT)
Dept: MAMMOGRAPHY | Age: 76
Discharge: HOME | End: 2021-06-18
Attending: INTERNAL MEDICINE
Payer: MEDICARE

## 2021-06-18 PROCEDURE — 77067 SCR MAMMO BI INCL CAD: CPT

## 2021-08-13 ENCOUNTER — HOSPITAL ENCOUNTER (OUTPATIENT)
Dept: GENERAL RADIOLOGY | Age: 76
Discharge: HOME | End: 2021-08-13
Payer: MEDICARE

## 2021-08-13 ENCOUNTER — OFFICE VISIT (OUTPATIENT)
Dept: INTERNAL MEDICINE CLINIC | Age: 76
End: 2021-08-13
Payer: MEDICARE

## 2021-08-13 VITALS
RESPIRATION RATE: 16 BRPM | HEART RATE: 83 BPM | BODY MASS INDEX: 24.83 KG/M2 | TEMPERATURE: 98.1 F | HEIGHT: 65 IN | WEIGHT: 149 LBS | OXYGEN SATURATION: 97 % | SYSTOLIC BLOOD PRESSURE: 116 MMHG | DIASTOLIC BLOOD PRESSURE: 70 MMHG

## 2021-08-13 DIAGNOSIS — M15.9 PRIMARY OSTEOARTHRITIS INVOLVING MULTIPLE JOINTS: ICD-10-CM

## 2021-08-13 DIAGNOSIS — K21.9 GASTROESOPHAGEAL REFLUX DISEASE WITHOUT ESOPHAGITIS: ICD-10-CM

## 2021-08-13 DIAGNOSIS — E55.9 VITAMIN D DEFICIENCY: ICD-10-CM

## 2021-08-13 DIAGNOSIS — M54.2 CERVICAL PAIN: ICD-10-CM

## 2021-08-13 DIAGNOSIS — I10 ESSENTIAL HYPERTENSION: ICD-10-CM

## 2021-08-13 DIAGNOSIS — Z11.59 NEED FOR HEPATITIS C SCREENING TEST: ICD-10-CM

## 2021-08-13 DIAGNOSIS — Z00.00 MEDICARE ANNUAL WELLNESS VISIT, SUBSEQUENT: Primary | ICD-10-CM

## 2021-08-13 DIAGNOSIS — M81.0 AGE-RELATED OSTEOPOROSIS WITHOUT CURRENT PATHOLOGICAL FRACTURE: ICD-10-CM

## 2021-08-13 PROCEDURE — G8536 NO DOC ELDER MAL SCRN: HCPCS | Performed by: INTERNAL MEDICINE

## 2021-08-13 PROCEDURE — 72040 X-RAY EXAM NECK SPINE 2-3 VW: CPT

## 2021-08-13 PROCEDURE — 1101F PT FALLS ASSESS-DOCD LE1/YR: CPT | Performed by: INTERNAL MEDICINE

## 2021-08-13 PROCEDURE — 99214 OFFICE O/P EST MOD 30 MIN: CPT | Performed by: INTERNAL MEDICINE

## 2021-08-13 PROCEDURE — G8419 CALC BMI OUT NRM PARAM NOF/U: HCPCS | Performed by: INTERNAL MEDICINE

## 2021-08-13 PROCEDURE — G8427 DOCREV CUR MEDS BY ELIG CLIN: HCPCS | Performed by: INTERNAL MEDICINE

## 2021-08-13 PROCEDURE — G8754 DIAS BP LESS 90: HCPCS | Performed by: INTERNAL MEDICINE

## 2021-08-13 PROCEDURE — G0439 PPPS, SUBSEQ VISIT: HCPCS | Performed by: INTERNAL MEDICINE

## 2021-08-13 PROCEDURE — G8752 SYS BP LESS 140: HCPCS | Performed by: INTERNAL MEDICINE

## 2021-08-13 PROCEDURE — 1090F PRES/ABSN URINE INCON ASSESS: CPT | Performed by: INTERNAL MEDICINE

## 2021-08-13 PROCEDURE — G8432 DEP SCR NOT DOC, RNG: HCPCS | Performed by: INTERNAL MEDICINE

## 2021-08-13 NOTE — PROGRESS NOTES
Chief Complaint   Patient presents with    Follow Up Chronic Condition     6 mo fu    Annual Wellness Visit       Depression Risk Factor Screening:     3 most recent PHQ Screens 2/9/2021   Little interest or pleasure in doing things Not at all   Feeling down, depressed, irritable, or hopeless Not at all   Total Score PHQ 2 0       Functional Ability and Level of Safety:     Activities of Daily Living  ADL Assessment 8/13/2021   Feeding yourself No Help Needed   Getting from bed to chair No Help Needed   Getting dressed No Help Needed   Bathing or showering No Help Needed   Walk across the room (includes cane/walker) No Help Needed   Using the telphone No Help Needed   Taking your medications No Help Needed   Preparing meals No Help Needed   Managing money (expenses/bills) No Help Needed   Moderately strenuous housework (laundry) No Help Needed   Shopping for personal items (toiletries/medicines) No Help Needed   Shopping for groceries No Help Needed   Driving No Help Needed   Climbing a flight of stairs No Help Needed   Getting to places beyond walking distances No Help Needed       Fall Risk  Fall Risk Assessment, last 12 mths 2/9/2021   Able to walk? Yes   Fall in past 12 months? 0   Do you feel unsteady? 0   Are you worried about falling 0       Abuse Screen  Abuse Screening Questionnaire 8/13/2021   Do you ever feel afraid of your partner? N   Are you in a relationship with someone who physically or mentally threatens you? N   Is it safe for you to go home?  Y         Patient Care Team   Patient Care Team:  Andrei Akers MD as PCP - General (Internal Medicine)  [facility]  [facility]  Diego Sandra MD (Ophthalmology)

## 2021-08-13 NOTE — PROGRESS NOTES
Gayle Leblanc is a 67 y.o. female and presents with Follow Up Chronic Condition (6 mo fu) and Annual Wellness Visit  . Subjective:  Mrs. Rachael Garvin is a 58-year-old female who presents to the office today for Medicare wellness check and also complains of cervical neck pain and is also here for follow-up of multiple medical problems. Patient complains today of cervical neck pain. This is been ongoing for approximately 1 year and is mostly right-sided. She is taken anti-inflammatory medication applied heat has done range of motion exercises. She has noted a reduction in the range of motion of her neck especially to the right. She denies radicular pain to the arms. The patient does have a history of lumbar spinal stenosis and generalized DJD. The patient has hypertension currently managed on hydrochlorothiazide, amlodipine, and benazepril. The patient tolerates this regimen without orthostatic dizziness, lower extremity edema, cough or rash. Denies headaches, numbness, tingling or focal neurological problems. She is on PPI therapy for GERD. She denies breakthrough heartburn and has had no dysphagia, early satiety or unexplained weight loss. Patient also has osteoporosis with vitamin D deficiency. She remains on Prolia injections. She is due for bone density testing. She denies any side effects related to her medication and has had no falls or fractures.     Past Medical History:   Diagnosis Date    Allergic rhinitis 8/21/2017    Breast cancer ([facility] [address] 2009    Right breast     Diverticulosis of large intestine without hemorrhage 8/21/2017    DJD (degenerative joint disease) 8/21/2017    GERD (gastroesophageal reflux disease) 8/21/2017    Hypertension 8/21/2017    Internal hemorrhoid 8/21/2017    Lumbar spinal stenosis 9/20/2017    Osteopenia 8/21/2017    S/P radiation therapy 2009    Swelling of thumb, right 8/21/2017    TMJ dysfunction 4/5/2018    Vitamin D deficiency 8/21/2017 Past Surgical History:   Procedure Laterality Date    HX BREAST BIOPSY Right 2008    positive ultrasound core bx    HX BREAST BIOPSY Bilateral 2008    benign mri bx    HX BREAST LUMPECTOMY Right 2009    x2    HX CATARACT REMOVAL Bilateral     HX COLONOSCOPY      HX DILATION AND CURETTAGE      HX TONSILLECTOMY       Allergies   Allergen Reactions    Sulfa (Sulfonamide Antibiotics) Unknown (comments)     Current Outpatient Medications   Medication Sig Dispense Refill    zinc 50 mg tab tablet Take  by mouth daily.  hydroCHLOROthiazide (HYDRODIURIL) 25 mg tablet Take 1 Tab by mouth daily. 90 Tab 2    amLODIPine (NORVASC) 5 mg tablet Take 1 Tab by mouth daily. 90 Tab 2    benazepriL (LOTENSIN) 20 mg tablet Take 1 Tab by mouth daily. 90 Tab 2    omeprazole (PRILOSEC) 10 mg capsule Take 10 mg by mouth daily.  naproxen sod/diphenhydramine (ALEVE PM PO) Take  by mouth nightly as needed.  denosumab (PROLIA) 60 mg/mL injection 60 mg by SubCUTAneous route every 6 months.  MULTIVITAMIN (MULTIPLE VITAMIN PO) Take  by mouth. Social History     Socioeconomic History    Marital status:      Spouse name: Not on file    Number of children: 3    Years of education: Not on file    Highest education level: Not on file   Tobacco Use    Smoking status: Never Smoker    Smokeless tobacco: Never Used   Vaping Use    Vaping Use: Never used   Substance and Sexual Activity    Alcohol use: No     Social Determinants of Health     Financial Resource Strain:     Difficulty of Paying Living Expenses:    Food Insecurity:     Worried About Running Out of Food in the Last Year:     Ran Out of Food in the Last Year:    Transportation Needs:     Lack of Transportation (Medical):      Lack of Transportation (Non-Medical):    Physical Activity:     Days of Exercise per Week:     Minutes of Exercise per Session:    Stress:     Feeling of Stress :    Social Connections:     Frequency of Communication with Friends and Family:     Frequency of Social Gatherings with Friends and Family:     Attends Mosque Services:     Active Member of Clubs or Organizations:     Attends Club or Organization Meetings:     Marital Status:      Family History   Problem Relation Age of Onset    Breast Cancer Sister 61    COPD Mother     Stroke Father        Health Maintenance   Topic Date Due    Hepatitis C Screening  Never done    DTaP/Tdap/Td series (1 - Tdap) Never done    Shingrix Vaccine Age 50> (1 of 2) Never done    Flu Vaccine (1) 09/01/2021    Medicare Yearly Exam  08/14/2022    Bone Densitometry (Dexa) Screening  Completed    COVID-19 Vaccine  Completed    Pneumococcal 65+ years  Completed        Review of Systems  Constitutional: negative for fevers, chills, anorexia and weight loss  Eyes:   negative for visual disturbance and irritation  ENT:   negative for tinnitus,sore throat,nasal congestion,ear pain,hoarseness  Respiratory:  negative for cough, hemoptysis, dyspnea,wheezing  CV:   negative for chest pain, palpitations, lower extremity edema  GI:   negative for nausea, vomiting, diarrhea, abdominal pain,melena  Endo:               negative for polyuria,polydipsia,polyphagia,heat intolerance  Genitourinary: negative for frequency, dysuria and hematuria  Integumentary: negative for rash and pruritus  Hematologic:  negative for easy bruising and gum/nose bleeding  Musculoskel: Positive for chronic cervical pain especially on the right with reduction of range of motion of neck  Neurological:  negative for headaches, dizziness, vertigo, memory problems and gait   Behavl/Psych: negative for feelings of anxiety, depression, mood changes  ROS otherwise negative      Objective:  Visit Vitals  /70 (BP 1 Location: Left upper arm, BP Patient Position: Sitting, BP Cuff Size: Adult)   Pulse 83   Temp 98.1 °F (36.7 °C) (Oral)   Resp 16   Ht 5' 4.5\" (1.638 m)   Wt 149 lb (67.6 kg)   LMP  (LMP Unknown)   SpO2 97%   BMI 25.18 kg/m²     Body mass index is 25.18 kg/m². Physical Exam:   General appearance - alert, well appearing, and in no distress  Mental status - alert, oriented to person, place, and time  EYE-LOS, EOMI,conjunctiva normal bilaterally, lids normal  ENT-ENT exam normal, no neck nodes or sinus tenderness  Nose - normal and patent, no erythema,  Or discharge   Mouth - mucous membranes moist, pharynx normal without lesions  Neck - supple. There is no pain with palpation over the neck muscles on the right and left cervical region. Range of motion of the neck is limited in all directions but especially to the right. Chest - clear to auscultation, no wheezes, rales or rhonchi. Heart - normal rate, regular rhythm, normal S1, S2, no murmurs, rubs, clicks or gallops   Abdomen - soft, nontender, nondistended, no masses or organomegaly  Lymph- no adenopathy palpable  Ext-peripheral pulses normal, no pedal edema, no clubbing or cyanosis  Skin-Warm and dry. no hyperpigmentation, vitiligo, or suspicious lesions  Neuro -alert, oriented, normal speech, no focal findings or movement disorder noted    In addition this patient is seen for AWV  as detailed below: This is a Subsequent Medicare Annual Wellness Exam (AWV) (Performed 12 months after IPPE or effective date of Medicare Part B enrollment)    I have reviewed the patient's medical history in detail and updated the computerized patient record. Problem list reviewed with patient and risk factors discussed. PSH, SH, FH, Medications and HM issues also reviewed and discussed. Depression screen, fall risk assessment, functional abilities and ACP also reviewed and discussed as above and below.     Depression Risk Factor Screening:     3 most recent PHQ Screens 2/9/2021   Little interest or pleasure in doing things Not at all   Feeling down, depressed, irritable, or hopeless Not at all   Total Score PHQ 2 0     Alcohol Risk Factor Screening: Do you average more than 1 drink per night or more than 7 drinks a week:  No    On any one occasion in the past three months have you have had more than 3 drinks containing alcohol:  No    Functional Ability and Level of Safety:   Hearing Loss  Hearing is good. Activities of Daily Living  The home contains: handrails and grab bars  Patient does total self care    Fall Risk  Fall Risk Assessment, last 12 mths 2/9/2021   Able to walk? Yes   Fall in past 12 months? 0   Do you feel unsteady? 0   Are you worried about falling 0       Abuse Screen  Patient is not abused    Cognitive Screening   Evaluation of Cognitive Function:  Has your family/caregiver stated any concerns about your memory: no  Normal    Patient Care Team   Patient Care Team:  Juan M Velazquez MD as PCP - General (Internal Medicine)  Juan M Velazquez MD as PCP - [facility]  Melquiades Paiz MD (Ophthalmology)    Assessment/Plan   Education and counseling provided:  Are appropriate based on today's review and evaluation    Assessment/Plan:   Impressions:      ICD-10-CM ICD-9-CM    1. Medicare annual wellness visit, subsequent  Z00.00 V70.0    2. Essential hypertension  I10 401.9 COLLECTION VENOUS BLOOD,VENIPUNCTURE      CBC WITH AUTOMATED DIFF      LIPID PANEL      METABOLIC PANEL, COMPREHENSIVE      TSH 3RD GENERATION      URINALYSIS W/ REFLEX CULTURE   3. Primary osteoarthritis involving multiple joints  M89.49 715.98    4. Gastroesophageal reflux disease without esophagitis  K21.9 530.81    5. Age-related osteoporosis without current pathological fracture  M81.0 733.01    6. Vitamin D deficiency  E55.9 268.9 VITAMIN D, 25 HYDROXY   7. Cervical pain  M54.2 723.1 XR SPINE CERV PA LAT ODONT 3 V MAX   8. Need for hepatitis C screening test  Z11.59 V73.89 HEPATITIS C AB        Plan:  1. Continue present meds  2.  Lifestyle modifications including Na restriction, low carb/fat diet, weight reduction and exercise (at least a walking program). Follow-up and Dispositions    · Return in about 6 months (around 2/13/2022). Orders Placed This Encounter    XR SPINE CERV PA LAT ODONT 3 V MAX     Standing Status:   Future     Standing Expiration Date:   8/14/2022     Order Specific Question:   Reason for Exam     Answer:   neck pain     Order Specific Question:   Which facility to perform procedure? Answer:   [facility]    CBC WITH AUTOMATED DIFF     Standing Status:   Future     Standing Expiration Date:   8/13/2022    LIPID PANEL     Standing Status:   Future     Standing Expiration Date:   8/25/6333    METABOLIC PANEL, COMPREHENSIVE     Standing Status:   Future     Standing Expiration Date:   8/13/2022    TSH 3RD GENERATION     Standing Status:   Future     Standing Expiration Date:   8/13/2022    URINALYSIS W/ REFLEX CULTURE     Standing Status:   Future     Standing Expiration Date:   8/13/2022    VITAMIN D, 25 HYDROXY     Standing Status:   Future     Standing Expiration Date:   8/13/2022    HEPATITIS C AB     Standing Status:   Future     Standing Expiration Date:   8/13/2022    COLLECTION VENOUS BLOOD,VENIPUNCTURE       Jennie Mariano MD   Assessment/Plan:  Diagnoses and all orders for this visit:    Medicare annual wellness visit, subsequent    Essential hypertension  -     COLLECTION VENOUS BLOOD,VENIPUNCTURE  -     CBC WITH AUTOMATED DIFF; Future  -     LIPID PANEL; Future  -     METABOLIC PANEL, COMPREHENSIVE; Future  -     TSH 3RD GENERATION; Future  -     URINALYSIS W/ REFLEX CULTURE; Future    Primary osteoarthritis involving multiple joints    Gastroesophageal reflux disease without esophagitis    Age-related osteoporosis without current pathological fracture    Vitamin D deficiency  -     VITAMIN D, 25 HYDROXY; Future    Cervical pain  -     XR SPINE CERV PA LAT ODONT 3 V MAX; Future    Need for hepatitis C screening test  -     HEPATITIS C AB;  Future        Health Maintenance Due   Topic Date Due    Hepatitis C Screening  Never done    DTaP/Tdap/Td series (1 - Tdap) Never done    Shingrix Vaccine Age 50> (1 of 2) Never done       Other instructions: The patient's medications were reviewed and reconciled. No change in her current medical regimen will be made. A no added salt, prudent diet is encouraged    Patient continues to have cervical neck pain which is worsened over the last year and is associated with reduction in the range of motion of her neck. She has had no improvement with range of motion exercises, heat and NSAID. We will obtain an x-ray of the neck but she likely will need an MRI. 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 Await results of multiple labs    Follow-up in 6 months. As noted she may need further work-up for management of her neck pain. Follow-up and Dispositions    · Return in about 6 months (around 2/13/2022). I have reviewed with the patient details of the assessment and plan and all questions were answered. Relevent patient education was performed. The most recent lab findings were reviewed with the patient. An After Visit Summary was printed and given to the patient. Sonja Mcfarlane MD    Please note that this dictation was completed with [employer], the computer voice recognition software. Quite often unanticipated grammatical, syntax, homophones, and other interpretive errors are inadvertently transcribed by the computer software. Please disregard these errors. Please excuse any errors that have escaped final proofreading.

## 2021-08-13 NOTE — PATIENT INSTRUCTIONS
The best way to stay healthy is to live a healthy lifestyle. A healthy lifestyle includes regular exercise, eating a well-balanced diet, keeping a healthy weight and not smoking. Regular physical exams and screening tests are another important way to take care of yourself. Preventive exams provided by health care providers can find health problems early when treatment works best and can keep you from getting certain diseases or illnesses. Preventive services include exams, lab tests, screenings, shots, monitoring and information to help you take care of your own health. All people over 65 should have a pneumonia shot. Pneumonia shots are usually only needed once in a lifetime unless your doctor decides differently. In addition to your physical exam, some screening tests are recommended:    All people over 65 should have a yearly flu shot. People over 65 are at medium to high risk for Hepatitis B. Three shots are needed for complete protection. Bone mass measurement (dexa scan) is recommended every two years. Diabetes Mellitus screening is recommended every year. Glaucoma is an eye disease caused by high pressure in the eye. An eye exam is recommended every year. Cardiovascular screening tests that check your cholesterol and other blood fat (lipid) levels are recommended every five years. Colorectal Cancer screening tests help to find pre-cancerous polyps (growths in the colon) so they can be removed before they turn into cancer. Tests ordered for screening depend on your personal and family history risk factors. Prostate Cancer Screening (annually up to age 76)    Screening for breast cancer is recommended yearly with a Mammogram.    Screening for cervical and vaginal cancer is recommended with a pelvic and Pap test every two years.  However if you have had an abnormal pap in the past  three years or at high risk for cervical or vaginal cancer Medicare will cover a pap test and a pelvic exam every year. Here is a list of your current Health Maintenance items with a due date:  Health Maintenance Due   Topic Date Due    Hepatitis C Test  Never done    DTaP/Tdap/Td  (1 - Tdap) Never done    Shingles Vaccine (1 of 2) Never done    Annual Well Visit  02/12/2021          DASH Diet: Care Instructions  Your Care Instructions     The DASH diet is an eating plan that can help lower your blood pressure. DASH stands for Dietary Approaches to Stop Hypertension. Hypertension is high blood pressure. The DASH diet focuses on eating foods that are high in calcium, potassium, and magnesium. These nutrients can lower blood pressure. The foods that are highest in these nutrients are fruits, vegetables, low-fat dairy products, nuts, seeds, and legumes. But taking calcium, potassium, and magnesium supplements instead of eating foods that are high in those nutrients does not have the same effect. The DASH diet also includes whole grains, fish, and poultry. The DASH diet is one of several lifestyle changes your doctor may recommend to lower your high blood pressure. Your doctor may also want you to decrease the amount of sodium in your diet. Lowering sodium while following the DASH diet can lower blood pressure even further than just the DASH diet alone. Follow-up care is a key part of your treatment and safety. Be sure to make and go to all appointments, and call your doctor if you are having problems. It's also a good idea to know your test results and keep a list of the medicines you take. How can you care for yourself at home? Following the DASH diet  · Eat 4 to 5 servings of fruit each day. A serving is 1 medium-sized piece of fruit, ½ cup chopped or canned fruit, 1/4 cup dried fruit, or 4 ounces (½ cup) of fruit juice. Choose fruit more often than fruit juice. · Eat 4 to 5 servings of vegetables each day.  A serving is 1 cup of lettuce or raw leafy vegetables, ½ cup of chopped or cooked vegetables, or 4 ounces (½ cup) of vegetable juice. Choose vegetables more often than vegetable juice. · Get 2 to 3 servings of low-fat and fat-free dairy each day. A serving is 8 ounces of milk, 1 cup of yogurt, or 1 ½ ounces of cheese. · Eat 6 to 8 servings of grains each day. A serving is 1 slice of bread, 1 ounce of dry cereal, or ½ cup of cooked rice, pasta, or cooked cereal. Try to choose whole-grain products as much as possible. · Limit lean meat, poultry, and fish to 2 servings each day. A serving is 3 ounces, about the size of a deck of cards. · Eat 4 to 5 servings of nuts, seeds, and legumes (cooked dried beans, lentils, and split peas) each week. A serving is 1/3 cup of nuts, 2 tablespoons of seeds, or ½ cup of cooked beans or peas. · Limit fats and oils to 2 to 3 servings each day. A serving is 1 teaspoon of vegetable oil or 2 tablespoons of salad dressing. · Limit sweets and added sugars to 5 servings or less a week. A serving is 1 tablespoon jelly or jam, ½ cup sorbet, or 1 cup of lemonade. · Eat less than 2,300 milligrams (mg) of sodium a day. If you limit your sodium to 1,500 mg a day, you can lower your blood pressure even more. · Be aware that all of these are the suggested number of servings for people who eat 1,800 to 2,000 calories a day. Your recommended number of servings may be different if you need more or fewer calories. Tips for success  · Start small. Do not try to make dramatic changes to your diet all at once. You might feel that you are missing out on your favorite foods and then be more likely to not follow the plan. Make small changes, and stick with them. Once those changes become habit, add a few more changes. · Try some of the following:  ? Make it a goal to eat a fruit or vegetable at every meal and at snacks. This will make it easy to get the recommended amount of fruits and vegetables each day.   ? Try yogurt topped with fruit and nuts for a snack or healthy dessert. ? Add lettuce, tomato, cucumber, and onion to sandwiches. ? Combine a ready-made pizza crust with low-fat mozzarella cheese and lots of vegetable toppings. Try using tomatoes, squash, spinach, broccoli, carrots, cauliflower, and onions. ? Have a variety of cut-up vegetables with a low-fat dip as an appetizer instead of chips and dip. ? Sprinkle sunflower seeds or chopped almonds over salads. Or try adding chopped walnuts or almonds to cooked vegetables. ? Try some vegetarian meals using beans and peas. Add garbanzo or kidney beans to salads. Make burritos and tacos with mashed tinsley beans or black beans. Where can you learn more? Go to http://www.house.com/  Enter H967 in the search box to learn more about \"DASH Diet: Care Instructions. \"  Current as of: August 31, 2020               Content Version: 12.8  © 2006-2021 [employer]. Care instructions adapted under license by [employer] (which disclaims liability or warranty for this information). If you have questions about a medical condition or this instruction, always ask your healthcare professional. [address] any warranty or liability for your use of this information.

## 2021-08-14 LAB
25(OH)D3 SERPL-MCNC: 34.7 NG/ML (ref 30–100)
ALBUMIN SERPL-MCNC: 4.2 G/DL (ref 3.5–5)
ALBUMIN/GLOB SERPL: 1.3 {RATIO} (ref 1.1–2.2)
ALP SERPL-CCNC: 72 U/L (ref 45–117)
ALT SERPL-CCNC: 49 U/L (ref 12–78)
ANION GAP SERPL CALC-SCNC: 4 MMOL/L (ref 5–15)
APPEARANCE UR: CLEAR
AST SERPL-CCNC: 28 U/L (ref 15–37)
BACTERIA URNS QL MICRO: NEGATIVE /HPF
BASOPHILS # BLD: 0.1 K/UL (ref 0–0.1)
BASOPHILS NFR BLD: 1 % (ref 0–1)
BILIRUB SERPL-MCNC: 0.6 MG/DL (ref 0.2–1)
BILIRUB UR QL: NEGATIVE
BUN SERPL-MCNC: 16 MG/DL (ref 6–20)
BUN/CREAT SERPL: 25 (ref 12–20)
CALCIUM SERPL-MCNC: 9.8 MG/DL (ref 8.5–10.1)
CHLORIDE SERPL-SCNC: 103 MMOL/L (ref 97–108)
CHOLEST SERPL-MCNC: 191 MG/DL
CO2 SERPL-SCNC: 30 MMOL/L (ref 21–32)
COLOR UR: NORMAL
CREAT SERPL-MCNC: 0.65 MG/DL (ref 0.55–1.02)
DIFFERENTIAL METHOD BLD: NORMAL
EOSINOPHIL # BLD: 0.2 K/UL (ref 0–0.4)
EOSINOPHIL NFR BLD: 3 % (ref 0–7)
EPITH CASTS URNS QL MICRO: NORMAL /LPF
ERYTHROCYTE [DISTWIDTH] IN BLOOD BY AUTOMATED COUNT: 12.7 % (ref 11.5–14.5)
GLOBULIN SER CALC-MCNC: 3.3 G/DL (ref 2–4)
GLUCOSE SERPL-MCNC: 91 MG/DL (ref 65–100)
GLUCOSE UR STRIP.AUTO-MCNC: NEGATIVE MG/DL
HCT VFR BLD AUTO: 44.5 % (ref 35–47)
HCV AB SERPL QL IA: NONREACTIVE
HDLC SERPL-MCNC: 67 MG/DL
HDLC SERPL: 2.9 {RATIO} (ref 0–5)
HGB BLD-MCNC: 14.6 G/DL (ref 11.5–16)
HGB UR QL STRIP: NEGATIVE
IMM GRANULOCYTES # BLD AUTO: 0 K/UL (ref 0–0.04)
IMM GRANULOCYTES NFR BLD AUTO: 0 % (ref 0–0.5)
KETONES UR QL STRIP.AUTO: NEGATIVE MG/DL
LDLC SERPL CALC-MCNC: 109.6 MG/DL (ref 0–100)
LEUKOCYTE ESTERASE UR QL STRIP.AUTO: NEGATIVE
LYMPHOCYTES # BLD: 1.5 K/UL (ref 0.8–3.5)
LYMPHOCYTES NFR BLD: 22 % (ref 12–49)
MCH RBC QN AUTO: 30.9 PG (ref 26–34)
MCHC RBC AUTO-ENTMCNC: 32.8 G/DL (ref 30–36.5)
MCV RBC AUTO: 94.3 FL (ref 80–99)
MONOCYTES # BLD: 0.6 K/UL (ref 0–1)
MONOCYTES NFR BLD: 8 % (ref 5–13)
NEUTS SEG # BLD: 4.6 K/UL (ref 1.8–8)
NEUTS SEG NFR BLD: 66 % (ref 32–75)
NITRITE UR QL STRIP.AUTO: NEGATIVE
NRBC # BLD: 0 K/UL (ref 0–0.01)
NRBC BLD-RTO: 0 PER 100 WBC
PH UR STRIP: 6 [PH] (ref 5–8)
PLATELET # BLD AUTO: 303 K/UL (ref 150–400)
PMV BLD AUTO: 11.2 FL (ref 8.9–12.9)
POTASSIUM SERPL-SCNC: 4.8 MMOL/L (ref 3.5–5.1)
PROT SERPL-MCNC: 7.5 G/DL (ref 6.4–8.2)
PROT UR STRIP-MCNC: NEGATIVE MG/DL
RBC # BLD AUTO: 4.72 M/UL (ref 3.8–5.2)
RBC #/AREA URNS HPF: NORMAL /HPF (ref 0–5)
SODIUM SERPL-SCNC: 137 MMOL/L (ref 136–145)
SP GR UR REFRACTOMETRY: 1.01 (ref 1–1.03)
TRIGL SERPL-MCNC: 72 MG/DL (ref ?–150)
TSH SERPL DL<=0.05 MIU/L-ACNC: 2.1 UIU/ML (ref 0.36–3.74)
UA: UC IF INDICATED,UAUC: NORMAL
UROBILINOGEN UR QL STRIP.AUTO: 0.2 EU/DL (ref 0.2–1)
VLDLC SERPL CALC-MCNC: 14.4 MG/DL
WBC # BLD AUTO: 7 K/UL (ref 3.6–11)
WBC URNS QL MICRO: NORMAL /HPF (ref 0–4)

## 2021-08-14 NOTE — PROGRESS NOTES
Please notify patient. Labs stable.    No change in current management/meds    Cervical spine film showed DDD and arthritis - would recommend MRI

## 2021-08-16 ENCOUNTER — TELEPHONE (OUTPATIENT)
Dept: INTERNAL MEDICINE CLINIC | Age: 76
End: 2021-08-16

## 2021-08-16 DIAGNOSIS — M50.30 DDD (DEGENERATIVE DISC DISEASE), CERVICAL: Primary | ICD-10-CM

## 2021-08-16 NOTE — TELEPHONE ENCOUNTER
Detail Level: Simple Patient advised of results- she is agreeable to having an MRI scheduled Additional Notes: Patient consent was obtained to proceed with the visit and recommended plan of care after discussion of all risks and benefits, including the risks of COVID-19 exposure.

## 2021-08-16 NOTE — TELEPHONE ENCOUNTER
----- Message from Quirino Golden MD sent at 8/14/2021  8:49 AM EDT -----  Please notify patient. Labs stable.    No change in current management/meds    Cervical spine film showed DDD and arthritis - would recommend MRI

## 2021-08-17 ENCOUNTER — TRANSCRIBE ORDER (OUTPATIENT)
Dept: SCHEDULING | Age: 76
End: 2021-08-17

## 2021-08-17 DIAGNOSIS — Z12.31 VISIT FOR SCREENING MAMMOGRAM: ICD-10-CM

## 2021-08-17 DIAGNOSIS — Z85.3 PERSONAL HISTORY OF MALIGNANT NEOPLASM OF BREAST: Primary | ICD-10-CM

## 2021-08-18 ENCOUNTER — HOSPITAL ENCOUNTER (OUTPATIENT)
Dept: MRI IMAGING | Age: 76
Discharge: HOME | End: 2021-08-18
Attending: INTERNAL MEDICINE
Payer: MEDICARE

## 2021-08-18 ENCOUNTER — TELEPHONE (OUTPATIENT)
Dept: INTERNAL MEDICINE CLINIC | Age: 76
End: 2021-08-18

## 2021-08-18 DIAGNOSIS — M50.30 DDD (DEGENERATIVE DISC DISEASE), CERVICAL: ICD-10-CM

## 2021-08-18 DIAGNOSIS — M54.2 CERVICAL PAIN: Primary | ICD-10-CM

## 2021-08-18 PROCEDURE — 72141 MRI NECK SPINE W/O DYE: CPT

## 2021-08-18 NOTE — PROGRESS NOTES
MRI shows degenerative disc ,disease slippage of one vertebrae on another, multilevel degenerative arthritis and a ruptured disc at C4-5. Needs to see cervical spine specialist

## 2021-08-18 NOTE — TELEPHONE ENCOUNTER
----- Message from Eran Easton MD sent at 8/18/2021  2:28 PM EDT -----  MRI shows degenerative disc ,disease slippage of one vertebrae on another, multilevel degenerative arthritis and a ruptured disc at C4-5. Needs to see cervical spine specialist

## 2021-08-18 NOTE — TELEPHONE ENCOUNTER
Patient advised of results and she is agreeable to being referred to a specialist. She has no preference.

## 2021-11-15 DIAGNOSIS — I10 ESSENTIAL HYPERTENSION: ICD-10-CM

## 2021-11-15 RX ORDER — HYDROCHLOROTHIAZIDE 25 MG/1
TABLET ORAL
Qty: 90 TABLET | Refills: 0 | Status: SHIPPED | OUTPATIENT
Start: 2021-11-15 | End: 2022-02-15 | Stop reason: SDUPTHER

## 2021-11-15 RX ORDER — BENAZEPRIL HYDROCHLORIDE 20 MG/1
TABLET ORAL
Qty: 90 TABLET | Refills: 0 | Status: SHIPPED | OUTPATIENT
Start: 2021-11-15 | End: 2022-02-15 | Stop reason: SDUPTHER

## 2021-11-15 RX ORDER — AMLODIPINE BESYLATE 5 MG/1
TABLET ORAL
Qty: 90 TABLET | Refills: 0 | Status: SHIPPED | OUTPATIENT
Start: 2021-11-15 | End: 2022-02-15 | Stop reason: SDUPTHER

## 2022-02-15 ENCOUNTER — OFFICE VISIT (OUTPATIENT)
Dept: INTERNAL MEDICINE CLINIC | Age: 77
End: 2022-02-15
Payer: MEDICARE

## 2022-02-15 VITALS
RESPIRATION RATE: 18 BRPM | DIASTOLIC BLOOD PRESSURE: 82 MMHG | HEIGHT: 65 IN | TEMPERATURE: 98.4 F | OXYGEN SATURATION: 98 % | BODY MASS INDEX: 25.66 KG/M2 | WEIGHT: 154 LBS | HEART RATE: 74 BPM | SYSTOLIC BLOOD PRESSURE: 130 MMHG

## 2022-02-15 DIAGNOSIS — M48.02 CERVICAL SPINAL STENOSIS: Chronic | ICD-10-CM

## 2022-02-15 DIAGNOSIS — I10 ESSENTIAL HYPERTENSION: ICD-10-CM

## 2022-02-15 DIAGNOSIS — M81.0 AGE-RELATED OSTEOPOROSIS WITHOUT CURRENT PATHOLOGICAL FRACTURE: Chronic | ICD-10-CM

## 2022-02-15 DIAGNOSIS — K21.9 GASTROESOPHAGEAL REFLUX DISEASE WITHOUT ESOPHAGITIS: Chronic | ICD-10-CM

## 2022-02-15 DIAGNOSIS — I10 PRIMARY HYPERTENSION: Primary | Chronic | ICD-10-CM

## 2022-02-15 DIAGNOSIS — E55.9 VITAMIN D DEFICIENCY: Chronic | ICD-10-CM

## 2022-02-15 DIAGNOSIS — I49.9 IRREGULAR HEART BEAT: Chronic | ICD-10-CM

## 2022-02-15 PROCEDURE — 1101F PT FALLS ASSESS-DOCD LE1/YR: CPT | Performed by: INTERNAL MEDICINE

## 2022-02-15 PROCEDURE — G8754 DIAS BP LESS 90: HCPCS | Performed by: INTERNAL MEDICINE

## 2022-02-15 PROCEDURE — G8419 CALC BMI OUT NRM PARAM NOF/U: HCPCS | Performed by: INTERNAL MEDICINE

## 2022-02-15 PROCEDURE — 99214 OFFICE O/P EST MOD 30 MIN: CPT | Performed by: INTERNAL MEDICINE

## 2022-02-15 PROCEDURE — G8427 DOCREV CUR MEDS BY ELIG CLIN: HCPCS | Performed by: INTERNAL MEDICINE

## 2022-02-15 PROCEDURE — G8536 NO DOC ELDER MAL SCRN: HCPCS | Performed by: INTERNAL MEDICINE

## 2022-02-15 PROCEDURE — G8510 SCR DEP NEG, NO PLAN REQD: HCPCS | Performed by: INTERNAL MEDICINE

## 2022-02-15 PROCEDURE — 1090F PRES/ABSN URINE INCON ASSESS: CPT | Performed by: INTERNAL MEDICINE

## 2022-02-15 PROCEDURE — G8752 SYS BP LESS 140: HCPCS | Performed by: INTERNAL MEDICINE

## 2022-02-15 RX ORDER — BENAZEPRIL HYDROCHLORIDE 20 MG/1
20 TABLET ORAL DAILY
Qty: 90 TABLET | Refills: 0 | Status: SHIPPED | OUTPATIENT
Start: 2022-02-15 | End: 2022-05-24 | Stop reason: SDUPTHER

## 2022-02-15 RX ORDER — AMLODIPINE BESYLATE 5 MG/1
5 TABLET ORAL DAILY
Qty: 90 TABLET | Refills: 0 | Status: SHIPPED | OUTPATIENT
Start: 2022-02-15 | End: 2022-08-25

## 2022-02-15 RX ORDER — HYDROCHLOROTHIAZIDE 25 MG/1
25 TABLET ORAL DAILY
Qty: 90 TABLET | Refills: 0 | Status: SHIPPED | OUTPATIENT
Start: 2022-02-15 | End: 2022-05-23 | Stop reason: SDUPTHER

## 2022-02-15 NOTE — PROGRESS NOTES
Jean Claude Pandya is a 67 y.o. female presenting for Follow Up Chronic Condition (6 mo fu)  . 1. Have you been to the ER, urgent care clinic since your last visit? Hospitalized since your last visit? No    2. Have you seen or consulted any other health care providers outside of the [address] [place] [address] since your last visit? Include any pap smears or colon screening. Dr Julio Gerber, last 12 mths 2/15/2022   Able to walk? Yes   Fall in past 12 months? 0   Do you feel unsteady? 0   Are you worried about falling 0         Abuse Screening Questionnaire 8/13/2021   Do you ever feel afraid of your partner? N   Are you in a relationship with someone who physically or mentally threatens you? N   Is it safe for you to go home? Y       3 most recent PHQ Screens 2/15/2022   Little interest or pleasure in doing things Not at all   Feeling down, depressed, irritable, or hopeless Not at all   Total Score PHQ 2 0       There are no discontinued medications.

## 2022-02-15 NOTE — PROGRESS NOTES
Subjective:     Sarah Carrillo returns to the office today in follow-up of multiple medical problems. The patient has hypertension currently on 3 drug therapy. He tolerates this without orthostatic dizziness. She has had no headaches, numbness, tingling or focal neurological problems. GERD is currently being managed on medication. She denies any breakthrough heartburn and has had no dysphagia, early satiety or unexplained weight loss. She has osteoporosis and vitamin D deficiency. She is only taking a multivitamin for her supplementation. She does receive Prolia injections on a 6-month basis. She is due for bone density testing. There have been no falls or fractures. She has a history of cervical spinal stenosis and has been seen by neurosurgery. She has undergone physical therapy and is doing better. She has had no radicular discomfort. She complains of palpitations. She notes some irregularity when she is taking her pulse. She never has chest pains, shortness of breath or syncope.     Past Medical History:   Diagnosis Date    Allergic rhinitis 8/21/2017    Breast cancer ([facility] [address] 2009    Right breast     Diverticulosis of large intestine without hemorrhage 8/21/2017    DJD (degenerative joint disease) 8/21/2017    GERD (gastroesophageal reflux disease) 8/21/2017    Hypertension 8/21/2017    Internal hemorrhoid 8/21/2017    Lumbar spinal stenosis 9/20/2017    Osteopenia 8/21/2017    S/P radiation therapy 2009    Swelling of thumb, right 8/21/2017    TMJ dysfunction 4/5/2018    Vitamin D deficiency 8/21/2017     Past Surgical History:   Procedure Laterality Date    HX BREAST BIOPSY Right 2008    positive ultrasound core bx    HX BREAST BIOPSY Bilateral 2008    benign mri bx    HX BREAST LUMPECTOMY Right 2009    x2    HX CATARACT REMOVAL Bilateral     HX COLONOSCOPY      HX DILATION AND CURETTAGE      HX TONSILLECTOMY       Allergies   Allergen Reactions    Sulfa (Sulfonamide Antibiotics) Unknown (comments)     Current Outpatient Medications   Medication Sig Dispense Refill    amLODIPine (NORVASC) 5 mg tablet Take 1 Tablet by mouth daily. 90 Tablet 0    benazepriL (LOTENSIN) 20 mg tablet Take 1 Tablet by mouth daily. 90 Tablet 0    hydroCHLOROthiazide (HYDRODIURIL) 25 mg tablet Take 1 Tablet by mouth daily. 90 Tablet 0    zinc 50 mg tab tablet Take  by mouth daily.  omeprazole (PRILOSEC) 10 mg capsule Take 10 mg by mouth daily.  naproxen sod/diphenhydramine (ALEVE PM PO) Take  by mouth nightly as needed.  denosumab (PROLIA) 60 mg/mL injection 60 mg by SubCUTAneous route every 6 months.  MULTIVITAMIN (MULTIPLE VITAMIN PO) Take  by mouth. Social History     Socioeconomic History    Marital status:     Number of children: 3   Tobacco Use    Smoking status: Never Smoker    Smokeless tobacco: Never Used   Vaping Use    Vaping Use: Never used   Substance and Sexual Activity    Alcohol use: No     Family History   Problem Relation Age of Onset    Breast Cancer Sister 61    COPD Mother     Stroke Father        Review of Systems:  GEN: no weight loss, weight gain, fatigue or night sweats  CV: Positive for occasional palpitations  Resp: no dyspnea on exertion, no cough  Abd: no nausea, vomiting or diarrhea  EXT: denies edema, claudication  Endocrine: no hair loss, excessive thirst or polyuria  Neurological ROS: no TIA or stroke symptoms  ROS otherwise negative      Objective:     Visit Vitals  /82 (BP 1 Location: Right upper arm, BP Patient Position: Sitting, BP Cuff Size: Adult)   Pulse 74   Temp 98.4 °F (36.9 °C) (Oral)   Resp 18   Ht 5' 4.5\" (1.638 m)   Wt 154 lb (69.9 kg)   LMP  (LMP Unknown)   SpO2 98%   BMI 26.03 kg/m²     Body mass index is 26.03 kg/m². General:   alert, cooperative and no distress   Eyes: conjunctivae/sclerae clear.  PERRL, EOM's intact   Mouth:  No oral lesions, no pharyngeal erythema, no exudates   Neck: Trachea midline, no thyromegaly, no bruits   Heart:  Rhythm slightly irregular with an occasional ectopic beat   Lungs: Clear to auscultation bilaterally, no increased work of breathing   Abdomen: Soft, nontender. Normal bowel sounds   Extremities: No edema or cyanosis   Neuro: ..alert, oriented x3,speech normal in context and clarity, cranial nerves II-XII intact,motor strength: full proximally and distally,gait: normal  reflexes: full and symmetric     Physical exam otherwise negative         Assessment/Plan:     Diagnoses and all orders for this visit:    Primary hypertension    Gastroesophageal reflux disease without esophagitis    Vitamin D deficiency    Cervical spinal stenosis    Age-related osteoporosis without current pathological fracture  -     DEXA BONE DENSITY STUDY AXIAL; Future    Essential hypertension  -     amLODIPine (NORVASC) 5 mg tablet; Take 1 Tablet by mouth daily. , Normal, Disp-90 Tablet, R-0  -     benazepriL (LOTENSIN) 20 mg tablet; Take 1 Tablet by mouth daily. , Normal, Disp-90 Tablet, R-0  -     hydroCHLOROthiazide (HYDRODIURIL) 25 mg tablet; Take 1 Tablet by mouth daily. , Normal, Disp-90 Tablet, R-0    Irregular heart beat  -     REFERRAL TO CARDIOLOGY        Other instructions: The patient's medications were reviewed and reconciled. No change in her current medical regimen will be made. A no added salt, prudent diet is encouraged    Bone density testing will be scheduled. She will continue her Prolia injections on an every 6-month basis. Arrange for Holter monitor in regards to her irregular heartbeat to rule out A. fib or other arrhythmia    Labs from 8/13 were reviewed with the patient    Follow-up in 6 months    Follow-up and Dispositions    · Return in about 6 months (around 8/15/2022). Medina Sanford MD    Please note that this dictation was completed with High[employer], the [employer] voice recognition software.   Quite often unanticipated grammatical, syntax, homophones, and other interpretive errors are inadvertently transcribed by the computer software. Please disregard these errors. Please excuse any errors that have escaped final proofreading.

## 2022-02-15 NOTE — PATIENT INSTRUCTIONS
DASH Diet: Care Instructions  Your Care Instructions     The DASH diet is an eating plan that can help lower your blood pressure. DASH stands for Dietary Approaches to Stop Hypertension. Hypertension is high blood pressure. The DASH diet focuses on eating foods that are high in calcium, potassium, and magnesium. These nutrients can lower blood pressure. The foods that are highest in these nutrients are fruits, vegetables, low-fat dairy products, nuts, seeds, and legumes. But taking calcium, potassium, and magnesium supplements instead of eating foods that are high in those nutrients does not have the same effect. The DASH diet also includes whole grains, fish, and poultry. The DASH diet is one of several lifestyle changes your doctor may recommend to lower your high blood pressure. Your doctor may also want you to decrease the amount of sodium in your diet. Lowering sodium while following the DASH diet can lower blood pressure even further than just the DASH diet alone. Follow-up care is a key part of your treatment and safety. Be sure to make and go to all appointments, and call your doctor if you are having problems. It's also a good idea to know your test results and keep a list of the medicines you take. How can you care for yourself at home? Following the DASH diet  · Eat 4 to 5 servings of fruit each day. A serving is 1 medium-sized piece of fruit, ½ cup chopped or canned fruit, 1/4 cup dried fruit, or 4 ounces (½ cup) of fruit juice. Choose fruit more often than fruit juice. · Eat 4 to 5 servings of vegetables each day. A serving is 1 cup of lettuce or raw leafy vegetables, ½ cup of chopped or cooked vegetables, or 4 ounces (½ cup) of vegetable juice. Choose vegetables more often than vegetable juice. · Get 2 to 3 servings of low-fat and fat-free dairy each day. A serving is 8 ounces of milk, 1 cup of yogurt, or 1 ½ ounces of cheese. · Eat 6 to 8 servings of grains each day.  A serving is 1 slice of bread, 1 ounce of dry cereal, or ½ cup of cooked rice, pasta, or cooked cereal. Try to choose whole-grain products as much as possible. · Limit lean meat, poultry, and fish to 2 servings each day. A serving is 3 ounces, about the size of a deck of cards. · Eat 4 to 5 servings of nuts, seeds, and legumes (cooked dried beans, lentils, and split peas) each week. A serving is 1/3 cup of nuts, 2 tablespoons of seeds, or ½ cup of cooked beans or peas. · Limit fats and oils to 2 to 3 servings each day. A serving is 1 teaspoon of vegetable oil or 2 tablespoons of salad dressing. · Limit sweets and added sugars to 5 servings or less a week. A serving is 1 tablespoon jelly or jam, ½ cup sorbet, or 1 cup of lemonade. · Eat less than 2,300 milligrams (mg) of sodium a day. If you limit your sodium to 1,500 mg a day, you can lower your blood pressure even more. · Be aware that all of these are the suggested number of servings for people who eat 1,800 to 2,000 calories a day. Your recommended number of servings may be different if you need more or fewer calories. Tips for success  · Start small. Do not try to make dramatic changes to your diet all at once. You might feel that you are missing out on your favorite foods and then be more likely to not follow the plan. Make small changes, and stick with them. Once those changes become habit, add a few more changes. · Try some of the following:  ? Make it a goal to eat a fruit or vegetable at every meal and at snacks. This will make it easy to get the recommended amount of fruits and vegetables each day. ? Try yogurt topped with fruit and nuts for a snack or healthy dessert. ? Add lettuce, tomato, cucumber, and onion to sandwiches. ? Combine a ready-made pizza crust with low-fat mozzarella cheese and lots of vegetable toppings. Try using tomatoes, squash, spinach, broccoli, carrots, cauliflower, and onions. ?  Have a variety of cut-up vegetables with a low-fat dip as an appetizer instead of chips and dip. ? Sprinkle sunflower seeds or chopped almonds over salads. Or try adding chopped walnuts or almonds to cooked vegetables. ? Try some vegetarian meals using beans and peas. Add garbanzo or kidney beans to salads. Make burritos and tacos with mashed tinsley beans or black beans. Where can you learn more? Go to http://www.house.com/  Enter H967 in the search box to learn more about \"DASH Diet: Care Instructions. \"  Current as of: April 29, 2021               Content Version: 13.0  © 0568-5833 [employer]. Care instructions adapted under license by [employer] (which disclaims liability or warranty for this information). If you have questions about a medical condition or this instruction, always ask your healthcare professional. [address] any warranty or liability for your use of this information.

## 2022-03-18 PROBLEM — K64.8 INTERNAL HEMORRHOID: Status: ACTIVE | Noted: 2017-08-21

## 2022-03-18 PROBLEM — M54.2 CERVICAL PAIN: Status: ACTIVE | Noted: 2021-08-13

## 2022-03-18 PROBLEM — M79.89 SWELLING OF THUMB, RIGHT: Status: ACTIVE | Noted: 2017-08-21

## 2022-03-18 PROBLEM — K21.9 GERD (GASTROESOPHAGEAL REFLUX DISEASE): Status: ACTIVE | Noted: 2017-08-21

## 2022-03-19 PROBLEM — M48.061 LUMBAR SPINAL STENOSIS: Status: ACTIVE | Noted: 2017-09-20

## 2022-03-19 PROBLEM — M19.90 DJD (DEGENERATIVE JOINT DISEASE): Status: ACTIVE | Noted: 2017-08-21

## 2022-03-19 PROBLEM — M26.609 TMJ DYSFUNCTION: Status: ACTIVE | Noted: 2018-04-05

## 2022-03-19 PROBLEM — I49.9 IRREGULAR HEART BEAT: Status: ACTIVE | Noted: 2022-02-15

## 2022-03-19 PROBLEM — I10 HYPERTENSION: Status: ACTIVE | Noted: 2017-08-21

## 2022-03-19 PROBLEM — J30.9 ALLERGIC RHINITIS: Status: ACTIVE | Noted: 2017-08-21

## 2022-03-19 PROBLEM — K57.30 DIVERTICULOSIS OF LARGE INTESTINE WITHOUT HEMORRHAGE: Status: ACTIVE | Noted: 2017-08-21

## 2022-03-19 PROBLEM — B02.9 HERPES ZOSTER WITHOUT COMPLICATION: Status: ACTIVE | Noted: 2020-08-18

## 2022-03-19 PROBLEM — M81.0 AGE-RELATED OSTEOPOROSIS WITHOUT CURRENT PATHOLOGICAL FRACTURE: Status: ACTIVE | Noted: 2017-08-21

## 2022-03-19 PROBLEM — E55.9 VITAMIN D DEFICIENCY: Status: ACTIVE | Noted: 2017-08-21

## 2022-03-20 PROBLEM — M48.02 CERVICAL SPINAL STENOSIS: Status: ACTIVE | Noted: 2022-02-15

## 2022-03-23 ENCOUNTER — TELEPHONE (OUTPATIENT)
Dept: INTERNAL MEDICINE CLINIC | Age: 77
End: 2022-03-23

## 2022-03-23 DIAGNOSIS — I49.3 SYMPTOMATIC PVCS: Primary | ICD-10-CM

## 2022-03-23 NOTE — TELEPHONE ENCOUNTER
Patient advised of holter results per Dr Azevedo Backbone- Holter showed PVC's, recommend cardiology eval for echo and possible medication treatment.     Please refer to cardiologist.

## 2022-05-23 DIAGNOSIS — I10 ESSENTIAL HYPERTENSION: ICD-10-CM

## 2022-05-23 RX ORDER — HYDROCHLOROTHIAZIDE 25 MG/1
25 TABLET ORAL DAILY
Qty: 90 TABLET | Refills: 0 | Status: SHIPPED | OUTPATIENT
Start: 2022-05-23 | End: 2022-08-25 | Stop reason: SDUPTHER

## 2022-05-23 NOTE — TELEPHONE ENCOUNTER
PCP: Laura Garcia MD    Last appt: 2/23/2022  Future Appointments   Date Time Provider Delia Manzo   6/20/2022 11:00 AM [facility]   8/25/2022  1:00 PM Mery Leung MD [facility]       Requested Prescriptions     Pending Prescriptions Disp Refills    hydroCHLOROthiazide (HYDRODIURIL) 25 mg tablet 90 Tablet 0     Sig: Take 1 Tablet by mouth daily.          Other Comments:

## 2022-05-24 DIAGNOSIS — I10 ESSENTIAL HYPERTENSION: ICD-10-CM

## 2022-05-24 NOTE — TELEPHONE ENCOUNTER
Last Refill: 2/15/22  Last Visit: 2/15/2022 Dr. Shaun Weeks  Next Visit: 8/25/22 Dr. Pierre Chowdhury    Requested Prescriptions     Pending Prescriptions Disp Refills    benazepriL (LOTENSIN) 20 mg tablet 90 Tablet 0     Sig: Take 1 Tablet by mouth daily.

## 2022-05-26 RX ORDER — BENAZEPRIL HYDROCHLORIDE 20 MG/1
20 TABLET ORAL DAILY
Qty: 90 TABLET | Refills: 0 | Status: SHIPPED | OUTPATIENT
Start: 2022-05-26 | End: 2022-08-25 | Stop reason: SDUPTHER

## 2022-06-20 ENCOUNTER — HOSPITAL ENCOUNTER (OUTPATIENT)
Dept: MAMMOGRAPHY | Age: 77
Discharge: HOME | End: 2022-06-20
Attending: INTERNAL MEDICINE
Payer: MEDICARE

## 2022-06-20 DIAGNOSIS — Z12.31 VISIT FOR SCREENING MAMMOGRAM: ICD-10-CM

## 2022-06-20 DIAGNOSIS — Z85.3 PERSONAL HISTORY OF MALIGNANT NEOPLASM OF BREAST: ICD-10-CM

## 2022-06-20 PROCEDURE — 77067 SCR MAMMO BI INCL CAD: CPT

## 2022-08-21 NOTE — PROGRESS NOTES
Subjective:     Chief Complaint   Patient presents with    Establish Care       Caridad Downey is a 65 y.o. F.  She has a past medical history of hypertension, GERD, and osteoporosis currently on Prolia. I reviewed and updated the medical record. The patient was seen by her previous primary care provider most recently in mid February for routine follow-up. She was noted to have good control of her blood pressure with the use of amlodipine, benazepril, and hydrochlorothiazide. She was felt to be tolerating Prolia for her osteoporosis, and was noted also to be due for repeat follow-up bone mineral density testing. Physical examination at the time had been unremarkable. No changes were made to her medication regimen. Subsequent bone mineral density testing performed in late February had demonstrated continued osteoporosis, with a statistically significant increase in her bone mineral density. She was continued on Prolia. She reports that she had gone to DR. Marcos in Cardiology for evaluation of palpitations, found subsequently to be secondary to PVCs. She had been started subsequently on metoprolol succinate, with good control of her symptoms subsequently. She had been concerned about the possibility of heart disease as her father had multiple stents placed and her sister was recently diagnosed with heart disease as well. Today, the patient comes in for a Medicare wellness visit as well as routine follow-up on her chronic medical concerns. She reports feeling fine overall today and has no significant acute somatic complaints. She does have a longstanding history of cervicalgia and lumbago, for which she is pending follow-up with orthopedic surgery. She denies having had any recent urinary incontinence or numbness or tingling in her lower extremities or any weakness or changes in her gait.   She also has a history of right-sided breast cancer, for which she continues to be followed by her oncologist.  There has been no evidence of any recurrence over the past several years and she denies having had any fevers, bone pain, or night sweats or other constitutional or systemic complaints. She continues on a combination of metoprolol, hydrochlorothiazide, and benazepril for her hypertension. She denies having had any lightheadedness or dizziness and overall her blood pressure readings have typically been in the 130/80 mmHg range. No chest pain, shortness of breath, or any further cardiopulmonary concerns. She has a history of hypercholesterolemia, with her last LDL being approximately 110 mg/dL on laboratory studies obtained August of last year as reviewed below. She has not taken medication for this in the past.  We had a long discussion today regarding the evidence for statin therapy in patients younger than 75 years, as well as reviewing the USPS TF guidelines and ACC guidelines regarding statin use. After discussion regarding the risks and benefits, the patient is amenable to beginning a trial of low-dose atorvastatin today. She continues on Prolia for her history of osteoporosis, which she reports tolerating well. Her last bone mineral density obtained earlier in February of this year as reviewed below had demonstrated interval increase in her bone mineral density. She wishes to continue with therapy, and she continues on vitamin D supplementation for her vitamin D deficiency. Routine Healthcare Maintenance issues are reviewed and discussed with the patient as noted below. Orders to update gaps in healthcare maintenance were placed as noted below in the Assessment and Plan, where applicable. Her review of systems is otherwise negative. Medicare Wellness Questions: Patient lives at home and is completely independent with regard to activities and instrumental activities of daily living. Patient does not drink alcohol to excess.  Patient denies recent problems with memory, vision, hearing, balance or gait. Ambulates without difficulty. No abuse concerns. Patient denies recent depression or anxiety concerns. Patient is not using safety equipment in the home. 10-year Cardiovascular Risk Isaaccharmaine Franks, 2013): The 10-year ASCVD risk score (Simone Munson et al., 2013) is: 32.5%    Values used to calculate the score:      Age: 65 years      Sex: Female      Is Non- : No      Diabetic: No      Tobacco smoker: No      Systolic Blood Pressure: 302 mmHg      Is BP treated: Yes      HDL Cholesterol: 67 MG/DL      Total Cholesterol: 191 MG/DL       Past Medical History:  Past Medical History:   Diagnosis Date    Age-related osteoporosis without current pathological fracture 08/21/2017    RX = Prolia    Allergic rhinitis 08/21/2017    Diverticulosis of large intestine without hemorrhage 08/21/2017    DJD (degenerative joint disease) 08/21/2017    GERD (gastroesophageal reflux disease) 08/21/2017    Hepatic steatosis 02/2012    CT Finding    History of breast cancer 2009    Right breast     Hypertension 08/21/2017    Internal hemorrhoid 08/21/2017    Lumbar spinal stenosis 09/20/2017    S/P radiation therapy 2009    TMJ dysfunction 04/05/2018    Vitamin D deficiency 08/21/2017       Past Surgical Histor:  Past Surgical History:   Procedure Laterality Date    HX BREAST BIOPSY Right 2008    positive ultrasound core bx    HX BREAST BIOPSY Bilateral 2008    benign mri bx    HX BREAST LUMPECTOMY Right 2009    x2    HX CATARACT REMOVAL Bilateral     HX COLONOSCOPY      HX DILATION AND CURETTAGE      HX TONSILLECTOMY         Allergies: Allergies   Allergen Reactions    Sulfa (Sulfonamide Antibiotics) Unknown (comments)       Medications:  Current Outpatient Medications   Medication Sig Dispense Refill    metoprolol succinate (TOPROL-XL) 50 mg XL tablet Take 50 mg by mouth daily. zinc 50 mg tab tablet Take 50 mg by mouth daily.  Take 1/2 tabley      aspirin delayed-release 81 mg tablet Take 81 mg by mouth daily. benazepriL (LOTENSIN) 20 mg tablet Take 1 Tablet by mouth daily. 90 Tablet 0    hydroCHLOROthiazide (HYDRODIURIL) 25 mg tablet Take 1 Tablet by mouth daily. 90 Tablet 0    omeprazole (PRILOSEC) 10 mg capsule Take 10 mg by mouth daily. naproxen sod/diphenhydramine (ALEVE PM PO) Take  by mouth nightly as needed. denosumab (PROLIA) 60 mg/mL injection 60 mg by SubCUTAneous route every 6 months. MULTIVITAMIN (MULTIPLE VITAMIN PO) Take  by mouth. amLODIPine (NORVASC) 5 mg tablet Take 1 Tablet by mouth daily. 90 Tablet 0    zinc 50 mg tab tablet Take  by mouth daily. Social History:  Social History     Socioeconomic History    Marital status:     Number of children: 3   Tobacco Use    Smoking status: Never    Smokeless tobacco: Never   Vaping Use    Vaping Use: Never used   Substance and Sexual Activity    Alcohol use: No       Family History:  Family History   Problem Relation Age of Onset    Breast Cancer Sister 61    COPD Mother     Stroke Father        Immunizations:  Immunization History   Administered Date(s) Administered    COVID-19, PFIZER PURPLE top, DILUTE for use, (age 15 y+), IM, 30mcg/0.3mL 03/01/2021, 03/22/2021, 11/30/2021    Influenza High Dose Vaccine PF 09/18/2018, 09/20/2019, 09/15/2020, 09/30/2021    Influenza Vaccine 10/01/2014, 10/25/2017    Pneumococcal Conjugate (PCV-13) 09/10/2015    Pneumococcal Vaccine (Unspecified Type) 10/01/2013        Healthcare Maintenance:  Health Maintenance   Topic Date Due    DTaP/Tdap/Td series (1 - Tdap) Never done    Shingrix Vaccine Age 50> (1 of 2) Never done    COVID-19 Vaccine (4 - Booster for [employer] series) 03/30/2022    Flu Vaccine (1) 09/01/2022    Depression Screen  02/15/2023    Medicare Yearly Exam  08/26/2023    Hepatitis C Screening  Completed    Pneumococcal 65+ years  Completed        Review of Systems:  ROS:  Review of Systems   Constitutional: Negative. HENT: Negative.      Eyes: Negative. Respiratory: Negative. Cardiovascular: Negative. Gastrointestinal: Negative. Genitourinary: Negative. Musculoskeletal:  Positive for back pain and neck pain. Skin: Negative. Neurological: Negative. Endo/Heme/Allergies: Negative. Psychiatric/Behavioral: Negative. ROS otherwise negative      Objective:     Vital Signs:  Visit Vitals  /74 (BP 1 Location: Left upper arm, BP Patient Position: Sitting, BP Cuff Size: Adult)   Pulse 60   Temp 98 °F (36.7 °C) (Oral)   Resp 18   Ht 5' 4.5\" (1.638 m)   Wt 153 lb 4.8 oz (69.5 kg)   LMP  (LMP Unknown)   SpO2 96%   BMI 25.91 kg/m²       BMI:  Body mass index is 25.91 kg/m². Physical Examination:  Physical Exam  Constitutional:       Appearance: Normal appearance. She is normal weight. HENT:      Head: Normocephalic and atraumatic. Right Ear: External ear normal.      Left Ear: External ear normal.      Nose: Nose normal.      Mouth/Throat:      Mouth: Mucous membranes are moist.      Pharynx: Oropharynx is clear. No oropharyngeal exudate or posterior oropharyngeal erythema. Cardiovascular:      Rate and Rhythm: Normal rate and regular rhythm. Pulses: Normal pulses. Heart sounds: Normal heart sounds. No murmur heard. No friction rub. No gallop. Pulmonary:      Effort: Pulmonary effort is normal. No respiratory distress. Breath sounds: Normal breath sounds. No wheezing, rhonchi or rales. Abdominal:      General: Abdomen is flat. Bowel sounds are normal. There is no distension. Palpations: Abdomen is soft. Tenderness: There is no abdominal tenderness. There is no guarding. Musculoskeletal:         General: No swelling, tenderness or deformity. Normal range of motion. Cervical back: Normal range of motion and neck supple. No rigidity or tenderness. Skin:     General: Skin is warm and dry. Findings: No erythema, lesion or rash.    Neurological:      General: No focal deficit present. Mental Status: She is alert and oriented to person, place, and time. Mental status is at baseline. Sensory: No sensory deficit. Motor: No weakness. Gait: Gait normal.      Deep Tendon Reflexes: Reflexes normal.   Psychiatric:         Mood and Affect: Mood normal.         Behavior: Behavior normal.         Judgment: Judgment normal.        Physical exam otherwise negative    Diagnostic Testing:    Laboratory Studies:  No visits with results within 1 Year(s) from this visit. Latest known visit with results is:   Office Visit on 08/13/2021   Component Date Value Ref Range Status    Hep C virus Ab Interp. 08/13/2021 NONREACTIVE  NONREACTIVE   Final    Method used is Siemens Advia Centaur    Vitamin D 25-Hydroxy 08/13/2021 34.7  30 - 100 ng/mL Final    Comment: (NOTE)  Deficiency               <20 ng/mL  Insufficiency          20-30 ng/mL  Sufficient             ng/mL  Possible toxicity       >100 ng/mL    The Method used is Siemens Advia Centaur currently standardized to a   Center of Disease Control and Prevention (CDC) certified reference   [facility]. Samples containing fluorescein dye can produce falsely   elevated values when tested with the ADVIA Centaur Vitamin D Assay. It is recommended that results in the toxic range, >100 ng/mL, be   retested 72 hours post fluorescein exposure.       Color 08/13/2021 YELLOW/STRAW    Final    Color Reference Range: Straw, Yellow or Dark Yellow    Appearance 08/13/2021 CLEAR  CLEAR   Final    Specific gravity 08/13/2021 1.015  1.003 - 1.030   Final    pH (UA) 08/13/2021 6.0  5.0 - 8.0   Final    Protein 08/13/2021 Negative  Negative mg/dL Final    Glucose 08/13/2021 Negative  Negative mg/dL Final    Ketone 08/13/2021 Negative  Negative mg/dL Final    Bilirubin 08/13/2021 Negative  Negative   Final    Blood 08/13/2021 Negative  Negative   Final    Urobilinogen 08/13/2021 0.2  0.2 - 1.0 EU/dL Final    Nitrites 08/13/2021 Negative  Negative   Final Leukocyte Esterase 08/13/2021 Negative  Negative   Final    WBC 08/13/2021 0-4  0 - 4 /hpf Final    RBC 08/13/2021 0-5  0 - 5 /hpf Final    Epithelial cells 08/13/2021 FEW  FEW /lpf Final    Comment: Epithelial cell category consists of squamous cells and /or transitional  urothelial cells. Renal tubular cells, if present, are separately identified as  such. Bacteria 08/13/2021 Negative  Negative /hpf Final    UA:UC IF INDICATED 08/13/2021 CULTURE NOT INDICATED BY UA RESULT  CULTURE NOT INDICATED BY UA RESULT   Final    TSH 08/13/2021 2.10  0.36 - 3.74 uIU/mL Final    Comment:   Due to TSH heterogeneity, both structurally and degree of glycosylation,  monoclonal antibodies used in the TSH assay may not accurately quantitate TSH. Therefore, this result should be correlated with clinical findings as well as  with other assessments of thyroid function, e.g., free T4, free T3. Sodium 08/13/2021 137  136 - 145 mmol/L Final    Potassium 08/13/2021 4.8  3.5 - 5.1 mmol/L Final    Chloride 08/13/2021 103  97 - 108 mmol/L Final    CO2 08/13/2021 30  21 - 32 mmol/L Final    Anion gap 08/13/2021 4 (A) 5 - 15 mmol/L Final    Glucose 08/13/2021 91  65 - 100 mg/dL Final    BUN 08/13/2021 16  6 - 20 MG/DL Final    Creatinine 08/13/2021 0.65  0.55 - 1.02 MG/DL Final    BUN/Creatinine ratio 08/13/2021 25 (A) 12 - 20   Final    GFR est AA 08/13/2021 >60  >60 ml/min/1.73m2 Final    GFR est non-AA 08/13/2021 >60  >60 ml/min/1.73m2 Final    Comment: Estimated GFR is calculated using the IDMS-traceable Modification of Diet in  Renal Disease (MDRD) Study equation, reported for both  Americans  (GFRAA) and non- Americans (GFRNA), and normalized to 1.73m2 body  surface area. The physician must decide which value applies to the patient.       Calcium 08/13/2021 9.8  8.5 - 10.1 MG/DL Final    Bilirubin, total 08/13/2021 0.6  0.2 - 1.0 MG/DL Final    ALT (SGPT) 08/13/2021 49  12 - 78 U/L Final    AST (SGOT) 08/13/2021 28  15 - 37 U/L Final    Alk. phosphatase 08/13/2021 72  45 - 117 U/L Final    Protein, total 08/13/2021 7.5  6.4 - 8.2 g/dL Final    Albumin 08/13/2021 4.2  3.5 - 5.0 g/dL Final    Globulin 08/13/2021 3.3  2.0 - 4.0 g/dL Final    A-G Ratio 08/13/2021 1.3  1.1 - 2.2   Final    Cholesterol, total 08/13/2021 191  <200 MG/DL Final    Triglyceride 08/13/2021 72  <150 MG/DL Final    Comment: Based on NCEP-ATP III:  Triglycerides <150 mg/dL  is considered normal, 150-199  mg/dL  borderline high,  200-499 mg/dL high and  greater than or equal to 500  mg/dL very high. HDL Cholesterol 08/13/2021 67  MG/DL Final    Comment: Based on NCEP ATP III, HDL Cholesterol <40 mg/dL is considered low and >60  mg/dL is elevated.       LDL, calculated 08/13/2021 109.6 (A) 0 - 100 MG/DL Final    Comment: Based on the NCEP-ATP: LDL-C concentrations are considered  optimal <100 mg/dL,  near optimal/above Normal 100-129 mg/dL Borderline High: 130-159, High: 160-189  mg/dL Very High: Greater than or equal to 190 mg/dL      VLDL, calculated 08/13/2021 14.4  MG/DL Final    CHOL/HDL Ratio 08/13/2021 2.9  0.0 - 5.0   Final    WBC 08/13/2021 7.0  3.6 - 11.0 K/uL Final    RBC 08/13/2021 4.72  3.80 - 5.20 M/uL Final    HGB 08/13/2021 14.6  11.5 - 16.0 g/dL Final    HCT 08/13/2021 44.5  35.0 - 47.0 % Final    MCV 08/13/2021 94.3  80.0 - 99.0 FL Final    MCH 08/13/2021 30.9  26.0 - 34.0 PG Final    MCHC 08/13/2021 32.8  30.0 - 36.5 g/dL Final    RDW 08/13/2021 12.7  11.5 - 14.5 % Final    PLATELET 46/76/6730 599  150 - 400 K/uL Final    MPV 08/13/2021 11.2  8.9 - 12.9 FL Final    NRBC 08/13/2021 0.0  0  WBC Final    ABSOLUTE NRBC 08/13/2021 0.00  0.00 - 0.01 K/uL Final    NEUTROPHILS 08/13/2021 66  32 - 75 % Final    LYMPHOCYTES 08/13/2021 22  12 - 49 % Final    MONOCYTES 08/13/2021 8  5 - 13 % Final    EOSINOPHILS 08/13/2021 3  0 - 7 % Final    BASOPHILS 08/13/2021 1  0 - 1 % Final    IMMATURE GRANULOCYTES 08/13/2021 0  0.0 - 0.5 % Final ABS. NEUTROPHILS 08/13/2021 4.6  1.8 - 8.0 K/UL Final    ABS. LYMPHOCYTES 08/13/2021 1.5  0.8 - 3.5 K/UL Final    ABS. MONOCYTES 08/13/2021 0.6  0.0 - 1.0 K/UL Final    ABS. EOSINOPHILS 08/13/2021 0.2  0.0 - 0.4 K/UL Final    ABS. BASOPHILS 08/13/2021 0.1  0.0 - 0.1 K/UL Final    ABS. IMM. GRANS. 08/13/2021 0.0  0.00 - 0.04 K/UL Final    DF 08/13/2021 AUTOMATED    Final         Radiographic Studies:  XR Results (most recent):  Results from Hospital Encounter encounter on 08/13/21    XR SPINE CERV PA LAT ODONT 3 V MAX    Narrative  EXAM: XR SPINE CERV PA LAT ODONT 3 V MAX    INDICATION: Pain. COMPARISON: None. FINDINGS: AP, lateral, and open mouth odontoid views of the cervical spine were  obtained. There is grade 1 2 mm anterolisthesis of C3 on C4 with otherwise  intact alignment. The vertebral body heights are preserved. There is loss of  vertical disc height mildly at the C3-4 level and moderately at the C4-5, C5-6,  and C6-7 levels with predominantly anterior marginal osteophytes. There is no  fracture. The prevertebral soft tissues are normal. The odontoid process is  intact and the C1-C2 relationship is normal.    Impression  Degenerative spine changes as above. No fracture or other acute  findings. [facility] Results (most recent):  Results from Hospital Encounter encounter on 06/20/22    [facility] MAMMO BI SCREENING INCL CAD    Narrative  STUDY: Bilateral digital screening mammogram    INDICATION:  Screening. Right breast cancer 2009. COMPARISON: 2021 and prior. BREAST COMPOSITION:  There are scattered areas of fibroglandular density. FINDINGS: Bilateral digital screening mammography was performed and is  interpreted in conjunction with a computer assisted detection (CAD) system. No  suspicious masses or calcifications are identified. There has been no  significant change including right treatment. .    Impression  BI-RADS 2: Benign. No mammographic evidence of malignancy.     RECOMMENDATIONS:  Next screening mammogram is recommended in one year. The patient will be notified of these results. CT Results (most recent):  Results from Hospital Encounter encounter on 02/20/12    CT ABDOMEN PELVIS WITH CONTRAST    Narrative  **Final Report**      ICD Codes / Adm. Diagnosis: 174.4   / MALIGNANT NEOPLASM OF UPPER-OU  Examination:  CT ABD PELVIS W CON  - [id number] - Feb 20 2012  9:42AM  Accession No:  [id number]  Reason:  BREAST CA      REPORT:  EXAM: CT CHEST WITH CONTRAST    INDICATION: Breast carcinoma    COMPARISON: 12/17/2009. CONTRAST: 100 mL of Optiray- 350. TECHNIQUE:  Multislice helical CT was performed from the thoracic inlet to the adrenal  glands during uneventful rapid bolus intravenous contrast administration. Contiguous 5 mm axial images were reconstructed and lung and soft tissue  windows were generated. Coronal and sagittal  reformations were generated. CT dose reduction was achieved through use of a standardized protocol  tailored for this examination and automatic exposure control for dose  modulation. FINDINGS:  There is a large hiatal hernia. The lungs are clear of mass, nodule, airspace disease or edema. The aorta is minimally atherosclerotic and enhances normally with no  evidence of aneurysm or dissection. There is no mediastinal or hilar or axillary adenopathy. There is mild dextro-convex scoliosis and degenerative change of the  thoracic spine. IMPRESSION:    1. No evidence of metastatic disease within the chest.  2. Hiatal hernia. 3. Minimal atherosclerosis. EXAM: CT ABDOMEN PELVIS WITH CONTRAST    INDICATION:  Breast carcinoma. COMPARISON: 12/17/2009. CONTRAST: 100 mL of Optiray- 350. TECHNIQUE:  Multislice helical CT was performed from the diaphragm to the symphysis  pubis during uneventful rapid bolus intravenous contrast administration. Oral contrast was also administered. Delayed images of the abdomen were  acquired.  Contiguous 5 mm axial images were reconstructed and lung and soft  tissue windows were generated. Coronal reformations were generated. CT dose  reduction was achieved through use of a standardized protocol tailored for  this examination and automatic exposure control for dose modulation. FINDINGS:  There is decreased attenuation diffusely throughout the liver. There are no  focal abnormalities within the liver, spleen, pancreas, adrenal glands or  kidneys. The aorta  tapers without aneurysm. There is no retroperitoneal adenopathy  or mass. There is a hiatal hernia. The small bowel is normal.  The appendix is not  identified. There is no inflammatory process adjacent to the cecum. There is  no ascites or free intraperitoneal air. The uterus and bladder are normal.   There is no pelvic mass or adenopathy. The delayed images demonstrate bilateral excretion of contrast into the  renal collecting systems. There are degenerative disc changes and retrolisthesis at L2-L3 and  degenerative disc changes at L5-S1. IMPRESSION:  1. No evidence of metastatic disease within the abdomen or pelvis. 2. Hiatal hernia. 3. Hepatic steatosis. 4. Lumbar spondylosis. Signing/Reading Doctor: Mario Brunner ([id number])  Approved: Mario Brunner ([id number])  02/20/2012     DEXA Results (most recent):  Results from [place] encounter on 02/23/22    DEXA BONE DENSITY STUDY AXIAL    Narrative  Bone Mineral Density    Indication: Monitoring, Prolia therapy. Age: 67.  Sex: Female. Menopause status: postmenopausal.. Hormone replacement therapy: No.    Number of falls in the past year: None. .  Risk factors for osteoporosis: None. .    Current medication for osteoporosis: Prolia. Comparison: 2019    Technique: Imaging was performed on the [employer].   World Health Organization  meta-analysis fracture risk calculator (FRAX) analysis was performed for 10 year  fracture risk probability assessment    Excluded sites: Lumbar spine due to degenerative changes    Findings:        Femoral Neck Right: . Bone mineral density (gm/cm2): 0.845.  % of peak bone mass: 81.  % for age matched controls:  110. T-score: -1.4.  Z-score: 0.6. Total Hip Left: . Bone mineral density (gm/cm2): 0.865.  % of peak bone mass: 83.  % for age matched controls:  80. T-score: -1.1. Z-score: 0.7.    33% Radius Left: . Bone mineral density (gm/cm2): 0.538.  % of peak bone mass: 61.  % for age matched controls:  80.  T-score: -3.9.  Z-score: -1.5. Impression  Impression: This patient is osteoporotic using the World Health Organization criteria  As compared to the prior study, there has been a statistically significant  increase in bone mineral density. 10 year probability of major osteoporotic fracture: 12.3%. 10 year probability of hip fracture: 2.6%. Recommendations:  Therapy recommendations need to be tailored to each individual patient. Using  the [address]) FRAX absolute fracture algorithm, the  [address] [place] [address] recommends beginning pharmacological therapy in  postmenopausal women and men over the age of 48 with a 8 year probability of a  hip fracture of >3% OR with the 10 year probability of a major osteoporotic  fracture of >20%. Please reconsider testing based on risk factors. Currently, Medicare will only  reimburse for a central DXA examination every two years, unless the patient is  on chronic glucocorticoid therapy. Note: Please note that reliable, valid comparisons cannot be made between  studies which have been performed on machines from different manufacturers. If  clinically warranted, a follow up study performed at this site, on the same  unit, would allow the most sensitive assessment of change in bone mineral  density.      MRI Results (most recent):  Results from [place] encounter on 08/18/21    MRI CERV SPINE WO CONT    Narrative  EXAM: MRI CERV SPINE WO CONT    INDICATION: Neck pain; Chronic neck pain duration >= 3 months; Chronic neck  pain, no complicating feature; Worsening or not improving; Adequate conservative  therapy; No known/automatically detected potential contraindications to MRI. Other cervical disc degeneration, unspecified cervical region / Right neck pain,  DDD, spondylosis    COMPARISON: Cervical spine radiographs 8/13/2021    TECHNIQUE: MR imaging of the cervical spine was performed using the following  sequences: sagittal T1, T2, STIR;  axial T2, T1.    CONTRAST:  None. FINDINGS:    Stepwise mild anterolisthesis from C3-C5. Reversal of the normal cervical  lordosis centered at about C4-C5. Vertebral body heights are maintained. Multilevel degenerative endplate osteophytes. Degenerative endplate marrow edema  at C5-C6. Fibrofatty endplate changes from H6-C1. Marked atlantodental  arthropathy    The craniocervical junction is intact. The course and signal intensity of the  spinal cord are normal.    Paraspinal muscle atrophy. Patchy biapical pleural-parenchymal scarring    C2-C3: No stenosis    C3-C4: Anterolisthesis. Disc pseudobulge. Left uncovertebral hypertrophy. Bilateral facet arthropathy. Ligament of flavum thickening. No significant  spinal stenosis. Severe left and mild right foraminal stenosis    C4-C5: Anterolisthesis. Disc pseudobulge with superimposed central disc  extrusion indenting the ventral spinal cord. Facet arthropathy. Ligamentum  flavum thickening. Mild-to-moderate spinal stenosis. Moderate bilateral  foraminal stenosis    C5-C6: Disc bulge. Uncovertebral hypertrophy. Facet arthropathy. Ligament flavum  thickening. Moderate spinal stenosis. Severe bilateral foraminal stenosis    C6-C7: Disc bulge. Uncovertebral hypertrophy. Ligament flavum thickening. Mild  spinal stenosis. Moderate left and mild right foraminal stenosis    C7-T1: No stenosis    Impression  1.   Multilevel degenerative changes, disc disease, and listhesis on a background  of kyphosis. Multilevel mild to moderate spinal stenosis and varying degrees of  foraminal stenosis, worst at C5-C6. 2.  Central disc extrusion indenting the ventral spinal cord at C4-C5. Assessment/Plan:       ICD-10-CM ICD-9-CM    1. Medicare annual wellness visit, subsequent  Z00.00 V70.0       2. Essential hypertension  I10 401.9 CBC WITH AUTOMATED DIFF      METABOLIC PANEL, COMPREHENSIVE      LIPID PANEL      MICROALBUMIN, UR, RAND W/ MICROALB/CREAT RATIO      3. Age-related osteoporosis without current pathological fracture  M81.0 733.01                  Healthcare Maintenance:  - Preventive measures are reviewed as per above  - Up to date on routine interventions except as noted above  - Orders placed to update gaps as noted  - Notes: Up-to-date with regard to routine screening measures. Last bone mineral density test as noted above February 2022. Osteoporosis/Osteopenia:   - DEXA HX: 2/22 - interval increase in BMD.   - Advised Calcium and Vitamin D supplementation   - Treatment Indicated based on Osteoporosis or FRAX Score: YES    - Bisphosphonate: NO    - Denosumab: YES    - Teriparitide: NO     - Other: NO   - Notes: Continuing with current Prolia, will continue lifelong. Essential Hypertension/Blood Pressure Management:   - Home BP Readings: not doing   - Current Control: high-normal   - Target BP: <140/90 mmHg   - Relevant BP Meds:  Key CAD CHF Meds               metoprolol succinate (TOPROL-XL) 50 mg XL tablet (Taking) Take 50 mg by mouth daily. aspirin delayed-release 81 mg tablet (Taking) Take 81 mg by mouth daily. benazepriL (LOTENSIN) 20 mg tablet (Taking) Take 1 Tablet by mouth daily. hydroCHLOROthiazide (HYDRODIURIL) 25 mg tablet (Taking) Take 1 Tablet by mouth daily.     amLODIPine (NORVASC) 5 mg tablet Take 1 Tablet by mouth daily.               - Plan: continue current treatment regimen, continue current meds, dietary sodium restriction, regular aerobic exercise   - Notes: Tolerating therapy, checking labs. History of PVCs:   - On metoprolol succinate as per Cardiology. Symptomatically well controlled. Continuing with current care. Hyperlipidemia/Dyslipidemia:   - Summary of Cardiovascular Risks and Goals:     LDL goal is under 100  hypertension  hyperlipidemia  [place] 10-year risk >20%   -   Lab Results   Component Value Date/Time    LDL, calculated 109.6 (H) 08/13/2021 11:55 AM    HDL Cholesterol 67 08/13/2021 11:55 AM       - Relevant Cholesterol Meds:  Key Antihyperlipidemia Meds               atorvastatin (LIPITOR) 10 mg tablet (Taking) Take 1 Tablet by mouth nightly for 360 days. - Cholesterol at target: yes   - Does patient meet USPSTF and ACC/AHA indications for pharmacotherapy (e.g., statin): no but willing to begin therapy after discussion re: RBAI   - GI symptoms with meds: N/A   - Muscle aches with meds: N/A   - Other Adverse effects with meds: N/A   - Medication Plan: start   - Notes: checking repeat lipid panel, trial low dose atorvastatin 10 mg/d today, discontinuation and return precautions provided, Rodriguez Kasper        I have reviewed the patient's medical history in detail and updated the computerized patient record. We had a prolonged discussion about these complex clinical issues and went over the various important aspects to consider. All questions were answered. Advised the patient to call back or return to office if symptoms do not improve, change in nature, or persist.     The patient was given an after visit summary or informed of [facility] Access which includes patient instructions, diagnoses, current medications, & vitals. she expressed understanding with the diagnosis and plan. Alexus Brown MD    Please note that this dictation was completed with [email or web address], the [employer] voice recognition software.   Quite often unanticipated grammatical, syntax, homophones, and other interpretive errors are inadvertently transcribed by the computer software. Please disregard these errors. Please excuse any errors that have escaped final proofreading. This is the Subsequent Medicare Annual Wellness Exam, performed 12 months or more after the Initial AWV or the last Subsequent AWV    I have reviewed the patient's medical history in detail and updated the computerized patient record. Assessment/Plan   Education and counseling provided:  Are appropriate based on today's review and evaluation    1. Medicare annual wellness visit, subsequent  2. Essential hypertension  -     CBC WITH AUTOMATED DIFF; Future  -     METABOLIC PANEL, COMPREHENSIVE; Future  -     LIPID PANEL; Future  -     MICROALBUMIN, UR, RAND W/ MICROALB/CREAT RATIO; Future  3. Age-related osteoporosis without current pathological fracture       Depression Risk Factor Screening     3 most recent PHQ Screens 8/25/2022   Little interest or pleasure in doing things Not at all   Feeling down, depressed, irritable, or hopeless Not at all   Total Score PHQ 2 0       Alcohol & Drug Abuse Risk Screen    Do you average more than 1 drink per night or more than 7 drinks a week:  No    On any one occasion in the past three months have you have had more than 3 drinks containing alcohol:  No          Functional Ability and Level of Safety    Hearing: Hearing is good. Activities of Daily Living: The home contains: no safety equipment. Patient does total self care      Ambulation: with no difficulty     Fall Risk:  Fall Risk Assessment, last 12 mths 8/25/2022   Able to walk? Yes   Fall in past 12 months? 0   Do you feel unsteady?  0   Are you worried about falling 0      Abuse Screen:  Patient is not abused       Cognitive Screening    Has your family/caregiver stated any concerns about your memory: no     Cognitive Screening: Normal - Verbal Fluency Test    Health Maintenance Due     Health Maintenance Due   Topic Date Due    DTaP/Tdap/Td series (1 - Tdap) Never done    Shingrix Vaccine Age 50> (1 of 2) Never done    COVID-19 Vaccine (4 - Booster for Pfizer series) 03/30/2022       Patient Care Team   Patient Care Team:  Chucky Perez MD as PCP - General (Internal Medicine Physician)  Chucky Perez MD as PCP [facility]  Chinyere Lim MD (Ophthalmology)    History     Patient Active Problem List   Diagnosis Code    Allergic rhinitis J30.9    Internal hemorrhoid K64.8    Diverticulosis of large intestine without hemorrhage K57.30    Vitamin D deficiency E55.9    Age-related osteoporosis without current pathological fracture M81.0    GERD (gastroesophageal reflux disease) K21.9    Hypertension I10    DJD (degenerative joint disease) M19.90    Swelling of thumb, right M79.89    Lumbar spinal stenosis M48.061    TMJ dysfunction M26.609    Herpes zoster without complication D06.5    Cervical pain M54.2    Cervical spinal stenosis M48.02    Irregular heart beat I49.9     Past Medical History:   Diagnosis Date    Age-related osteoporosis without current pathological fracture 08/21/2017    RX = Prolia    Allergic rhinitis 08/21/2017    Diverticulosis of large intestine without hemorrhage 08/21/2017    DJD (degenerative joint disease) 08/21/2017    GERD (gastroesophageal reflux disease) 08/21/2017    Hepatic steatosis 02/2012    CT Finding    History of breast cancer 2009    Right breast     Hypertension 08/21/2017    Internal hemorrhoid 08/21/2017    Lumbar spinal stenosis 09/20/2017    S/P radiation therapy 2009    TMJ dysfunction 04/05/2018    Vitamin D deficiency 08/21/2017      Past Surgical History:   Procedure Laterality Date    HX BREAST BIOPSY Right 2008    positive ultrasound core bx    HX BREAST BIOPSY Bilateral 2008    benign mri bx    HX BREAST LUMPECTOMY Right 2009    x2    HX CATARACT REMOVAL Bilateral     HX COLONOSCOPY      HX DILATION AND CURETTAGE      HX TONSILLECTOMY       Current Outpatient Medications   Medication Sig Dispense Refill    metoprolol succinate (TOPROL-XL) 50 mg XL tablet Take 50 mg by mouth daily. zinc 50 mg tab tablet Take 50 mg by mouth daily. Take 1/2 tabley      aspirin delayed-release 81 mg tablet Take 81 mg by mouth daily. benazepriL (LOTENSIN) 20 mg tablet Take 1 Tablet by mouth daily. 90 Tablet 0    hydroCHLOROthiazide (HYDRODIURIL) 25 mg tablet Take 1 Tablet by mouth daily. 90 Tablet 0    omeprazole (PRILOSEC) 10 mg capsule Take 10 mg by mouth daily. naproxen sod/diphenhydramine (ALEVE PM PO) Take  by mouth nightly as needed. denosumab (PROLIA) 60 mg/mL injection 60 mg by SubCUTAneous route every 6 months. MULTIVITAMIN (MULTIPLE VITAMIN PO) Take  by mouth. amLODIPine (NORVASC) 5 mg tablet Take 1 Tablet by mouth daily. 90 Tablet 0    zinc 50 mg tab tablet Take  by mouth daily.        Allergies   Allergen Reactions    Sulfa (Sulfonamide Antibiotics) Unknown (comments)       Family History   Problem Relation Age of Onset    Breast Cancer Sister 61    COPD Mother     Stroke Father      Social History     Tobacco Use    Smoking status: Never    Smokeless tobacco: Never   Substance Use Topics    Alcohol use: No         [place], MD

## 2022-08-25 ENCOUNTER — OFFICE VISIT (OUTPATIENT)
Dept: INTERNAL MEDICINE CLINIC | Age: 77
End: 2022-08-25
Payer: MEDICARE

## 2022-08-25 VITALS
DIASTOLIC BLOOD PRESSURE: 74 MMHG | SYSTOLIC BLOOD PRESSURE: 137 MMHG | WEIGHT: 153.3 LBS | HEIGHT: 65 IN | RESPIRATION RATE: 18 BRPM | OXYGEN SATURATION: 96 % | HEART RATE: 60 BPM | TEMPERATURE: 98 F | BODY MASS INDEX: 25.54 KG/M2

## 2022-08-25 DIAGNOSIS — I10 ESSENTIAL HYPERTENSION: ICD-10-CM

## 2022-08-25 DIAGNOSIS — M81.0 AGE-RELATED OSTEOPOROSIS WITHOUT CURRENT PATHOLOGICAL FRACTURE: ICD-10-CM

## 2022-08-25 DIAGNOSIS — E55.9 VITAMIN D DEFICIENCY: ICD-10-CM

## 2022-08-25 DIAGNOSIS — Z00.00 MEDICARE ANNUAL WELLNESS VISIT, SUBSEQUENT: Primary | ICD-10-CM

## 2022-08-25 DIAGNOSIS — I49.3 SYMPTOMATIC PVCS: ICD-10-CM

## 2022-08-25 DIAGNOSIS — E78.00 HYPERCHOLESTEROLEMIA: ICD-10-CM

## 2022-08-25 PROCEDURE — G0439 PPPS, SUBSEQ VISIT: HCPCS | Performed by: INTERNAL MEDICINE

## 2022-08-25 PROCEDURE — 99214 OFFICE O/P EST MOD 30 MIN: CPT | Performed by: INTERNAL MEDICINE

## 2022-08-25 PROCEDURE — 1123F ACP DISCUSS/DSCN MKR DOCD: CPT | Performed by: INTERNAL MEDICINE

## 2022-08-25 RX ORDER — ATORVASTATIN CALCIUM 10 MG/1
10 TABLET, FILM COATED ORAL
Qty: 90 TABLET | Refills: 3 | Status: SHIPPED | OUTPATIENT
Start: 2022-08-25 | End: 2023-08-20

## 2022-08-25 RX ORDER — HYDROCHLOROTHIAZIDE 25 MG/1
25 TABLET ORAL DAILY
Qty: 90 TABLET | Refills: 3 | Status: SHIPPED | OUTPATIENT
Start: 2022-08-25 | End: 2023-08-20

## 2022-08-25 RX ORDER — METOPROLOL SUCCINATE 50 MG/1
50 TABLET, EXTENDED RELEASE ORAL DAILY
COMMUNITY
Start: 2022-08-01

## 2022-08-25 RX ORDER — ASPIRIN 81 MG/1
81 TABLET ORAL DAILY
COMMUNITY
End: 2022-10-14

## 2022-08-25 RX ORDER — BENAZEPRIL HYDROCHLORIDE 20 MG/1
20 TABLET ORAL DAILY
Qty: 90 TABLET | Refills: 3 | Status: SHIPPED | OUTPATIENT
Start: 2022-08-25 | End: 2023-08-20

## 2022-08-25 NOTE — PATIENT INSTRUCTIONS
Learning About the [facility] Diet  What is the Mediterranean diet? The Mediterranean diet is a style of eating rather than a diet plan. It features foods eaten in [place], [place], [place] and [place], and other countries along the [facility]. It emphasizes eating foods like fish, fruits, vegetables, beans, high-fiber breads and whole grains, nuts, and olive oil. This style of eating includes limited red meat, cheese, and sweets. Why choose the Mediterranean diet? A Mediterranean-style diet may improve heart health. It contains more fat than other heart-healthy diets. But the fats are mainly from nuts, unsaturated oils (such as fish oils and olive oil), and certain nut or seed oils (such as canola, soybean, or flaxseed oil). These fats may help protect the heart and blood vessels. How can you get started on the Mediterranean diet? Here are some things you can do to switch to a more Mediterranean way of eating. What to eat  Eat a variety of fruits and vegetables each day, such as grapes, blueberries, tomatoes, broccoli, peppers, figs, olives, spinach, eggplant, beans, lentils, and chickpeas. Eat a variety of whole-grain foods each day, such as oats, brown rice, and whole wheat bread, pasta, and couscous. Eat fish at least 2 times a week. Try tuna, salmon, mackerel, lake trout, herring, or sardines. Eat moderate amounts of low-fat dairy products, such as milk, cheese, or yogurt. Eat moderate amounts of poultry and eggs. Choose healthy (unsaturated) fats, such as nuts, olive oil, and certain nut or seed oils like canola, soybean, and flaxseed. Limit unhealthy (saturated) fats, such as butter, palm oil, and coconut oil. And limit fats found in animal products, such as meat and dairy products made with whole milk. Try to eat red meat only a few times a month in very small amounts. Limit sweets and desserts to only a few times a week. This includes sugar-sweetened drinks like soda.   The Mediterranean diet may also include red wine with your meal--1 glass each day for women and up to 2 glasses a day for men. Tips for eating at home  Use herbs, spices, garlic, lemon zest, and citrus juice instead of salt to add flavor to foods. Add avocado slices to your sandwich instead of brock. Have fish for lunch or dinner instead of red meat. Brush the fish with olive oil, and broil or grill it. Sprinkle your salad with seeds or nuts instead of cheese. Cook with olive or canola oil instead of butter or oils that are high in saturated fat. Switch from 2% milk or whole milk to 1% or fat-free milk. Dip raw vegetables in a vinaigrette dressing or hummus instead of dips made from mayonnaise or sour cream.  Have a piece of fruit for dessert instead of a piece of cake. Try baked apples, or have some dried fruit. Tips for eating out  Try broiled, grilled, baked, or poached fish instead of having it fried or breaded. Ask your  to have your meals prepared with olive oil instead of butter. Order dishes made with marinara sauce or sauces made from olive oil. Avoid sauces made from cream or mayonnaise. Choose whole-grain breads, whole wheat pasta and pizza crust, brown rice, beans, and lentils. Cut back on butter or margarine on bread. Instead, you can dip your bread in a small amount of olive oil. Ask for a side salad or grilled vegetables instead of french fries or chips. Where can you learn more? Go to http://www.house.com/  Enter O407 in the search box to learn more about \"Learning About the Mediterranean Diet. \"  Current as of: September 8, 2021               Content Version: 13.2  © 8093-1072 Healthwise, Incorporated. Care instructions adapted under license by [employer] (which disclaims liability or warranty for this information).  If you have questions about a medical condition or this instruction, always ask your healthcare professional. [facility] disclaims any warranty or liability for your use of this information. Atorvastatin (By mouth)   Atorvastatin (a-tor-va-STAT-in)  Treats high cholesterol and triglyceride levels. Reduces the risk of angina, stroke, heart attack, or certain heart and blood vessel problems. This medicine is a statin. Brand Name(s): Lipitor   There may be other brand names for this medicine. When This Medicine Should Not Be Used: This medicine is not right for everyone. Do not use it if you had an allergic reaction to atorvastatin, if you have active liver disease, or if you are pregnant or breastfeeding. How to Use This Medicine:   Tablet  Take your medicine as directed. Your dose may need to be changed several times to find what works best for you. Take this medicine at the same time each day. Swallow the tablet whole. Do not break it. Missed dose: Take a dose as soon as you remember. If it is less than 12 hours until your next dose, skip the missed dose and take the next dose at the regular time. Do not take 2 doses of this medicine within 12 hours. Read and follow the patient instructions that come with this medicine. Talk to your doctor or pharmacist if you have any questions. Store the medicine in a closed container at room temperature, away from heat, moisture, and direct light. Drugs and Foods to Avoid:   Ask your doctor or pharmacist before using any other medicine, including over-the-counter medicines, vitamins, and herbal products. Some medicines can affect how atorvastatin works. Tell your doctor if you also use birth control pills, boceprevir, cimetidine, colchicine, cyclosporine, digoxin, niacin, rifampin, spironolactone, telaprevir, medicine to treat an infection, or medicine to treat HIV/AIDS. Warnings While Using This Medicine: It is not safe to take this medicine during pregnancy. It could harm an unborn baby. Tell your doctor right away if you become pregnant.   Tell your doctor if you have kidney disease, diabetes, muscle pain or weakness, thyroid problems, have recently had a stroke or TIA (transient ischemic attack), or have a history of liver disease. Tell your doctor if you drink grapefruit juice or drink alcohol regularly. This medicine can cause muscle problems, which can lead to kidney problems. Tell any doctor or dentist who treats you that you use this medicine. You may need to stop using it if you have surgery, have an injury, or develop serious health problems. Your doctor will do lab tests at regular visits to check on the effects of this medicine. Keep all appointments. Keep all medicine out of the reach of children. Never share your medicine with anyone. Possible Side Effects While Using This Medicine:   Call your doctor right away if you notice any of these side effects: Allergic reaction: Itching or hives, swelling in your face or hands, swelling or tingling in your mouth or throat, chest tightness, trouble breathing  Blistering, peeling, red skin rash  Change in how much or how often you urinate  Dark urine or pale stools, nausea, vomiting, loss of appetite, stomach pain, yellow skin or eyes  Fever  Muscle pain, tenderness, or weakness  Unusual tiredness  If you notice these less serious side effects, talk with your doctor:   Diarrhea  Joint pain  If you notice other side effects that you think are caused by this medicine, tell your doctor. Call your doctor for medical advice about side effects. You may report side effects to FDA at 7-960-FDA-5447  © 2017 [facility] Information is for End User's use only and may not be sold, redistributed or otherwise used for commercial purposes. The above information is an  only. It is not intended as medical advice for individual conditions or treatments. Talk to your doctor, nurse or pharmacist before following any medical regimen to see if it is safe and effective for you.       Medicare Wellness Visit, Female     The best way to live healthy is to have a lifestyle where you eat a well-balanced diet, exercise regularly, limit alcohol use, and quit all forms of tobacco/nicotine, if applicable. Regular preventive services are another way to keep healthy. Preventive services (vaccines, screening tests, monitoring & exams) can help personalize your care plan, which helps you manage your own care. Screening tests can find health problems at the earliest stages, when they are easiest to treat. [facility] follows the current, evidence-based guidelines published by the [facility] Jayesh Broussard ([facility]) when recommending preventive services for our patients. Because we follow these guidelines, sometimes recommendations change over time as research supports it. (For example, mammograms used to be recommended annually. Even though Medicare will still pay for an annual mammogram, the newer guidelines recommend a mammogram every two years for women of average risk). Of course, you and your doctor may decide to screen more often for some diseases, based on your risk and your co-morbidities (chronic disease you are already diagnosed with). Preventive services for you include:  - Medicare offers their members a free annual wellness visit, which is time for you and your primary care provider to discuss and plan for your preventive service needs. Take advantage of this benefit every year!  -All adults over the age of 72 should receive the recommended pneumonia vaccines. Current USPSTF guidelines recommend a series of two vaccines for the best pneumonia protection.   -All adults should have a flu vaccine yearly and a tetanus vaccine every 10 years.   -All adults age 48 and older should receive the shingles vaccines (series of two vaccines).       -All adults age 38-68 who are overweight should have a diabetes screening test once every three years.   -All adults born between [facility] should be screened once for Hepatitis C.  -Other screening tests and preventive services for persons with diabetes include: an eye exam to screen for diabetic retinopathy, a kidney function test, a foot exam, and stricter control over your cholesterol.   -Cardiovascular screening for adults with routine risk involves an electrocardiogram (ECG) at intervals determined by your doctor.   -Colorectal cancer screenings should be done for adults age 54-65 with no increased risk factors for colorectal cancer. There are a number of acceptable methods of screening for this type of cancer. Each test has its own benefits and drawbacks. Discuss with your doctor what is most appropriate for you during your annual wellness visit. The different tests include: colonoscopy (considered the best screening method), a fecal occult blood test, a fecal DNA test, and sigmoidoscopy.    -A bone mass density test is recommended when a woman turns 65 to screen for osteoporosis. This test is only recommended one time, as a screening. Some providers will use this same test as a disease monitoring tool if you already have osteoporosis. -Breast cancer screenings are recommended every other year for women of normal risk, age 54-69.  -Cervical cancer screenings for women over age 72 are only recommended with certain risk factors.      Here is a list of your current Health Maintenance items (your personalized list of preventive services) with a due date:  Health Maintenance Due   Topic Date Due    DTaP/Tdap/Td  (1 - Tdap) Never done    Shingles Vaccine (1 of 2) Never done    COVID-19 Vaccine (4 - Booster for [employer] series) 03/30/2022

## 2022-08-25 NOTE — PROGRESS NOTES
Chief Complaint   Patient presents with    Establish Care       1. \"Have you been to the ER, urgent care clinic since your last visit? Hospitalized since your last visit? \" No    2. \"Have you seen or consulted any other health care providers outside of the [address] [place] [address] since your last visit? \" No     3. For patients aged 39-70: Has the patient had a colonoscopy / FIT/ Cologuard? No      If the patient is female:    4. For patients aged 41-77: Has the patient had a mammogram within the past 2 years? No      5. For patients aged 21-65: Has the patient had a pap smear?  No

## 2022-08-26 LAB
CREAT UR-MCNC: 20.8 MG/DL
MICROALBUMIN UR-MCNC: <0.5 MG/DL
MICROALBUMIN/CREAT UR-RTO: <24 MG/G (ref 0–30)

## 2022-08-30 ENCOUNTER — APPOINTMENT (OUTPATIENT)
Dept: INTERNAL MEDICINE CLINIC | Age: 77
End: 2022-08-30

## 2022-08-30 DIAGNOSIS — I10 ESSENTIAL HYPERTENSION: ICD-10-CM

## 2022-08-30 DIAGNOSIS — E55.9 VITAMIN D DEFICIENCY: ICD-10-CM

## 2022-08-31 LAB
25(OH)D3 SERPL-MCNC: 43.4 NG/ML (ref 30–100)
ALBUMIN SERPL-MCNC: 3.8 G/DL (ref 3.5–5)
ALBUMIN/GLOB SERPL: 1.2 {RATIO} (ref 1.1–2.2)
ALP SERPL-CCNC: 62 U/L (ref 45–117)
ALT SERPL-CCNC: 37 U/L (ref 12–78)
ANION GAP SERPL CALC-SCNC: 6 MMOL/L (ref 5–15)
AST SERPL-CCNC: 24 U/L (ref 15–37)
BASOPHILS # BLD: 0.1 K/UL (ref 0–0.1)
BASOPHILS NFR BLD: 1 % (ref 0–1)
BILIRUB SERPL-MCNC: 0.8 MG/DL (ref 0.2–1)
BUN SERPL-MCNC: 17 MG/DL (ref 6–20)
BUN/CREAT SERPL: 24 (ref 12–20)
CALCIUM SERPL-MCNC: 9.2 MG/DL (ref 8.5–10.1)
CHLORIDE SERPL-SCNC: 101 MMOL/L (ref 97–108)
CHOLEST SERPL-MCNC: 150 MG/DL
CO2 SERPL-SCNC: 31 MMOL/L (ref 21–32)
CREAT SERPL-MCNC: 0.71 MG/DL (ref 0.55–1.02)
DIFFERENTIAL METHOD BLD: NORMAL
EOSINOPHIL # BLD: 0.3 K/UL (ref 0–0.4)
EOSINOPHIL NFR BLD: 4 % (ref 0–7)
ERYTHROCYTE [DISTWIDTH] IN BLOOD BY AUTOMATED COUNT: 13 % (ref 11.5–14.5)
GLOBULIN SER CALC-MCNC: 3.1 G/DL (ref 2–4)
GLUCOSE SERPL-MCNC: 85 MG/DL (ref 65–100)
HCT VFR BLD AUTO: 42.5 % (ref 35–47)
HDLC SERPL-MCNC: 58 MG/DL
HDLC SERPL: 2.6 {RATIO} (ref 0–5)
HGB BLD-MCNC: 14 G/DL (ref 11.5–16)
IMM GRANULOCYTES # BLD AUTO: 0 K/UL (ref 0–0.04)
IMM GRANULOCYTES NFR BLD AUTO: 0 % (ref 0–0.5)
LDLC SERPL CALC-MCNC: 79.2 MG/DL (ref 0–100)
LYMPHOCYTES # BLD: 2.1 K/UL (ref 0.8–3.5)
LYMPHOCYTES NFR BLD: 29 % (ref 12–49)
MCH RBC QN AUTO: 32 PG (ref 26–34)
MCHC RBC AUTO-ENTMCNC: 32.9 G/DL (ref 30–36.5)
MCV RBC AUTO: 97 FL (ref 80–99)
MONOCYTES # BLD: 0.7 K/UL (ref 0–1)
MONOCYTES NFR BLD: 10 % (ref 5–13)
NEUTS SEG # BLD: 4.2 K/UL (ref 1.8–8)
NEUTS SEG NFR BLD: 56 % (ref 32–75)
NRBC # BLD: 0 K/UL (ref 0–0.01)
NRBC BLD-RTO: 0 PER 100 WBC
PLATELET # BLD AUTO: 287 K/UL (ref 150–400)
PMV BLD AUTO: 10.4 FL (ref 8.9–12.9)
POTASSIUM SERPL-SCNC: 4.4 MMOL/L (ref 3.5–5.1)
PROT SERPL-MCNC: 6.9 G/DL (ref 6.4–8.2)
RBC # BLD AUTO: 4.38 M/UL (ref 3.8–5.2)
SODIUM SERPL-SCNC: 138 MMOL/L (ref 136–145)
TRIGL SERPL-MCNC: 64 MG/DL (ref ?–150)
VLDLC SERPL CALC-MCNC: 12.8 MG/DL
WBC # BLD AUTO: 7.3 K/UL (ref 3.6–11)

## 2022-10-03 ENCOUNTER — TRANSCRIBE ORDER (OUTPATIENT)
Dept: SCHEDULING | Age: 77
End: 2022-10-03

## 2022-10-03 DIAGNOSIS — M21.952 ACQUIRED DEFORMITY OF LEFT THIGH: Primary | ICD-10-CM

## 2022-10-04 ENCOUNTER — HOSPITAL ENCOUNTER (OUTPATIENT)
Dept: CT IMAGING | Age: 77
Discharge: HOME | End: 2022-10-04
Attending: ORTHOPAEDIC SURGERY
Payer: MEDICARE

## 2022-10-04 DIAGNOSIS — M21.952 ACQUIRED DEFORMITY OF LEFT THIGH: ICD-10-CM

## 2022-10-04 PROCEDURE — 73700 CT LOWER EXTREMITY W/O DYE: CPT

## 2022-10-07 ENCOUNTER — OFFICE VISIT (OUTPATIENT)
Dept: INTERNAL MEDICINE CLINIC | Age: 77
End: 2022-10-07
Payer: MEDICARE

## 2022-10-07 VITALS
BODY MASS INDEX: 25.76 KG/M2 | HEIGHT: 65 IN | DIASTOLIC BLOOD PRESSURE: 78 MMHG | OXYGEN SATURATION: 98 % | WEIGHT: 154.6 LBS | SYSTOLIC BLOOD PRESSURE: 132 MMHG | RESPIRATION RATE: 18 BRPM | HEART RATE: 74 BPM

## 2022-10-07 DIAGNOSIS — E78.00 HYPERCHOLESTEROLEMIA: ICD-10-CM

## 2022-10-07 DIAGNOSIS — I10 ESSENTIAL HYPERTENSION: ICD-10-CM

## 2022-10-07 DIAGNOSIS — I25.10 ASCVD (ARTERIOSCLEROTIC CARDIOVASCULAR DISEASE): ICD-10-CM

## 2022-10-07 DIAGNOSIS — Q62.11 HYDRONEPHROSIS WITH URETEROPELVIC JUNCTION (UPJ) OBSTRUCTION: ICD-10-CM

## 2022-10-07 DIAGNOSIS — Z01.818 PREOPERATIVE CLEARANCE: Primary | ICD-10-CM

## 2022-10-07 PROCEDURE — 1123F ACP DISCUSS/DSCN MKR DOCD: CPT | Performed by: INTERNAL MEDICINE

## 2022-10-07 PROCEDURE — 99214 OFFICE O/P EST MOD 30 MIN: CPT | Performed by: INTERNAL MEDICINE

## 2022-10-07 NOTE — PROGRESS NOTES
Chief Complaint   Patient presents with    Pre-op Exam     Visit Vitals  /78 (BP 1 Location: Left upper arm, BP Patient Position: Sitting, BP Cuff Size: Adult)   Pulse 74   Resp 18   Ht 5' 4.5\" (1.638 m)   Wt 154 lb 9.6 oz (70.1 kg)   LMP  (LMP Unknown)   SpO2 98%   BMI 26.13 kg/m²       1. \"Have you been to the ER, urgent care clinic since your last visit? Hospitalized since your last visit? \" No    2. \"Have you seen or consulted any other health care providers outside of the [address] [place] [address] since your last visit? \" No     3. For patients aged 39-70: Has the patient had a colonoscopy / FIT/ Cologuard? No      If the patient is female:    4. For patients aged 41-77: Has the patient had a mammogram within the past 2 years? No      5. For patients aged 21-65: Has the patient had a pap smear?  No

## 2022-10-07 NOTE — PROGRESS NOTES
ICD-10-CM ICD-9-CM    1. Preoperative clearance  Z01.818 V72.84       2. Hydronephrosis with ureteropelvic junction (UPJ) obstruction  Q62.11 591 CT ABDOMEN W WO CONT AND PELVIS W CONT      3. Hypercholesterolemia  E78.00 272.0       4. Essential hypertension  I10 401.9       5. ASCVD (arteriosclerotic cardiovascular disease)  I25.10 429.2      440.9                Subjective:     Chief Complaint   Patient presents with    Pre-op Exam       Isabelle Luna is a 65 y.o. F.  She has a past medical history of breast cancer, currently in remission, and followed by oncology, as well as atherosclerosis, hypercholesterolemia, and hypertension. I reviewed and updated the medical record. This patient was seen most recently in August for routine follow-up and establishment. At that time, she was feeling generally well, with good control of her blood pressure with a combination of amlodipine, benazepril, and hydrochlorothiazide. She was still following up with her cardiologist for her history of palpitations and frequent PVCs/PACs. She had reported feeling generally well the time, without other somatic complaints other than the chronic left-sided hip pain, for which she was noted to be pending surgical correction. I had continued her on her usual medication regimen, and referred her for routine laboratory testing. Today, the patient comes in for preoperative evaluation prior to pending a left total hip arthroplasty. She reports feeling generally well overall today with the exception of left-sided chronic hip pain, but reports a concern regarding her recent CT findings. She had undergone a CT of her hip at the direction of orthopedic surgery to evaluate this further, and this had revealed osteoarthritis as expected, but also avascular necrosis of the femoral head.   However, it had also revealed ureteropelvic junction obstruction, likely chronic, with associated left-sided significant hydronephrosis, which was incompletely visualized on the CT scan. She expresses concern regarding this finding. She denies having had any fevers or chills, flank pain, hematuria, or changes in urinary habits. She denies having had any history of kidney stones previously. Of note, a CT scan of her abdomen performed in 2012 had demonstrated no such findings. Other than this, she has felt about the same as usual.  She continues to be quite active, and other than her hip pain, has had no other change in her ability to exercise. She reports being able to walk up 2 flights of stairs without cardiopulmonary limitation, and is able to walk 1/4 mile on level ground without having to stop to rest.  She denies any personal history of myocardial infarction, cerebrovascular accident, TIA, peripheral vascular disease, insulin requiring diabetes, or kidney disease. She denies having had any recent chest pain, shortness of breath, orthopnea, paroxysmal nocturnal dyspnea, or lower extremity edema. She denies any personal history of lung disease, or any other endocrinopathy. No history of bleeding or bruising sequelae. She is currently taking aspirin for primary prophylaxis. Other than her history of PVCs, she has no prior history of other arrhythmia. She reports having undergone previous surgeries without other complications. Her review of systems is otherwise negative. Routine Healthcare Maintenance issues are reviewed and discussed with the patient as noted below. Orders to update gaps in healthcare maintenance were placed as noted below in the Assessment and Plan, where applicable.      Past Medical History:  Past Medical History:   Diagnosis Date    Age-related osteoporosis without current pathological fracture 08/21/2017    RX = Prolia    Allergic rhinitis 08/21/2017    Atherosclerosis 10/2022    CT finding    Diverticulosis of large intestine without hemorrhage 08/21/2017    DJD (degenerative joint disease) 08/21/2017    GERD (gastroesophageal reflux disease) 08/21/2017    Hepatic steatosis 02/2012    CT Finding    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 Ewing     History of palpitations     Hydronephrosis of left kidney 10/2022    Hypercholesterolemia     Hypertension 08/21/2017    Dr. Jeffrey Gallegos    Internal hemorrhoid 08/21/2017    Lumbar spinal stenosis 09/20/2017    S/P radiation therapy 2009    TMJ dysfunction 04/05/2018    Vitamin D deficiency 08/21/2017       Past Surgical Histor:  Past Surgical History:   Procedure Laterality Date    HX BREAST BIOPSY Right 2008    positive ultrasound core bx    HX BREAST BIOPSY Bilateral 2008    benign mri bx    HX BREAST LUMPECTOMY Right 2009    x2    HX CATARACT REMOVAL Bilateral     HX COLONOSCOPY      HX DILATION AND CURETTAGE      HX TONSILLECTOMY         Allergies: Allergies   Allergen Reactions    Sulfa (Sulfonamide Antibiotics) Unknown (comments)       Medications:  Current Outpatient Medications   Medication Sig Dispense Refill    metoprolol succinate (TOPROL-XL) 50 mg XL tablet Take 50 mg by mouth daily. zinc 50 mg tab tablet Take 50 mg by mouth daily. Take 1/2 tabley      aspirin delayed-release 81 mg tablet Take 81 mg by mouth daily. benazepriL (LOTENSIN) 20 mg tablet Take 1 Tablet by mouth daily for 360 days. 90 Tablet 3    hydroCHLOROthiazide (HYDRODIURIL) 25 mg tablet Take 1 Tablet by mouth daily for 360 days. 90 Tablet 3    atorvastatin (LIPITOR) 10 mg tablet Take 1 Tablet by mouth nightly for 360 days. 90 Tablet 3    omeprazole (PRILOSEC) 10 mg capsule Take 10 mg by mouth daily. naproxen sod/diphenhydramine (ALEVE PM PO) Take  by mouth nightly as needed. denosumab (PROLIA) 60 mg/mL injection 60 mg by SubCUTAneous route every 6 months. MULTIVITAMIN (MULTIPLE VITAMIN PO) Take  by mouth.          Social History:  Social History     Socioeconomic History    Marital status:     Number of children: 3   Tobacco Use    Smoking status: Never    Smokeless tobacco: Never   Vaping Use    Vaping Use: Never used   Substance and Sexual Activity    Alcohol use: No       Family History:  Family History   Problem Relation Age of Onset    Breast Cancer Sister 61    COPD Mother     Stroke Father        Immunizations:  Immunization History   Administered Date(s) Administered    COVID-19, PFIZER PURPLE top, DILUTE for use, (age 15 y+), IM, 30mcg/0.3mL 03/01/2021, 03/22/2021, 11/30/2021    Influenza High Dose Vaccine PF 09/18/2018, 09/20/2019, 09/15/2020, 09/30/2021    Influenza Vaccine 10/01/2014, 10/25/2017    Pneumococcal Conjugate (PCV-13) 09/10/2015    Pneumococcal Vaccine (Unspecified Type) 10/01/2013    Tdap 06/29/2022        Healthcare Maintenance:  Health Maintenance   Topic Date Due    Shingrix Vaccine Age 50> (1 of 2) Never done    COVID-19 Vaccine (4 - Booster for [employer] series) 03/30/2022    Flu Vaccine (1) 08/01/2022    Depression Screen  08/25/2023    Medicare Yearly Exam  08/26/2023    Lipid Screen  08/30/2023    DTaP/Tdap/Td series (2 - Td or Tdap) 06/29/2032    Hepatitis C Screening  Completed    Pneumococcal 65+ years  Completed        Review of Systems:  ROS:  Review of Systems   Constitutional: Negative. HENT: Negative. Eyes: Negative. Respiratory: Negative. Cardiovascular: Negative. Gastrointestinal: Negative. Genitourinary: Negative. Musculoskeletal:  Positive for joint pain (chronic hip pain Left). Skin: Negative. Neurological: Negative. Endo/Heme/Allergies: Negative. Psychiatric/Behavioral: Negative.       ROS otherwise negative      Objective:     Vital Signs:  Visit Vitals  /78 (BP 1 Location: Left upper arm, BP Patient Position: Sitting, BP Cuff Size: Adult)   Pulse 74   Resp 18   Ht 5' 4.5\" (1.638 m)   Wt 154 lb 9.6 oz (70.1 kg)   LMP  (LMP Unknown)   SpO2 98%   BMI 26.13 kg/m²       BMI:  Body mass index is 26.13 kg/m². Physical Examination:  Physical Exam  Constitutional:       Appearance: Normal appearance. She is normal weight. HENT:      Head: Normocephalic and atraumatic. Right Ear: External ear normal.      Left Ear: External ear normal.      Nose: Nose normal.      Mouth/Throat:      Mouth: Mucous membranes are moist.      Pharynx: Oropharynx is clear. No oropharyngeal exudate or posterior oropharyngeal erythema. Cardiovascular:      Rate and Rhythm: Normal rate and regular rhythm. Pulses: Normal pulses. Heart sounds: Normal heart sounds. No murmur heard. No friction rub. No gallop. Comments: Frequent pauses / PACs  Pulmonary:      Effort: Pulmonary effort is normal. No respiratory distress. Breath sounds: Normal breath sounds. No wheezing, rhonchi or rales. Abdominal:      General: Abdomen is flat. Bowel sounds are normal. There is no distension. Palpations: Abdomen is soft. Tenderness: There is no abdominal tenderness. There is no guarding. Musculoskeletal:         General: No swelling, tenderness or deformity. Normal range of motion. Cervical back: Normal range of motion and neck supple. No rigidity or tenderness. Skin:     General: Skin is warm and dry. Findings: No erythema, lesion or rash. Neurological:      General: No focal deficit present. Mental Status: She is alert and oriented to person, place, and time. Mental status is at baseline. Sensory: No sensory deficit. Motor: No weakness.       Gait: Gait normal.      Deep Tendon Reflexes: Reflexes normal.   Psychiatric:         Mood and Affect: Mood normal.         Behavior: Behavior normal.         Judgment: Judgment normal.        Physical exam otherwise negative    Diagnostic Testing:    Laboratory Studies:  Appointment on 08/30/2022   Component Date Value Ref Range Status    Vitamin D 25-Hydroxy 08/30/2022 43.4  30 - 100 ng/mL Final    Comment: (NOTE)  Deficiency <20 ng/mL  Insufficiency          20-30 ng/mL  Sufficient             ng/mL  Possible toxicity       >100 ng/mL    The Method used is Siemens Advia Centaur currently standardized to a   Center of Disease Control and Prevention (CDC) certified reference   [facility]. Samples containing fluorescein dye can produce falsely   elevated values when tested with the ADVIA Centaur Vitamin D Assay. It is recommended that results in the toxic range, >100 ng/mL, be   retested 72 hours post fluorescein exposure. Cholesterol, total 08/30/2022 150  <200 MG/DL Final    Triglyceride 08/30/2022 64  <150 MG/DL Final    Based on NCEP-ATP III:  Triglycerides <150 mg/dL  is considered normal, 150-199 mg/dL  borderline high,  200-499 mg/dL high and  greater than or equal to 500 mg/dL very high. HDL Cholesterol 08/30/2022 58  MG/DL Final    Based on NCEP ATP III, HDL Cholesterol <40 mg/dL is considered low and >60 mg/dL is elevated.     LDL, calculated 08/30/2022 79.2  0 - 100 MG/DL Final    Comment: Based on the NCEP-ATP: LDL-C concentrations are considered  optimal <100 mg/dL,  near optimal/above Normal 100-129 mg/dL  Borderline High: 130-159, High: 160-189 mg/dL  Very High: Greater than or equal to 190 mg/dL      VLDL, calculated 08/30/2022 12.8  MG/DL Final    CHOL/HDL Ratio 08/30/2022 2.6  0.0 - 5.0   Final    Sodium 08/30/2022 138  136 - 145 mmol/L Final    Potassium 08/30/2022 4.4  3.5 - 5.1 mmol/L Final    Chloride 08/30/2022 101  97 - 108 mmol/L Final    CO2 08/30/2022 31  21 - 32 mmol/L Final    Anion gap 08/30/2022 6  5 - 15 mmol/L Final    Glucose 08/30/2022 85  65 - 100 mg/dL Final    BUN 08/30/2022 17  6 - 20 MG/DL Final    Creatinine 08/30/2022 0.71  0.55 - 1.02 MG/DL Final    BUN/Creatinine ratio 08/30/2022 24 (A)  12 - 20   Final    GFR est AA 08/30/2022 >60  >60 ml/min/1.73m2 Final    GFR est non-AA 08/30/2022 >60  >60 ml/min/1.73m2 Final    Estimated GFR is calculated using the IDMS-traceable Modification of Diet in Renal Disease (MDRD) Study equation, reported for both  Americans (GFRAA) and non- Americans (GFRNA), and normalized to 1.73m2 body surface area. The physician must decide which value applies to the patient. Calcium 08/30/2022 9.2  8.5 - 10.1 MG/DL Final    Bilirubin, total 08/30/2022 0.8  0.2 - 1.0 MG/DL Final    ALT (SGPT) 08/30/2022 37  12 - 78 U/L Final    AST (SGOT) 08/30/2022 24  15 - 37 U/L Final    Alk. phosphatase 08/30/2022 62  45 - 117 U/L Final    Protein, total 08/30/2022 6.9  6.4 - 8.2 g/dL Final    Albumin 08/30/2022 3.8  3.5 - 5.0 g/dL Final    Globulin 08/30/2022 3.1  2.0 - 4.0 g/dL Final    A-G Ratio 08/30/2022 1.2  1.1 - 2.2   Final    WBC 08/30/2022 7.3  3.6 - 11.0 K/uL Final    RBC 08/30/2022 4.38  3.80 - 5.20 M/uL Final    HGB 08/30/2022 14.0  11.5 - 16.0 g/dL Final    HCT 08/30/2022 42.5  35.0 - 47.0 % Final    MCV 08/30/2022 97.0  80.0 - 99.0 FL Final    MCH 08/30/2022 32.0  26.0 - 34.0 PG Final    MCHC 08/30/2022 32.9  30.0 - 36.5 g/dL Final    RDW 08/30/2022 13.0  11.5 - 14.5 % Final    PLATELET 13/51/5334 083  150 - 400 K/uL Final    MPV 08/30/2022 10.4  8.9 - 12.9 FL Final    NRBC 08/30/2022 0.0  0  WBC Final    ABSOLUTE NRBC 08/30/2022 0.00  0.00 - 0.01 K/uL Final    NEUTROPHILS 08/30/2022 56  32 - 75 % Final    LYMPHOCYTES 08/30/2022 29  12 - 49 % Final    MONOCYTES 08/30/2022 10  5 - 13 % Final    EOSINOPHILS 08/30/2022 4  0 - 7 % Final    BASOPHILS 08/30/2022 1  0 - 1 % Final    IMMATURE GRANULOCYTES 08/30/2022 0  0.0 - 0.5 % Final    ABS. NEUTROPHILS 08/30/2022 4.2  1.8 - 8.0 K/UL Final    ABS. LYMPHOCYTES 08/30/2022 2.1  0.8 - 3.5 K/UL Final    ABS. MONOCYTES 08/30/2022 0.7  0.0 - 1.0 K/UL Final    ABS. EOSINOPHILS 08/30/2022 0.3  0.0 - 0.4 K/UL Final    ABS. BASOPHILS 08/30/2022 0.1  0.0 - 0.1 K/UL Final    ABS. IMM.  GRANS. 08/30/2022 0.0  0.00 - 0.04 K/UL Final    DF 08/30/2022 AUTOMATED    Final   Office Visit on 08/25/2022 Component Date Value Ref Range Status    Microalbumin,urine random 08/25/2022 <0.50  MG/DL Final    No reference range has been established. Creatinine, urine 08/25/2022 20.80  mg/dL Final    No reference range has been established. Microalbumin/Creat ratio (mg/g cre* 08/25/2022 <24  0 - 30 mg/g Final         Radiographic Studies:  XR Results (most recent):  Results from Hospital Encounter encounter on 08/13/21    XR SPINE CERV PA LAT ODONT 3 V MAX    Narrative  EXAM: XR SPINE CERV PA LAT ODONT 3 V MAX    INDICATION: Pain. COMPARISON: None. FINDINGS: AP, lateral, and open mouth odontoid views of the cervical spine were  obtained. There is grade 1 2 mm anterolisthesis of C3 on C4 with otherwise  intact alignment. The vertebral body heights are preserved. There is loss of  vertical disc height mildly at the C3-4 level and moderately at the C4-5, C5-6,  and C6-7 levels with predominantly anterior marginal osteophytes. There is no  fracture. The prevertebral soft tissues are normal. The odontoid process is  intact and the C1-C2 relationship is normal.    Impression  Degenerative spine changes as above. No fracture or other acute  findings. GENTRY Results (most recent):  Results from Hospital Encounter encounter on 06/20/22    [facility] MAMMO BI SCREENING INCL CAD    Narrative  STUDY: Bilateral digital screening mammogram    INDICATION:  Screening. Right breast cancer 2009. COMPARISON: 2021 and prior. BREAST COMPOSITION:  There are scattered areas of fibroglandular density. FINDINGS: Bilateral digital screening mammography was performed and is  interpreted in conjunction with a computer assisted detection (CAD) system. No  suspicious masses or calcifications are identified. There has been no  significant change including right treatment. .    Impression  BI-RADS 2: Benign. No mammographic evidence of malignancy. RECOMMENDATIONS:  Next screening mammogram is recommended in one year.     The patient will be notified of these results. CT Results (most recent):  Results from Hospital Encounter encounter on 10/04/22    CT LOW EXT LT WO CONT    Narrative  EXAM: CT LOW EXT LT WO CONT    INDICATION: Left hip with Farshad plasty protocol    COMPARISON: CT chest abdomen and pelvis 2/20/2012    TECHNIQUE: Helical CT of the bilateral lower extremities with attention to the  left hip with coronal and sagittal reformats. Images reviewed in soft tissue and  bone windows. CT dose reduction was achieved through the use of a standardized  protocol tailored for this examination and automatic exposure control for dose  modulation. CONTRAST: None. FINDINGS: Bones: No fracture, dislocation, or periosteal reaction. Possible Left  femoral head avascular necrosis with subtle subchondral collapse    Joint fluid: Moderate left hip joint effusion. Small right hip joint effusion. Articulations: Marked facet arthropathy in the lower lumbosacral spine. Moderate  left and mild right sacroiliac joint osteoarthritis. Marked pubic symphysis  arthropathy. Severe left hip osteoarthritis. Moderate right hip osteoarthritis. Degenerative changes involving the bilateral knees are incompletely evaluated. Tendons: No definite full-thickness tendon tear. Patchy mineralization of the  bilateral hamstring origins. Muscles: No intramuscular hematoma. No focal atrophy. Soft tissues: No appreciable mass. Other: Atherosclerosis. Diverticulosis coli. Partially visualized Chronic left  ureteropelvic junction obstruction with marked hydronephrosis. Impression  1. Severe left hip osteoarthritis. Possible left femoral head avascular  necrosis with subtle subchondral collapse. 2.  Partially visualized Chronic left ureteropelvic junction obstruction with  marked hydronephrosis.      DEXA Results (most recent):  Results from [place] encounter on 02/23/22    DEXA BONE DENSITY STUDY AXIAL    Narrative  Bone Mineral Density    Indication: Monitoring, Prolia therapy. Age: 67.  Sex: Female. Menopause status: postmenopausal.. Hormone replacement therapy: No.    Number of falls in the past year: None. .  Risk factors for osteoporosis: None. .    Current medication for osteoporosis: Prolia. Comparison: 2019    Technique: Imaging was performed on the [employer]. World Health Organization  meta-analysis fracture risk calculator (FRAX) analysis was performed for 10 year  fracture risk probability assessment    Excluded sites: Lumbar spine due to degenerative changes    Findings:        Femoral Neck Right: . Bone mineral density (gm/cm2): 0.845.  % of peak bone mass: 81.  % for age matched controls:  110. T-score: -1.4.  Z-score: 0.6. Total Hip Left: . Bone mineral density (gm/cm2): 0.865.  % of peak bone mass: 83.  % for age matched controls:  80. T-score: -1.1. Z-score: 0.7.    33% Radius Left: . Bone mineral density (gm/cm2): 0.538.  % of peak bone mass: 61.  % for age matched controls:  80.  T-score: -3.9.  Z-score: -1.5. Impression  Impression: This patient is osteoporotic using the World Health Organization criteria  As compared to the prior study, there has been a statistically significant  increase in bone mineral density. 10 year probability of major osteoporotic fracture: 12.3%. 10 year probability of hip fracture: 2.6%. Recommendations:  Therapy recommendations need to be tailored to each individual patient. Using  the [facility] FRAX absolute fracture algorithm, the  [address] [place] [address] recommends beginning pharmacological therapy in  postmenopausal women and men over the age of 48 with a 8 year probability of a  hip fracture of >3% OR with the 10 year probability of a major osteoporotic  fracture of >20%. Please reconsider testing based on risk factors.  Currently, Medicare will only  reimburse for a central DXA examination every two years, unless the patient is  on chronic glucocorticoid therapy. Note: Please note that reliable, valid comparisons cannot be made between  studies which have been performed on machines from different manufacturers. If  clinically warranted, a follow up study performed at this site, on the same  unit, would allow the most sensitive assessment of change in bone mineral  density. MRI Results (most recent):  Results from [place] encounter on 08/18/21    MRI CERV SPINE WO CONT    Narrative  EXAM: MRI CERV SPINE WO CONT    INDICATION: Neck pain; Chronic neck pain duration >= 3 months; Chronic neck  pain, no complicating feature; Worsening or not improving; Adequate conservative  therapy; No known/automatically detected potential contraindications to MRI. Other cervical disc degeneration, unspecified cervical region / Right neck pain,  DDD, spondylosis    COMPARISON: Cervical spine radiographs 8/13/2021    TECHNIQUE: MR imaging of the cervical spine was performed using the following  sequences: sagittal T1, T2, STIR;  axial T2, T1.    CONTRAST:  None. FINDINGS:    Stepwise mild anterolisthesis from C3-C5. Reversal of the normal cervical  lordosis centered at about C4-C5. Vertebral body heights are maintained. Multilevel degenerative endplate osteophytes. Degenerative endplate marrow edema  at C5-C6. Fibrofatty endplate changes from H2-O5. Marked atlantodental  arthropathy    The craniocervical junction is intact. The course and signal intensity of the  spinal cord are normal.    Paraspinal muscle atrophy. Patchy biapical pleural-parenchymal scarring    C2-C3: No stenosis    C3-C4: Anterolisthesis. Disc pseudobulge. Left uncovertebral hypertrophy. Bilateral facet arthropathy. Ligament of flavum thickening. No significant  spinal stenosis. Severe left and mild right foraminal stenosis    C4-C5: Anterolisthesis. Disc pseudobulge with superimposed central disc  extrusion indenting the ventral spinal cord. Facet arthropathy. Ligamentum  flavum thickening. Mild-to-moderate spinal stenosis. Moderate bilateral  foraminal stenosis    C5-C6: Disc bulge. Uncovertebral hypertrophy. Facet arthropathy. Ligament flavum  thickening. Moderate spinal stenosis. Severe bilateral foraminal stenosis    C6-C7: Disc bulge. Uncovertebral hypertrophy. Ligament flavum thickening. Mild  spinal stenosis. Moderate left and mild right foraminal stenosis    C7-T1: No stenosis    Impression  1. Multilevel degenerative changes, disc disease, and listhesis on a background  of kyphosis. Multilevel mild to moderate spinal stenosis and varying degrees of  foraminal stenosis, worst at C5-C6. 2.  Central disc extrusion indenting the ventral spinal cord at C4-C5. Assessment/Plan:       ICD-10-CM ICD-9-CM    1. Preoperative clearance  Z01.818 V72.84       2. Hydronephrosis with ureteropelvic junction (UPJ) obstruction  Q62.11 591 CT ABDOMEN W WO CONT AND PELVIS W CONT      3. Hypercholesterolemia  E78.00 272.0       4. Essential hypertension  I10 401.9       5. ASCVD (arteriosclerotic cardiovascular disease)  I25.10 429.2      440.9                  Pre-Operative Risk Assessment Using 2014 ACC/AHA Guidelines     Emergent procedure: No  Active Cardiac Condition including decompensated HF, Arrhythmia, MI <3 weeks, severe valve disease: No  Risk Level of Procedure: Intermediate Risk (intraperitoneal, intrathoracic, HENT, orthopedic, or carotid endarterectomy, etc.)  Revised Cardiac Risk Index Risk factors: Total Score: 0  Measurement of Exercise Tolerance before Surgery >4 METS (climbing > 1 flight of stairs without stopping, walking up hill > 1-2 blocks, scrubbing floors, moving furniture, golf, bowling, dancing or tennis, or running short distance):  Yes    According to the 2014 ACC/AHA pre-operative risk assessment guidelines Bari Guillen is at low risk for major cardiac complications during a Intermediate Risk procedure, exercise tolerance is >4 METS  and procedure may proceed as planned. Specific medication recommendations are listed below. Request further recommendations from consultants: Cardiology    Medication Recommendations (taken with a sip of water even when NPO):  ACEI/ARB Continue the day of surgery  Betablocker Continue the day of surgery  Diuretics Hold one dose prior to surgery  Statins Continue the day of surgery  ASA should be HELD 7 days prior to surgery and restarted with the postoperative diet     Hydronephrosis:   -As noted on recent CT extremity, incidental finding. Unclear etiology. Differential would include nephrolithiasis, which seems unlikely, less likely malignancy, or other anatomical obstruction. Checking CT urogram.  She will be getting a urinalysis with her additional preoperative evaluation. Advised patient that depending on findings additional referral to urology might become necessary. Note that she is without CVA tenderness or other exam findings today. Return precautions provided the patient. She endorses agreement and understanding. ASCVD:   - Type: ASCVD   - Current Symptoms: No Symptoms, no angina   - History and Contributing Factors: elevated cholesterol, hypertension, and known history of coronary artery disease  Key CAD CHF Meds               metoprolol succinate (TOPROL-XL) 50 mg XL tablet (Taking) Take 50 mg by mouth daily. aspirin delayed-release 81 mg tablet (Taking) Take 81 mg by mouth daily. benazepriL (LOTENSIN) 20 mg tablet (Taking) Take 1 Tablet by mouth daily for 360 days. hydroCHLOROthiazide (HYDRODIURIL) 25 mg tablet (Taking) Take 1 Tablet by mouth daily for 360 days. atorvastatin (LIPITOR) 10 mg tablet (Taking) Take 1 Tablet by mouth nightly for 360 days. - Target BP: < 130/80 mmHg; see Blood Pressure section   - Statin? YES   - Antiplatelet and/or Anticoagulant? YES   - ACEI/ARB? YES   - Beta Blocker?  YES   - Notes: [facility]    Essential Hypertension/Blood Pressure Management:   - Home BP Readings: usual 130/80   - Current Control: optimal   - Target BP: <130/80 mmHg   - Relevant BP Meds:  Key CAD CHF Meds               metoprolol succinate (TOPROL-XL) 50 mg XL tablet (Taking) Take 50 mg by mouth daily. aspirin delayed-release 81 mg tablet (Taking) Take 81 mg by mouth daily. benazepriL (LOTENSIN) 20 mg tablet (Taking) Take 1 Tablet by mouth daily for 360 days. hydroCHLOROthiazide (HYDRODIURIL) 25 mg tablet (Taking) Take 1 Tablet by mouth daily for 360 days. atorvastatin (LIPITOR) 10 mg tablet (Taking) Take 1 Tablet by mouth nightly for 360 days.                 - Plan: continue current treatment regimen, continue current meds, dietary sodium restriction, regular aerobic exercise   - Notes: [facility]    Hyperlipidemia/Dyslipidemia:   - Summary of Cardiovascular Risks and Goals:     LDL goal is under 80  hypertension  hyperlipidemia   -   Lab Results   Component Value Date/Time    LDL, calculated 79.2 08/30/2022 09:38 AM    HDL Cholesterol 58 08/30/2022 09:38 AM       - Relevant Cholesterol Meds:  Key Antihyperlipidemia Meds               atorvastatin (LIPITOR) 10 mg tablet (Taking) Take 1 Tablet by mouth nightly for 360 days. - Cholesterol at target: yes   - Does patient meet USPSTF and ACC/AHA indications for pharmacotherapy (e.g., statin): yes   - GI symptoms with meds: NO   - Muscle aches with meds: NO   - Other Adverse effects with meds: NO   - Medication Plan: continue   - Notes: --        I have reviewed the patient's medical history in detail and updated the computerized patient record. We had a prolonged discussion about these complex clinical issues and went over the various important aspects to consider. All questions were answered.       Advised the patient to call back or return to office if symptoms do not improve, change in nature, or persist.     The patient was given an after visit summary or informed of [employer] which includes patient instructions, diagnoses, current medications, & vitals. she expressed understanding with the diagnosis and plan. Génesis Doran MD    Please note that this dictation was completed with Tyto, the computer voice recognition software. Quite often unanticipated grammatical, syntax, homophones, and other interpretive errors are inadvertently transcribed by the computer software. Please disregard these errors. Please excuse any errors that have escaped final proofreading. Follow-up and Dispositions    Return in about 6 months (around 4/7/2023).

## 2022-10-10 ENCOUNTER — HOSPITAL ENCOUNTER (OUTPATIENT)
Dept: PREADMISSION TESTING | Age: 77
Discharge: HOME | End: 2022-10-10
Attending: COLON & RECTAL SURGERY
Payer: MEDICARE

## 2022-10-10 VITALS
RESPIRATION RATE: 16 BRPM | HEART RATE: 61 BPM | HEIGHT: 64 IN | WEIGHT: 154.54 LBS | TEMPERATURE: 97.5 F | BODY MASS INDEX: 26.38 KG/M2 | SYSTOLIC BLOOD PRESSURE: 155 MMHG | OXYGEN SATURATION: 99 % | DIASTOLIC BLOOD PRESSURE: 64 MMHG

## 2022-10-10 LAB
ABO + RH BLD: NORMAL
ALBUMIN SERPL-MCNC: 3.6 G/DL (ref 3.5–5)
ALBUMIN/GLOB SERPL: 0.9 {RATIO} (ref 1.1–2.2)
ALP SERPL-CCNC: 65 U/L (ref 45–117)
ALT SERPL-CCNC: 36 U/L (ref 12–78)
ANION GAP SERPL CALC-SCNC: 4 MMOL/L (ref 5–15)
APPEARANCE UR: CLEAR
AST SERPL-CCNC: 20 U/L (ref 15–37)
BACTERIA URNS QL MICRO: NEGATIVE /HPF
BILIRUB SERPL-MCNC: 0.8 MG/DL (ref 0.2–1)
BILIRUB UR QL: NEGATIVE
BLOOD GROUP ANTIBODIES SERPL: NORMAL
BUN SERPL-MCNC: 17 MG/DL (ref 6–20)
BUN/CREAT SERPL: 22 (ref 12–20)
CALCIUM SERPL-MCNC: 9.6 MG/DL (ref 8.5–10.1)
CHLORIDE SERPL-SCNC: 102 MMOL/L (ref 97–108)
CO2 SERPL-SCNC: 32 MMOL/L (ref 21–32)
COLOR UR: ABNORMAL
CREAT SERPL-MCNC: 0.77 MG/DL (ref 0.55–1.02)
EPITH CASTS URNS QL MICRO: ABNORMAL /LPF
ERYTHROCYTE [DISTWIDTH] IN BLOOD BY AUTOMATED COUNT: 12.3 % (ref 11.5–14.5)
EST. AVERAGE GLUCOSE BLD GHB EST-MCNC: 123 MG/DL
GLOBULIN SER CALC-MCNC: 3.8 G/DL (ref 2–4)
GLUCOSE SERPL-MCNC: 88 MG/DL (ref 65–100)
GLUCOSE UR STRIP.AUTO-MCNC: NEGATIVE MG/DL
HBA1C MFR BLD: 5.9 % (ref 4–5.6)
HCT VFR BLD AUTO: 42.3 % (ref 35–47)
HGB BLD-MCNC: 14 G/DL (ref 11.5–16)
HGB UR QL STRIP: NEGATIVE
HYALINE CASTS URNS QL MICRO: ABNORMAL /LPF (ref 0–2)
INR PPP: 1 (ref 0.9–1.1)
KETONES UR QL STRIP.AUTO: NEGATIVE MG/DL
LEUKOCYTE ESTERASE UR QL STRIP.AUTO: ABNORMAL
MCH RBC QN AUTO: 31.6 PG (ref 26–34)
MCHC RBC AUTO-ENTMCNC: 33.1 G/DL (ref 30–36.5)
MCV RBC AUTO: 95.5 FL (ref 80–99)
NITRITE UR QL STRIP.AUTO: NEGATIVE
NRBC # BLD: 0 K/UL (ref 0–0.01)
NRBC BLD-RTO: 0 PER 100 WBC
PH UR STRIP: 6 [PH] (ref 5–8)
PLATELET # BLD AUTO: 285 K/UL (ref 150–400)
PMV BLD AUTO: 10.2 FL (ref 8.9–12.9)
POTASSIUM SERPL-SCNC: 4 MMOL/L (ref 3.5–5.1)
PROT SERPL-MCNC: 7.4 G/DL (ref 6.4–8.2)
PROT UR STRIP-MCNC: NEGATIVE MG/DL
PROTHROMBIN TIME: 10.6 SEC (ref 9–11.1)
RBC # BLD AUTO: 4.43 M/UL (ref 3.8–5.2)
RBC #/AREA URNS HPF: ABNORMAL /HPF (ref 0–5)
SODIUM SERPL-SCNC: 138 MMOL/L (ref 136–145)
SP GR UR REFRACTOMETRY: 1.01
SPECIMEN EXP DATE BLD: NORMAL
UA: UC IF INDICATED,UAUC: ABNORMAL
UROBILINOGEN UR QL STRIP.AUTO: 0.2 EU/DL (ref 0.2–1)
WBC # BLD AUTO: 6.9 K/UL (ref 3.6–11)
WBC URNS QL MICRO: ABNORMAL /HPF (ref 0–4)

## 2022-10-10 PROCEDURE — 85027 COMPLETE CBC AUTOMATED: CPT

## 2022-10-10 PROCEDURE — 81001 URINALYSIS AUTO W/SCOPE: CPT

## 2022-10-10 PROCEDURE — 86900 BLOOD TYPING SEROLOGIC ABO: CPT

## 2022-10-10 PROCEDURE — 83036 HEMOGLOBIN GLYCOSYLATED A1C: CPT

## 2022-10-10 PROCEDURE — 36415 COLL VENOUS BLD VENIPUNCTURE: CPT

## 2022-10-10 PROCEDURE — 80053 COMPREHEN METABOLIC PANEL: CPT

## 2022-10-10 PROCEDURE — 85610 PROTHROMBIN TIME: CPT

## 2022-10-10 RX ORDER — OMEPRAZOLE 20 MG/1
20 CAPSULE, DELAYED RELEASE ORAL DAILY
COMMUNITY

## 2022-10-10 RX ORDER — CELECOXIB 200 MG/1
200 CAPSULE ORAL ONCE
Status: CANCELLED | OUTPATIENT
Start: 2022-10-14 | End: 2022-10-14

## 2022-10-10 NOTE — PERIOP NOTES
[facility]  Joint/Spine Preoperative Instructions        Surgery Date 10/14/22          Time of Arrival to be called at [phone]    1. On the day of your surgery, please report to the Surgical Services Registration Desk and sign in at your designated time. The Surgery Center is located to the right of the Emergency Room. 2. You must have someone with you to drive you home. You should not drive a car for 24 hours following surgery. Please make arrangements for a friend or family member to stay with you for the first 24 hours after your surgery. 3. No food after midnight. Medications morning of surgery should be taken with a sip of water. Please follow pre-surgery drink instructions that were given at your Pre Admission Testing appointment. 4. We recommend you do not drink any alcoholic beverages for 24 hours before and after your surgery. 5. Contact your surgeons office for instructions on the following medications: non-steroidal anti-inflammatory drugs (i.e. Advil, Aleve), vitamins, and supplements. (Some surgeons will want you to stop these medications prior to surgery and others may allow you to take them)  **If you are currently taking Plavix, Coumadin, Aspirin and/or other blood-thinning agents, contact your surgeon for instructions. ** Your surgeon will partner with the physician prescribing these medications to determine if it is safe to stop or if you need to continue taking. Please do not stop taking these medications without instructions from your surgeon    6. Wear comfortable clothes. Wear glasses instead of contacts. Do not bring any money or jewelry. Please bring picture ID, insurance card, and any prearranged co-payment or hospital payment. Do not wear make-up, particularly mascara the morning of your surgery. Do not wear nail polish, particularly if you are having foot /hand surgery.   Wear your hair loose or down, no ponytails, buns, radha pins or clips. All body piercings must be removed. Please shower with antibacterial soap for three consecutive days before and on the morning of surgery, but do not apply any lotions, powders or deodorants after the shower on the day of surgery. Please use a fresh towels after each shower. Please sleep in clean clothes and change bed linens the night before surgery. Please do not shave for 48 hours prior to surgery. Shaving of the face is acceptable. 7. You should understand that if you do not follow these instructions your surgery may be cancelled. If your physical condition changes (I.e. fever, cold or flu) please contact your surgeon as soon as possible. 8. It is important that you be on time. If a situation occurs where you may be late, please call [phone] (OR Holding Area). 9. If you have any questions and or problems, please call [phone] (Pre-admission Testing). 10. Your surgery time may be subject to change. You will receive a phone call the evening prior with your time of arrival.    11.  If having outpatient surgery, you must have someone to drive you here, stay with you during the duration of your stay, and to drive you home at time of discharge. 12. The following link is for the educational video for patients and/or families. http://marr-[facility].org/. com/[email or web address]-ybwrqqo-fvlzyzq/[place]/[email or web address]-materials    13  Due to current COVID restrictions, only two adults may accompany you the day of the procedure. We have limited seating available. If our waiting room is at capacity, your ride may be asked to remain in their vehicle. No children are allowed in the waiting room    Special Instructions: Discontinue vitamins and supplements, aleve, and aspirin 5 days prior to your surgery per Dr. Vesta Abraham instructions. TAKE ALL MEDICATIONS THE DAY OF SURGERY EXCEPT: Vitamins and supplements, aspirin, aleve.  May take other medications with a sip of water. I understand a pre-operative phone call will be made to verify my surgery time. In the event that I am not available, I give permission for a message to be left on my answering service and/or with another person?   yes         ___________________      __________   _________    (Signature of Patient)             (Witness)                (Date and Time)

## 2022-10-10 NOTE — ADVANCED PRACTICE NURSE
PAT Nurse Practitioner   Pre-Operative Chart Review/Assessment:-ORTHOPEDIC/NEUROSURGICAL SPINE                Patient Name:  Sandi Church                                                           Age:   65 y.o.    :  1945     Today's Date:  10/11/2022     Date of PAT:   10/10/22      Date of Surgery:    10/14/2022      Procedure(s):  Left  Total Hip Arthroplasty     Surgeon:   Charles Bahena     Medical Clearance:  Dr. Arnel Orozco:      1)  Cardiac Clearance:  Dr. Licha Lin 10/11/22       2)  Program for Diabetes Health Consult:  Not indicated-A1C 5.9      3)  Sleep Apnea evaluation:   STOP BANG Score 2;  Snoring-denies, Apnea-denies               4) Treatment for MRSA/Staph Aureus:  Negative       5) Additional Concerns:  PVCs, HTN, heart murmur, osteoporosis, hx of breast CA (right) s/p lumpectomy and radiation                Vital Signs:         Visit Vitals  BP (!) 155/64 (BP 1 Location: Right upper arm, BP Patient Position: At rest;Sitting)   Pulse 61   Temp 97.5 °F (36.4 °C)   Resp 16   Ht 5' 3.5\" (1.613 m)   Wt 70.1 kg (154 lb 8.7 oz)   LMP  (LMP Unknown)   SpO2 99%   BMI 26.95 kg/m²                        ____________________________________________  PAST MEDICAL HISTORY  Past Medical History:   Diagnosis Date    Age-related osteoporosis without current pathological fracture 2017    RX = Prolia    Allergic rhinitis 2017    Atherosclerosis 10/2022    CT finding    Cancer ([address] 2008    Chronic pain     Diverticulosis of large intestine without hemorrhage 2017    DJD (degenerative joint disease) 2017    GERD (gastroesophageal reflux disease) 2017    Hepatic steatosis 2012    CT Finding    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 Cesar Farah    History of palpitations     Hydronephrosis of left kidney 10/2022    Hypercholesterolemia     Hypertension 2017 Dr. Marya Brunner    Internal hemorrhoid 08/21/2017    Lumbar spinal stenosis 09/20/2017    S/P radiation therapy 2009    TMJ dysfunction 04/05/2018    Vitamin D deficiency 08/21/2017      ____________________________________________  PAST SURGICAL HISTORY  Past Surgical History:   Procedure Laterality Date    HX BREAST BIOPSY Right 2008    positive ultrasound core bx    HX BREAST BIOPSY Bilateral 2008    benign mri bx    HX BREAST LUMPECTOMY Right 2009    x2    HX BUNIONECTOMY Bilateral     HX CATARACT REMOVAL Bilateral 2020    HX COLONOSCOPY      HX DILATION AND CURETTAGE      HX TONSILLECTOMY      HX TUBAL LIGATION  1980      ____________________________________________  HOME MEDICATIONS    Current Outpatient Medications   Medication Sig    omeprazole (PRILOSEC) 20 mg capsule Take 20 mg by mouth daily. metoprolol succinate (TOPROL-XL) 50 mg XL tablet Take 50 mg by mouth daily. zinc 50 mg tab tablet Take 25 mg by mouth daily. Take 1/2 tablet    aspirin delayed-release 81 mg tablet Take 81 mg by mouth daily. benazepriL (LOTENSIN) 20 mg tablet Take 1 Tablet by mouth daily for 360 days. hydroCHLOROthiazide (HYDRODIURIL) 25 mg tablet Take 1 Tablet by mouth daily for 360 days. atorvastatin (LIPITOR) 10 mg tablet Take 1 Tablet by mouth nightly for 360 days. naproxen sod/diphenhydramine (ALEVE PM PO) Take  by mouth nightly as needed. denosumab (PROLIA) 60 mg/mL injection 60 mg by SubCUTAneous route every 6 months. MULTIVITAMIN (MULTIPLE VITAMIN PO) Take 1 Tablet by mouth daily.      No current facility-administered medications for this encounter.      ____________________________________________  ALLERGIES  Allergies   Allergen Reactions    Nitrous Oxide Vertigo    Sulfa (Sulfonamide Antibiotics) Nausea Only      ____________________________________________  SOCIAL HISTORY  Social History     Tobacco Use    Smoking status: Never    Smokeless tobacco: Never   Substance Use Topics    Alcohol use: No      ____________________________________________  COVID VACCINATION STATUS:      Internal Administration   First Dose COVID-19, PFIZER PURPLE top, DILUTE for use, (age 15 y+), IM, 30mcg/0.3mL  03/01/2021   Second Dose COVID-19, PFIZER PURPLE top, DILUTE for use, (age 15 y+), IM, 30mcg/0.3mL  03/22/2021      Last COVID Lab No results found for: Subhash Cece, RCV2CT, CVD2M, COV2, XPLCVT, [address] [facility], [address] [place] [address], [address], [address] [employer] [place]                   Labs:     Hospital Outpatient Visit on 10/10/2022   Component Date Value Ref Range Status    WBC 10/10/2022 6.9  3.6 - 11.0 K/uL Final    RBC 10/10/2022 4.43  3.80 - 5.20 M/uL Final    HGB 10/10/2022 14.0  11.5 - 16.0 g/dL Final    HCT 10/10/2022 42.3  35.0 - 47.0 % Final    MCV 10/10/2022 95.5  80.0 - 99.0 FL Final    MCH 10/10/2022 31.6  26.0 - 34.0 PG Final    MCHC 10/10/2022 33.1  30.0 - 36.5 g/dL Final    RDW 10/10/2022 12.3  11.5 - 14.5 % Final    PLATELET 09/79/2095 180  150 - 400 K/uL Final    MPV 10/10/2022 10.2  8.9 - 12.9 FL Final    NRBC 10/10/2022 0.0  0  WBC Final    ABSOLUTE NRBC 10/10/2022 0.00  0.00 - 0.01 K/uL Final    INR 10/10/2022 1.0  0.9 - 1.1   Final    A single therapeutic range for Vit K antagonists may not be optimal for all indications - see June, 2008 issue of Chest, American College of Chest Physicians Evidence-Based Clinical Practice Guidelines, 8th Edition.     Prothrombin time 10/10/2022 10.6  9.0 - 11.1 sec Final    Color 10/10/2022 YELLOW/STRAW    Final    Color Reference Range: Straw, Yellow or Dark Yellow    Appearance 10/10/2022 CLEAR  CLEAR   Final    Specific gravity 10/10/2022 1.009    Final    pH (UA) 10/10/2022 6.0  5.0 - 8.0   Final    Protein 10/10/2022 Negative  NEG mg/dL Final    Glucose 10/10/2022 Negative  NEG mg/dL Final    Ketone 10/10/2022 Negative  NEG mg/dL Final    Bilirubin 10/10/2022 Negative  NEG   Final    Blood 10/10/2022 Negative  NEG   Final    Urobilinogen 10/10/2022 0.2  0.2 - 1.0 EU/dL Final    Nitrites 10/10/2022 Negative  NEG   Final    Leukocyte Esterase 10/10/2022 TRACE (A)  NEG   Final    UA:UC IF INDICATED 10/10/2022 CULTURE NOT INDICATED BY UA RESULT    Final    WBC 10/10/2022 0-4  0 - 4 /hpf Final    RBC 10/10/2022 0-5  0 - 5 /hpf Final    Epithelial cells 10/10/2022 FEW  FEW /lpf Final    Epithelial cell category consists of squamous cells and /or transitional urothelial cells. Renal tubular cells, if present, are separately identified as such. Bacteria 10/10/2022 Negative  NEG /hpf Final    Hyaline cast 10/10/2022 0-2  0 - 2 /lpf Final    Hemoglobin A1c 10/10/2022 5.9 (A)  4.0 - 5.6 % Final    Comment: NEW METHOD  PLEASE NOTE NEW REFERENCE RANGE  (NOTE)  HbA1C Interpretive Ranges  <5.7              Normal  5.7 - 6.4         Consider Prediabetes  >6.5              Consider Diabetes      Est. average glucose 10/10/2022 123  mg/dL Final    Special Requests: 10/10/2022 NO SPECIAL REQUESTS    Final    Culture result: 10/10/2022 MRSA NOT PRESENT    Final    Culture result: 10/10/2022     Final                    Value:Screening of patient nares for MRSA is for surveillance purposes and, if positive, to facilitate isolation considerations in high risk settings. It is not intended for automatic decolonization interventions per se as regimens are not sufficiently effective to warrant routine use.       Sodium 10/10/2022 138  136 - 145 mmol/L Final    Potassium 10/10/2022 4.0  3.5 - 5.1 mmol/L Final    Chloride 10/10/2022 102  97 - 108 mmol/L Final    CO2 10/10/2022 32  21 - 32 mmol/L Final    Anion gap 10/10/2022 4 (A)  5 - 15 mmol/L Final    Glucose 10/10/2022 88  65 - 100 mg/dL Final    BUN 10/10/2022 17  6 - 20 MG/DL Final    Creatinine 10/10/2022 0.77  0.55 - 1.02 MG/DL Final    BUN/Creatinine ratio 10/10/2022 22 (A)  12 - 20   Final    eGFR 10/10/2022 >60  >60 ml/min/1.73m2 Final    Comment:      Pediatric calculator link: [employer].at. org/professionals/kdoqi/gfr_calculatorped       Effective Oct 3, 2022       These results are not intended for use in patients <25years of age. eGFR results are calculated without a race factor using  the 2021 CKD-EPI equation. Careful clinical correlation is recommended, particularly when comparing to results calculated using previous equations. The CKD-EPI equation is less accurate in patients with extremes of muscle mass, extra-renal metabolism of creatinine, excessive creatine ingestion, or following therapy that affects renal tubular secretion. Calcium 10/10/2022 9.6  8.5 - 10.1 MG/DL Final    Bilirubin, total 10/10/2022 0.8  0.2 - 1.0 MG/DL Final    ALT (SGPT) 10/10/2022 36  12 - 78 U/L Final    AST (SGOT) 10/10/2022 20  15 - 37 U/L Final    Alk. phosphatase 10/10/2022 65  45 - 117 U/L Final    Protein, total 10/10/2022 7.4  6.4 - 8.2 g/dL Final    Albumin 10/10/2022 3.6  3.5 - 5.0 g/dL Final    Globulin 10/10/2022 3.8  2.0 - 4.0 g/dL Final    A-G Ratio 10/10/2022 0.9 (A)  1.1 - 2.2   Final    Crossmatch Expiration 10/10/2022 10/17/2022,2359   Final    ABO/Rh(D) 10/10/2022 O NEGATIVE   Final    Antibody screen 10/10/2022 NEG   Final        XR Results (most recent):    Results from Hospital Encounter encounter on 08/13/21    XR SPINE CERV PA LAT ODONT 3 V MAX    Narrative  EXAM: XR SPINE CERV PA LAT ODONT 3 V MAX    INDICATION: Pain. COMPARISON: None. FINDINGS: AP, lateral, and open mouth odontoid views of the cervical spine were  obtained. There is grade 1 2 mm anterolisthesis of C3 on C4 with otherwise  intact alignment. The vertebral body heights are preserved. There is loss of  vertical disc height mildly at the C3-4 level and moderately at the C4-5, C5-6,  and C6-7 levels with predominantly anterior marginal osteophytes. There is no  fracture.  The prevertebral soft tissues are normal. The odontoid process is  intact and the C1-C2 relationship is normal.    Impression  Degenerative spine changes as above. No fracture or other acute  findings. Skin:   Denies open wounds, cuts, sores, rashes or other areas of concern in PAT assessment.         Mina Body, NP

## 2022-10-10 NOTE — PERIOP NOTES
The [address] \"Your Path to a More Active Life\" orthopedic total knee or total hip educational video and the [facility] patient handbook provided & reviewed during the patients pre-admission testing (PAT) appointment. An opportunity for questions was provided, patient verbalized understanding. EKG not done during PAT appointment. Last EKG done on 4/14/22 with cardiologist.    Pt had 6 month follow up appointment with Dr. Meli Severino on 10/24. Pt had concerns about making the appointment following her surgery. Called Dr. Rudy Giles office to see if the appointment could be rescheduled before upcoming surgery.  Was able to make appointment for pt for tomorrow 10/11 with Madiha Rabago NP.

## 2022-10-10 NOTE — PERIOP NOTES

## 2022-10-10 NOTE — PERIOP NOTES

## 2022-10-11 LAB
BACTERIA SPEC CULT: NORMAL
BACTERIA SPEC CULT: NORMAL
SERVICE CMNT-IMP: NORMAL

## 2022-10-14 ENCOUNTER — HOSPITAL ENCOUNTER (INPATIENT)
Age: 77
LOS: 1 days | Discharge: HOME HEALTH | DRG: 470 | End: 2022-10-14
Attending: ORTHOPAEDIC SURGERY | Admitting: ORTHOPAEDIC SURGERY
Payer: MEDICARE

## 2022-10-14 ENCOUNTER — ANESTHESIA (OUTPATIENT)
Dept: SURGERY | Age: 77
DRG: 470 | End: 2022-10-14
Payer: MEDICARE

## 2022-10-14 ENCOUNTER — APPOINTMENT (OUTPATIENT)
Dept: GENERAL RADIOLOGY | Age: 77
DRG: 470 | End: 2022-10-14
Attending: ORTHOPAEDIC SURGERY
Payer: MEDICARE

## 2022-10-14 ENCOUNTER — ANESTHESIA EVENT (OUTPATIENT)
Dept: SURGERY | Age: 77
DRG: 470 | End: 2022-10-14
Payer: MEDICARE

## 2022-10-14 VITALS
WEIGHT: 153.44 LBS | OXYGEN SATURATION: 98 % | SYSTOLIC BLOOD PRESSURE: 131 MMHG | HEIGHT: 63 IN | RESPIRATION RATE: 16 BRPM | HEART RATE: 77 BPM | DIASTOLIC BLOOD PRESSURE: 61 MMHG | BODY MASS INDEX: 27.19 KG/M2 | TEMPERATURE: 97.8 F

## 2022-10-14 DIAGNOSIS — Z96.642 STATUS POST TOTAL REPLACEMENT OF LEFT HIP: Primary | ICD-10-CM

## 2022-10-14 PROBLEM — Z96.649 S/P TOTAL HIP ARTHROPLASTY: Status: ACTIVE | Noted: 2022-10-14

## 2022-10-14 PROCEDURE — 2709999900 HC NON-CHARGEABLE SUPPLY: Performed by: ORTHOPAEDIC SURGERY

## 2022-10-14 PROCEDURE — 74011250636 HC RX REV CODE- 250/636: Performed by: NURSE ANESTHETIST, CERTIFIED REGISTERED

## 2022-10-14 PROCEDURE — 97535 SELF CARE MNGMENT TRAINING: CPT | Performed by: OCCUPATIONAL THERAPIST

## 2022-10-14 PROCEDURE — 74011000250 HC RX REV CODE- 250: Performed by: NURSE ANESTHETIST, CERTIFIED REGISTERED

## 2022-10-14 PROCEDURE — 65270000029 HC RM PRIVATE

## 2022-10-14 PROCEDURE — 74011000250 HC RX REV CODE- 250: Performed by: ANESTHESIOLOGY

## 2022-10-14 PROCEDURE — 77030002933 HC SUT MCRYL J&J -A: Performed by: ORTHOPAEDIC SURGERY

## 2022-10-14 PROCEDURE — 74011000250 HC RX REV CODE- 250: Performed by: ORTHOPAEDIC SURGERY

## 2022-10-14 PROCEDURE — 77030020788: Performed by: ORTHOPAEDIC SURGERY

## 2022-10-14 PROCEDURE — 77030014125 HC TY EPDRL BBMI -B: Performed by: ANESTHESIOLOGY

## 2022-10-14 PROCEDURE — C1776 JOINT DEVICE (IMPLANTABLE): HCPCS | Performed by: ORTHOPAEDIC SURGERY

## 2022-10-14 PROCEDURE — 77030029829 HC TIB INST CKPNT DISP STRY -B: Performed by: ORTHOPAEDIC SURGERY

## 2022-10-14 PROCEDURE — 77030006835 HC BLD SAW SAG STRY -B: Performed by: ORTHOPAEDIC SURGERY

## 2022-10-14 PROCEDURE — 77030011264 HC ELECTRD BLD EXT COVD -A: Performed by: ORTHOPAEDIC SURGERY

## 2022-10-14 PROCEDURE — 0SRB03Z REPLACEMENT OF LEFT HIP JOINT WITH CERAMIC SYNTHETIC SUBSTITUTE, OPEN APPROACH: ICD-10-PCS | Performed by: ORTHOPAEDIC SURGERY

## 2022-10-14 PROCEDURE — 76010000172 HC OR TIME 2.5 TO 3 HR INTENSV-TIER 1: Performed by: ORTHOPAEDIC SURGERY

## 2022-10-14 PROCEDURE — 73501 X-RAY EXAM HIP UNI 1 VIEW: CPT

## 2022-10-14 PROCEDURE — 74011250637 HC RX REV CODE- 250/637: Performed by: ORTHOPAEDIC SURGERY

## 2022-10-14 PROCEDURE — P9047 ALBUMIN (HUMAN), 25%, 50ML: HCPCS | Performed by: ANESTHESIOLOGY

## 2022-10-14 PROCEDURE — 77030035236 HC SUT PDS STRATFX BARB J&J -B: Performed by: ORTHOPAEDIC SURGERY

## 2022-10-14 PROCEDURE — 77030038692 HC WND DEB SYS IRMX -B: Performed by: ORTHOPAEDIC SURGERY

## 2022-10-14 PROCEDURE — 97165 OT EVAL LOW COMPLEX 30 MIN: CPT | Performed by: OCCUPATIONAL THERAPIST

## 2022-10-14 PROCEDURE — 77030038023 HC PIN FIX MAKO STRY -B: Performed by: ORTHOPAEDIC SURGERY

## 2022-10-14 PROCEDURE — 74011250636 HC RX REV CODE- 250/636: Performed by: ORTHOPAEDIC SURGERY

## 2022-10-14 PROCEDURE — 76060000036 HC ANESTHESIA 2.5 TO 3 HR: Performed by: ORTHOPAEDIC SURGERY

## 2022-10-14 PROCEDURE — 77030031139 HC SUT VCRL2 J&J -A: Performed by: ORTHOPAEDIC SURGERY

## 2022-10-14 PROCEDURE — 77030010507 HC ADH SKN DERMBND J&J -B: Performed by: ORTHOPAEDIC SURGERY

## 2022-10-14 PROCEDURE — 74011250636 HC RX REV CODE- 250/636: Performed by: ANESTHESIOLOGY

## 2022-10-14 PROCEDURE — 97161 PT EVAL LOW COMPLEX 20 MIN: CPT

## 2022-10-14 PROCEDURE — 76210000019 HC OR PH I REC 4 TO 4.5 HR: Performed by: ORTHOPAEDIC SURGERY

## 2022-10-14 PROCEDURE — 97116 GAIT TRAINING THERAPY: CPT

## 2022-10-14 PROCEDURE — 76000 FLUOROSCOPY <1 HR PHYS/QHP: CPT

## 2022-10-14 PROCEDURE — 77030029821: Performed by: ORTHOPAEDIC SURGERY

## 2022-10-14 DEVICE — IMPLANTABLE DEVICE: Type: IMPLANTABLE DEVICE | Site: HIP | Status: FUNCTIONAL

## 2022-10-14 DEVICE — LINER ACET SZ D ID36MM THK3.9MM 0DEG HIP X3 LOK RNG FOR: Type: IMPLANTABLE DEVICE | Site: HIP | Status: FUNCTIONAL

## 2022-10-14 DEVICE — HEAD FEM DELT V40 +0MM NK 36MM -- V40 BIOLOX: Type: IMPLANTABLE DEVICE | Site: HIP | Status: FUNCTIONAL

## 2022-10-14 DEVICE — HIP H2 TOT ADV OTHER HD -- IMPL CAPPED H2: Type: IMPLANTABLE DEVICE | Status: FUNCTIONAL

## 2022-10-14 RX ORDER — PHENYLEPHRINE HYDROCHLORIDE 10 MG/ML
INJECTION INTRAVENOUS
Status: DISCONTINUED | OUTPATIENT
Start: 2022-10-14 | End: 2022-10-14 | Stop reason: HOSPADM

## 2022-10-14 RX ORDER — ONDANSETRON 2 MG/ML
INJECTION INTRAMUSCULAR; INTRAVENOUS AS NEEDED
Status: DISCONTINUED | OUTPATIENT
Start: 2022-10-14 | End: 2022-10-14 | Stop reason: HOSPADM

## 2022-10-14 RX ORDER — MORPHINE SULFATE 2 MG/ML
2 INJECTION, SOLUTION INTRAMUSCULAR; INTRAVENOUS
Status: DISCONTINUED | OUTPATIENT
Start: 2022-10-14 | End: 2022-10-14 | Stop reason: HOSPADM

## 2022-10-14 RX ORDER — EPHEDRINE SULFATE/0.9% NACL/PF 50 MG/5 ML
SYRINGE (ML) INTRAVENOUS AS NEEDED
Status: DISCONTINUED | OUTPATIENT
Start: 2022-10-14 | End: 2022-10-14 | Stop reason: HOSPADM

## 2022-10-14 RX ORDER — MIDAZOLAM HYDROCHLORIDE 1 MG/ML
0.5 INJECTION, SOLUTION INTRAMUSCULAR; INTRAVENOUS
Status: DISCONTINUED | OUTPATIENT
Start: 2022-10-14 | End: 2022-10-14 | Stop reason: HOSPADM

## 2022-10-14 RX ORDER — TRANEXAMIC ACID 100 MG/ML
INJECTION, SOLUTION INTRAVENOUS AS NEEDED
Status: DISCONTINUED | OUTPATIENT
Start: 2022-10-14 | End: 2022-10-14 | Stop reason: HOSPADM

## 2022-10-14 RX ORDER — ACETAMINOPHEN 500 MG
1000 TABLET ORAL ONCE
Status: COMPLETED | OUTPATIENT
Start: 2022-10-14 | End: 2022-10-14

## 2022-10-14 RX ORDER — SODIUM CHLORIDE 0.9 % (FLUSH) 0.9 %
5-40 SYRINGE (ML) INJECTION EVERY 8 HOURS
Status: DISCONTINUED | OUTPATIENT
Start: 2022-10-14 | End: 2022-10-14 | Stop reason: HOSPADM

## 2022-10-14 RX ORDER — AMOXICILLIN 250 MG
1 CAPSULE ORAL 2 TIMES DAILY
Status: DISCONTINUED | OUTPATIENT
Start: 2022-10-14 | End: 2022-10-14 | Stop reason: HOSPADM

## 2022-10-14 RX ORDER — PREGABALIN 75 MG/1
75 CAPSULE ORAL ONCE
Status: COMPLETED | OUTPATIENT
Start: 2022-10-14 | End: 2022-10-14

## 2022-10-14 RX ORDER — GLYCOPYRROLATE 0.2 MG/ML
INJECTION INTRAMUSCULAR; INTRAVENOUS AS NEEDED
Status: DISCONTINUED | OUTPATIENT
Start: 2022-10-14 | End: 2022-10-14 | Stop reason: HOSPADM

## 2022-10-14 RX ORDER — NALOXONE HYDROCHLORIDE 0.4 MG/ML
0.4 INJECTION, SOLUTION INTRAMUSCULAR; INTRAVENOUS; SUBCUTANEOUS AS NEEDED
Status: DISCONTINUED | OUTPATIENT
Start: 2022-10-14 | End: 2022-10-14 | Stop reason: HOSPADM

## 2022-10-14 RX ORDER — SODIUM CHLORIDE, SODIUM LACTATE, POTASSIUM CHLORIDE, CALCIUM CHLORIDE 600; 310; 30; 20 MG/100ML; MG/100ML; MG/100ML; MG/100ML
25 INJECTION, SOLUTION INTRAVENOUS CONTINUOUS
Status: DISCONTINUED | OUTPATIENT
Start: 2022-10-14 | End: 2022-10-14 | Stop reason: HOSPADM

## 2022-10-14 RX ORDER — ONDANSETRON 2 MG/ML
4 INJECTION INTRAMUSCULAR; INTRAVENOUS
Status: DISCONTINUED | OUTPATIENT
Start: 2022-10-14 | End: 2022-10-14 | Stop reason: HOSPADM

## 2022-10-14 RX ORDER — PROPOFOL 10 MG/ML
INJECTION, EMULSION INTRAVENOUS
Status: DISCONTINUED | OUTPATIENT
Start: 2022-10-14 | End: 2022-10-14 | Stop reason: HOSPADM

## 2022-10-14 RX ORDER — SODIUM CHLORIDE 0.9 % (FLUSH) 0.9 %
5-40 SYRINGE (ML) INJECTION AS NEEDED
Status: DISCONTINUED | OUTPATIENT
Start: 2022-10-14 | End: 2022-10-14 | Stop reason: HOSPADM

## 2022-10-14 RX ORDER — OXYCODONE HYDROCHLORIDE 5 MG/1
5 TABLET ORAL
Qty: 28 TABLET | Refills: 0 | Status: SHIPPED | OUTPATIENT
Start: 2022-10-14 | End: 2022-10-21

## 2022-10-14 RX ORDER — EPHEDRINE SULFATE/0.9% NACL/PF 50 MG/5 ML
10 SYRINGE (ML) INTRAVENOUS
Status: COMPLETED | OUTPATIENT
Start: 2022-10-14 | End: 2022-10-14

## 2022-10-14 RX ORDER — HYDROCHLOROTHIAZIDE 25 MG/1
25 TABLET ORAL DAILY
Status: DISCONTINUED | OUTPATIENT
Start: 2022-10-15 | End: 2022-10-14 | Stop reason: HOSPADM

## 2022-10-14 RX ORDER — ALBUMIN HUMAN 250 G/1000ML
12.5 SOLUTION INTRAVENOUS ONCE
Status: COMPLETED | OUTPATIENT
Start: 2022-10-14 | End: 2022-10-14

## 2022-10-14 RX ORDER — BUPIVACAINE HYDROCHLORIDE 7.5 MG/ML
INJECTION, SOLUTION INTRASPINAL
Status: COMPLETED | OUTPATIENT
Start: 2022-10-14 | End: 2022-10-14

## 2022-10-14 RX ORDER — SODIUM CHLORIDE, SODIUM LACTATE, POTASSIUM CHLORIDE, CALCIUM CHLORIDE 600; 310; 30; 20 MG/100ML; MG/100ML; MG/100ML; MG/100ML
INJECTION, SOLUTION INTRAVENOUS
Status: DISCONTINUED | OUTPATIENT
Start: 2022-10-14 | End: 2022-10-14 | Stop reason: HOSPADM

## 2022-10-14 RX ORDER — MIDAZOLAM HYDROCHLORIDE 1 MG/ML
INJECTION, SOLUTION INTRAMUSCULAR; INTRAVENOUS AS NEEDED
Status: DISCONTINUED | OUTPATIENT
Start: 2022-10-14 | End: 2022-10-14 | Stop reason: HOSPADM

## 2022-10-14 RX ORDER — ATORVASTATIN CALCIUM 10 MG/1
10 TABLET, FILM COATED ORAL
Status: DISCONTINUED | OUTPATIENT
Start: 2022-10-14 | End: 2022-10-14 | Stop reason: HOSPADM

## 2022-10-14 RX ORDER — ASPIRIN 81 MG/1
81 TABLET ORAL 2 TIMES DAILY
Qty: 60 TABLET | Refills: 0 | Status: SHIPPED | OUTPATIENT
Start: 2022-10-15 | End: 2022-11-14

## 2022-10-14 RX ORDER — POLYETHYLENE GLYCOL 3350 17 G/17G
17 POWDER, FOR SOLUTION ORAL DAILY
Status: DISCONTINUED | OUTPATIENT
Start: 2022-10-15 | End: 2022-10-14 | Stop reason: HOSPADM

## 2022-10-14 RX ORDER — SODIUM CHLORIDE 9 MG/ML
125 INJECTION, SOLUTION INTRAVENOUS CONTINUOUS
Status: DISCONTINUED | OUTPATIENT
Start: 2022-10-14 | End: 2022-10-14 | Stop reason: HOSPADM

## 2022-10-14 RX ORDER — EPHEDRINE SULFATE/0.9% NACL/PF 50 MG/5 ML
25 SYRINGE (ML) INTRAVENOUS ONCE
Status: DISCONTINUED | OUTPATIENT
Start: 2022-10-14 | End: 2022-10-14 | Stop reason: HOSPADM

## 2022-10-14 RX ORDER — LIDOCAINE HYDROCHLORIDE 10 MG/ML
0.1 INJECTION, SOLUTION EPIDURAL; INFILTRATION; INTRACAUDAL; PERINEURAL AS NEEDED
Status: DISCONTINUED | OUTPATIENT
Start: 2022-10-14 | End: 2022-10-14 | Stop reason: HOSPADM

## 2022-10-14 RX ORDER — AMOXICILLIN 250 MG
1 CAPSULE ORAL DAILY
Qty: 30 TABLET | Refills: 0 | Status: SHIPPED | OUTPATIENT
Start: 2022-10-14

## 2022-10-14 RX ORDER — ROPIVACAINE HYDROCHLORIDE 5 MG/ML
INJECTION, SOLUTION EPIDURAL; INFILTRATION; PERINEURAL AS NEEDED
Status: DISCONTINUED | OUTPATIENT
Start: 2022-10-14 | End: 2022-10-14 | Stop reason: HOSPADM

## 2022-10-14 RX ORDER — PANTOPRAZOLE SODIUM 40 MG/1
40 TABLET, DELAYED RELEASE ORAL
Status: DISCONTINUED | OUTPATIENT
Start: 2022-10-15 | End: 2022-10-14 | Stop reason: HOSPADM

## 2022-10-14 RX ORDER — METOPROLOL SUCCINATE 50 MG/1
50 TABLET, EXTENDED RELEASE ORAL DAILY
Status: DISCONTINUED | OUTPATIENT
Start: 2022-10-15 | End: 2022-10-14 | Stop reason: HOSPADM

## 2022-10-14 RX ORDER — DIPHENHYDRAMINE HYDROCHLORIDE 50 MG/ML
12.5 INJECTION, SOLUTION INTRAMUSCULAR; INTRAVENOUS AS NEEDED
Status: DISCONTINUED | OUTPATIENT
Start: 2022-10-14 | End: 2022-10-14 | Stop reason: HOSPADM

## 2022-10-14 RX ORDER — ONDANSETRON 2 MG/ML
4 INJECTION INTRAMUSCULAR; INTRAVENOUS AS NEEDED
Status: DISCONTINUED | OUTPATIENT
Start: 2022-10-14 | End: 2022-10-14 | Stop reason: HOSPADM

## 2022-10-14 RX ORDER — OXYCODONE HYDROCHLORIDE 5 MG/1
5 TABLET ORAL
Status: DISCONTINUED | OUTPATIENT
Start: 2022-10-14 | End: 2022-10-14 | Stop reason: HOSPADM

## 2022-10-14 RX ORDER — FENTANYL CITRATE 50 UG/ML
25 INJECTION, SOLUTION INTRAMUSCULAR; INTRAVENOUS
Status: DISCONTINUED | OUTPATIENT
Start: 2022-10-14 | End: 2022-10-14 | Stop reason: HOSPADM

## 2022-10-14 RX ORDER — DEXAMETHASONE SODIUM PHOSPHATE 4 MG/ML
INJECTION, SOLUTION INTRA-ARTICULAR; INTRALESIONAL; INTRAMUSCULAR; INTRAVENOUS; SOFT TISSUE AS NEEDED
Status: DISCONTINUED | OUTPATIENT
Start: 2022-10-14 | End: 2022-10-14 | Stop reason: HOSPADM

## 2022-10-14 RX ORDER — ACETAMINOPHEN 500 MG
1000 TABLET ORAL EVERY 8 HOURS
Status: DISCONTINUED | OUTPATIENT
Start: 2022-10-14 | End: 2022-10-14 | Stop reason: HOSPADM

## 2022-10-14 RX ORDER — POLYETHYLENE GLYCOL 3350 17 G/17G
17 POWDER, FOR SOLUTION ORAL
Qty: 15 PACKET | Refills: 0 | Status: SHIPPED | OUTPATIENT
Start: 2022-10-14 | End: 2022-10-29

## 2022-10-14 RX ORDER — FACIAL-BODY WIPES
10 EACH TOPICAL DAILY PRN
Status: DISCONTINUED | OUTPATIENT
Start: 2022-10-16 | End: 2022-10-14 | Stop reason: HOSPADM

## 2022-10-14 RX ORDER — ASPIRIN 81 MG/1
81 TABLET ORAL 2 TIMES DAILY
Status: DISCONTINUED | OUTPATIENT
Start: 2022-10-15 | End: 2022-10-14 | Stop reason: HOSPADM

## 2022-10-14 RX ORDER — HYDROMORPHONE HYDROCHLORIDE 1 MG/ML
.2-.5 INJECTION, SOLUTION INTRAMUSCULAR; INTRAVENOUS; SUBCUTANEOUS
Status: DISCONTINUED | OUTPATIENT
Start: 2022-10-14 | End: 2022-10-14 | Stop reason: HOSPADM

## 2022-10-14 RX ORDER — FENTANYL CITRATE 50 UG/ML
50 INJECTION, SOLUTION INTRAMUSCULAR; INTRAVENOUS AS NEEDED
Status: DISCONTINUED | OUTPATIENT
Start: 2022-10-14 | End: 2022-10-14 | Stop reason: HOSPADM

## 2022-10-14 RX ORDER — CELECOXIB 200 MG/1
200 CAPSULE ORAL ONCE
Status: COMPLETED | OUTPATIENT
Start: 2022-10-14 | End: 2022-10-14

## 2022-10-14 RX ADMIN — PHENYLEPHRINE HYDROCHLORIDE 40 MCG/KG/MIN: 10 INJECTION INTRAVENOUS at 07:52

## 2022-10-14 RX ADMIN — Medication 10 MG: at 09:42

## 2022-10-14 RX ADMIN — CELECOXIB 200 MG: 200 CAPSULE ORAL at 06:08

## 2022-10-14 RX ADMIN — SODIUM CHLORIDE, POTASSIUM CHLORIDE, SODIUM LACTATE AND CALCIUM CHLORIDE: 600; 310; 30; 20 INJECTION, SOLUTION INTRAVENOUS at 09:11

## 2022-10-14 RX ADMIN — TRANEXAMIC ACID 1 G: 100 INJECTION, SOLUTION INTRAVENOUS at 08:00

## 2022-10-14 RX ADMIN — PHENYLEPHRINE HYDROCHLORIDE 120 MCG: 10 INJECTION INTRAVENOUS at 07:52

## 2022-10-14 RX ADMIN — Medication 10 MG: at 09:18

## 2022-10-14 RX ADMIN — TRANEXAMIC ACID 1 G: 100 INJECTION, SOLUTION INTRAVENOUS at 09:54

## 2022-10-14 RX ADMIN — ACETAMINOPHEN 1000 MG: 500 TABLET ORAL at 16:41

## 2022-10-14 RX ADMIN — DEXAMETHASONE SODIUM PHOSPHATE 4 MG: 4 INJECTION, SOLUTION INTRAMUSCULAR; INTRAVENOUS at 08:20

## 2022-10-14 RX ADMIN — PREGABALIN 75 MG: 75 CAPSULE ORAL at 06:08

## 2022-10-14 RX ADMIN — PROPOFOL 50 MCG/KG/MIN: 10 INJECTION, EMULSION INTRAVENOUS at 07:51

## 2022-10-14 RX ADMIN — BUPIVACAINE HYDROCHLORIDE IN DEXTROSE 12 MG: 7.5 INJECTION, SOLUTION SUBARACHNOID at 07:39

## 2022-10-14 RX ADMIN — SODIUM CHLORIDE, POTASSIUM CHLORIDE, SODIUM LACTATE AND CALCIUM CHLORIDE: 600; 310; 30; 20 INJECTION, SOLUTION INTRAVENOUS at 07:00

## 2022-10-14 RX ADMIN — HYDROMORPHONE HYDROCHLORIDE 0.5 MG: 1 INJECTION, SOLUTION INTRAMUSCULAR; INTRAVENOUS; SUBCUTANEOUS at 10:31

## 2022-10-14 RX ADMIN — ONDANSETRON HYDROCHLORIDE 4 MG: 2 INJECTION, SOLUTION INTRAMUSCULAR; INTRAVENOUS at 08:20

## 2022-10-14 RX ADMIN — MIDAZOLAM HYDROCHLORIDE 2 MG: 1 INJECTION, SOLUTION INTRAMUSCULAR; INTRAVENOUS at 07:20

## 2022-10-14 RX ADMIN — ALBUMIN (HUMAN) 12.5 G: 0.25 INJECTION, SOLUTION INTRAVENOUS at 14:00

## 2022-10-14 RX ADMIN — Medication 1000 MG: at 06:08

## 2022-10-14 RX ADMIN — WATER 2 G: 1 INJECTION INTRAMUSCULAR; INTRAVENOUS; SUBCUTANEOUS at 07:50

## 2022-10-14 RX ADMIN — PHENYLEPHRINE HYDROCHLORIDE 80 MCG: 10 INJECTION INTRAVENOUS at 09:17

## 2022-10-14 RX ADMIN — GLYCOPYRROLATE 0.2 MG: 0.2 INJECTION, SOLUTION INTRAMUSCULAR; INTRAVENOUS at 08:31

## 2022-10-14 RX ADMIN — PHENYLEPHRINE HYDROCHLORIDE 80 MCG: 10 INJECTION INTRAVENOUS at 08:11

## 2022-10-14 RX ADMIN — Medication 10 MG: at 13:14

## 2022-10-14 RX ADMIN — PHENYLEPHRINE HYDROCHLORIDE 80 MCG: 10 INJECTION INTRAVENOUS at 07:48

## 2022-10-14 RX ADMIN — Medication 3 AMPULE: at 06:09

## 2022-10-14 RX ADMIN — HYDROMORPHONE HYDROCHLORIDE 0.5 MG: 1 INJECTION, SOLUTION INTRAMUSCULAR; INTRAVENOUS; SUBCUTANEOUS at 13:23

## 2022-10-14 RX ADMIN — SODIUM CHLORIDE, POTASSIUM CHLORIDE, SODIUM LACTATE AND CALCIUM CHLORIDE 25 ML/HR: 600; 310; 30; 20 INJECTION, SOLUTION INTRAVENOUS at 07:07

## 2022-10-14 RX ADMIN — PHENYLEPHRINE HYDROCHLORIDE 50 MCG/MIN: 10 INJECTION INTRAVENOUS at 10:52

## 2022-10-14 RX ADMIN — SODIUM CHLORIDE 125 ML/HR: 0.9 INJECTION, SOLUTION INTRAVENOUS at 10:55

## 2022-10-14 NOTE — ANESTHESIA PROCEDURE NOTES
Spinal Block    Performed by: Rosalind Ng MD  Authorized by: Rosalind Ng MD     Pre-procedure: Indications: primary anesthetic  Preanesthetic Checklist: risks and benefits discussed and timeout performed      Spinal Block:   Patient Position:  Seated  Prep Region:  Lumbar  Prep: chlorhexidine      Location:  L4-5  Technique:  Single shot  Local: bupivacaine 0.75% in dextrose 8.25% preserv-free (SENSORCAINE) Intrathecal - Intrathecal   12 mg - 10/14/2022 7:39:00 AM    Med Admin Time: 10/14/2022 7:39 AM    Needle:   Needle Type:  Pencan  Needle Gauge:  25 G  Attempts:  3 or more      Events: CSF confirmed, no blood with aspiration and no paresthesia        Assessment:  Insertion:  Uncomplicated  Patient tolerance:  Patient tolerated the procedure well with no immediate complications  12 mg bupivacaine given intrathecally. This was an extremely difficult spinal requiring more than 3 attempts at 3 different levels. Success at L4-L5.

## 2022-10-14 NOTE — PROGRESS NOTES
OCCUPATIONAL THERAPY EVALUATION/DISCHARGE  Patient: Keisha Perdomo (89 y.o. female)  Date: 10/14/2022  Primary Diagnosis: S/P total hip arthroplasty [Z96.649]  Procedure(s) (LRB):  LEFT TOTAL HIP ARTHROPLASTY, MAKOPLASTY (Left) Day of Surgery   Precautions: anterior hip, WBAT       ASSESSMENT  Based on the objective data described below, the patient presents with report of slight dizziness with upright activity, but vitals were stable, and this didn't affect function. Pts  was present and both parties were educated on adaptive strategies for ADLS, fall prevention and home safety. Good understanding of teaching today. Pt was able to perform LB ADLs with SBA overall and pt does have a hip kit if needed. Her  also reports that he can assist.  Pt is in agreement that further OT services aren't needed at this time. Seated: 135/65 HR 78  O2 sat room air 96%  Standin/60 O2 sat 95%  Standing after mobility down keyes and back 131/71    Current Level of Function (ADLs/self-care): supervision with RW for mobility and transfers,   Feeding: Independent    Oral Facial Hygiene/Grooming: Supervision    Bathing: Stand-by assistance    Upper Body Dressing: Supervision    Lower Body Dressing: Stand-by assistance    Toileting: Supervision    Functional Outcome Measure: The patient scored 80/100 on the barthel outcome measure which is indicative of minimal deficits with mobility and ADLS. Other factors to consider for discharge: none     PLAN :  Recommendation for discharge: (in order for the patient to meet his/her long term goals)  No skilled occupational therapy/ follow up rehabilitation needs identified at this time. This discharge recommendation:  Has been made in collaboration with the attending provider and/or case management    IF patient discharges home will need the following DME: walker: rolling       SUBJECTIVE:   Patient stated I feel a little dizzy.   BP stable throughout session    OBJECTIVE DATA SUMMARY:   HISTORY:   Past Medical History:   Diagnosis Date    Age-related osteoporosis without current pathological fracture 08/21/2017    RX = Prolia    Allergic rhinitis 08/21/2017    Atherosclerosis 10/2022    CT finding    Cancer ([address] 2008    Chronic pain     Diverticulosis of large intestine without hemorrhage 08/21/2017    DJD (degenerative joint disease) 08/21/2017    GERD (gastroesophageal reflux disease) 08/21/2017    Hepatic steatosis 02/2012    CT Finding    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 Clive Hamm    History of palpitations     Hydronephrosis of left kidney 10/2022    Hypercholesterolemia     Hypertension 08/21/2017    Dr. Royal Nichole    Internal hemorrhoid 08/21/2017    Lumbar spinal stenosis 09/20/2017    S/P radiation therapy 2009    TMJ dysfunction 04/05/2018    Vitamin D deficiency 08/21/2017     Past Surgical History:   Procedure Laterality Date    HX BREAST BIOPSY Right 2008    positive ultrasound core bx    HX BREAST BIOPSY Bilateral 2008    benign mri bx    HX BREAST LUMPECTOMY Right 2009    x2    HX BUNIONECTOMY Bilateral     HX CATARACT REMOVAL Bilateral 2020    HX COLONOSCOPY      HX DILATION AND CURETTAGE      HX TONSILLECTOMY      HX TUBAL LIGATION  1980       Prior Level of Function/Environment/Context: independent with all ADLs and IADLs, driving   Expanded or extensive additional review of patient history:     Home Situation  Home Environment: Private residence  # Steps to Enter: 3  Rails to Enter: Yes  Hand Rails : Right  One/Two Story Residence: One story  Living Alone: No  Support Systems: Spouse/Significant Other  Patient Expects to be Discharged to[de-identified] Home  Current DME Used/Available at Home: Commode, bedside, Shower chair, Cane, straight  Tub or Shower Type: Shower    Hand dominance: Right    EXAMINATION OF PERFORMANCE DEFICITS:  Cognitive/Behavioral Status:  Neurologic State: Alert  Orientation Level: Oriented X4  Cognition: Appropriate decision making; Appropriate safety awareness; Appropriate for age attention/concentration  Perception: Appears intact  Perseveration: No perseveration noted  Safety/Judgement: Awareness of environment; Fall prevention;Home safety;Good awareness of safety precautions; Insight into deficits    Hearing: Auditory  Auditory Impairment: None    Vision/Perceptual:                           Acuity: Within Defined Limits         Range of Motion:    AROM: Within functional limits (L hip decreased but functional)                         Strength:    Strength: Within functional limits (LLE decreased but functional)                Coordination:  Coordination: Within functional limits  Fine Motor Skills-Upper: Left Intact; Right Intact    Gross Motor Skills-Upper: Left Intact; Right Intact    Tone & Sensation:    Tone: Normal  Sensation: Intact                      Balance:  Sitting: Intact  Standing: Intact; With support (RW)    Functional Mobility and Transfers for ADLs:  Bed Mobility:  Rolling: Modified independent  Supine to Sit: Modified independent  Sit to Supine: Modified independent  Scooting: Modified independent    Transfers:  Sit to Stand: Supervision (vc for hand placement)  Stand to Sit: Supervision (vc for hand placement)  Bed to Chair: Supervision (with Rw)  Bathroom Mobility:  (supervision with RW)  Toilet Transfer : Supervision (standard toilet)    ADL Assessment:  Feeding: Independent    Oral Facial Hygiene/Grooming: Supervision    Bathing: Stand-by assistance    Upper Body Dressing: Supervision    Lower Body Dressing: Stand-by assistance    Toileting: Supervision                ADL Intervention and task modifications:       Grooming  Washing Hands: Supervision (standing at sink verbal cue for walker safety)    Lower Body Dressing Assistance  Underpants: Supervision (educated ot sit to thread over operative LE firs and off last)  Pants With Elastic Waist: Supervision (educated ot sit to thread over operative LE firs and off last)  Socks:  (reports having a sock aide and reacher and knows how to use it)  Leg Crossed Method Used: No  Position Performed: Seated edge of bed  Cues: Doff;Don;Verbal cues provided    Toileting  Bladder Hygiene: Independent  Clothing Management: Supervision    Cognitive Retraining  Safety/Judgement: Awareness of environment; Fall prevention;Home safety;Good awareness of safety precautions; Insight into deficits    Precautions: Patient recalled and demonstrated avoiding extreme planes of movement with Left LE during ADLs and functional mobility with no cues. Bathing: Patient instructed when bathing to not submerge wound in water, stand to shower or sponge bathe, informed pt that the bandage that covers the wound is waterproof and follow doctors instructions on bathing and when to start bathing. Educated pt to Patient indicated understanding. Dressing joint: Patient instructed and demonstrated understanding to don/doff Left LE first/last with no cues. Patient instructed and demonstrated to don all clothing while sitting prior to standing, doff all clothing to knees while standing, then sit to doff clothing off from knees to feet to facilitate fall prevention, pain management, and energy conservation with Stand-by assistance. Dressing joint: To increase independence with lower body dressing, patient instructed and demonstrated to reach down Left LE in a seated position slowly to prevent tearing/shearing until slight pull is felt  Home safety: Patient instructed on home modifications and safety (raise height of ADL objects, appropriate height of chair surfaces, recliner safety, change of floor surfaces, clear pathways) to increase independence and fall prevention. Patient indicated understanding.    Standing: Patient instructed and demonstrated to walk up to sink/countertop/surfaces, step into walker to increase safety of joint and fall prevention with Supervision. Instructed to apply concept of hip contraindications to ADLs within the home (no extreme reaching across body to Bilateral side, square off while using objects, slide objects along surfaces). Patient instructed to increase amount of time standing, observe standing position during ADLs to increase even weight bearing through bilateral LEs to increase independence with ADLs. Goal to be reached 30 days post - op, per orthopedic surgeon or per PT. Patient indicated understanding. Functional Measure:    Barthel Index:  Bathin  Bladder: 10  Bowels: 10  Groomin  Dressin  Feeding: 10  Mobility: 15  Stairs: 5  Toilet Use: 5  Transfer (Bed to Chair and Back): 15  Total: 80/100      The Barthel ADL Index: Guidelines  1. The index should be used as a record of what a patient does, not as a record of what a patient could do. 2. The main aim is to establish degree of independence from any help, physical or verbal, however minor and for whatever reason. 3. The need for supervision renders the patient not independent. 4. A patient's performance should be established using the best available evidence. Asking the patient, friends/relatives and nurses are the usual sources, but direct observation and common sense are also important. However direct testing is not needed. 5. Usually the patient's performance over the preceding 24-48 hours is important, but occasionally longer periods will be relevant. 6. Middle categories imply that the patient supplies over 50 per cent of the effort. 7. Use of aids to be independent is allowed. Score Interpretation (from [address] [address])    Independent   60-79 Minimally independent   40-59 Partially dependent   20-39 Very dependent   <20 Totally dependent     -Josias Valdez., Barthel, D.W. (1965). Functional evaluation: the Barthel Index.  [address] [facility] (14)2.  -EVELYNE Acevedo, [address] (1997). The Barthel activities of daily living index: self-reporting versus actual performance in the old (> or = 75 years). Journal of [facility] 45(7), 14 [facility] CenterЮлияЮлия, Reina Rayo, Josrikanth Chew. (). Measuring the change in disability after inpatient rehabilitation; comparison of the responsiveness of the Barthel Index and Functional [place] Measure. Journal of Neurology, Neurosurgery, and Psychiatry, 66(4), 766-897. ABEL Arguelles, JAMIE Mata, & Iram Treviño M.A. (2004) Assessment of post-stroke quality of life in cost-effectiveness studies: The usefulness of the Barthel Index and the EuroQoL-5D. Quality of Life Research, 15, 944-74         Occupational Therapy Evaluation Charge Determination   History Examination Decision-Making   LOW Complexity : Brief history review  LOW Complexity : 1-3 performance deficits relating to physical, cognitive , or psychosocial skils that result in activity limitations and / or participation restrictions  LOW Complexity : No comorbidities that affect functional and no verbal or physical assistance needed to complete eval tasks       Based on the above components, the patient evaluation is determined to be of the following complexity level: LOW   Pain Ratin/10    Activity Tolerance:   Fair      After treatment patient left in no apparent distress:    Supine in bed, Call bell within reach, Caregiver / family present, and ice on left hip    COMMUNICATION/EDUCATION:   The patients plan of care was discussed with: Physical therapist, Registered nurse, and patient and her  .      Thank you for this referral.  TENZIN Amos/ALEXANDER  Time Calculation: 26 mins

## 2022-10-14 NOTE — BRIEF OP NOTE
Brief Postoperative Note    Patient: Farhad Preciado  YOB: 1945  MRN: [id number]    Date of Procedure: 10/14/2022     Pre-Op Diagnosis: LEFT HIP OSTEOARTHRITIS    Post-Op Diagnosis: Same as preoperative diagnosis. Procedure(s):  LEFT TOTAL HIP ARTHROPLASTY, MAKOPLASTY    Surgeon(s):  Racquel Montiel MD    Surgical Assistant: Surg Asst-1: Donna Paniagua    Anesthesia: Spinal     Estimated Blood Loss (mL): 544     Complications: None    Specimens: * No specimens in log *     Implants:   Implant Name Type Inv. Item Serial No.  Lot No. LRB No. Used Action   SHELL ACET MULT HOLE D 50 MM TRIDENT II TRIATHLON - SNA  SHELL ACET MULT HOLE D 50 MM TRIDENT II TRIATHLON NA [employer] [id number] Left 1 Implanted   LINER ACET SZ D ID36MM THK3. 9MM 0DEG HIP X3 PAUL RNG FOR - SNA  LINER ACET SZ D ID36MM THK3. 9MM 0DEG HIP X3 PAUL RNG FOR NA [employer] [id number] Left 1 Implanted   STEM FEM STD OFFSET 3 HIP CLLRD INSIGNIA - SNA  STEM FEM STD OFFSET 3 HIP CLLRD INSIGNIA NA [employer] [id number] Left 1 Implanted   HEAD FEM DELT V40 +0MM NK 36MM -- V40 BIOLOX - SNA  HEAD FEM DELT V40 +0MM NK 36MM -- V40 BIOLOX NA [employer] [id number] Left 1 Implanted       Drains: * No LDAs found *    Findings: Stable total hip    Electronically Signed by Lj Lai MD on 10/14/2022 at 10:20 AM

## 2022-10-14 NOTE — ANESTHESIA PREPROCEDURE EVALUATION
Relevant Problems   CARDIOVASCULAR   (+) Hypertension      GASTROINTESTINAL   (+) GERD (gastroesophageal reflux disease)       Anesthetic History   No history of anesthetic complications            Review of Systems / Medical History  Patient summary reviewed, nursing notes reviewed and pertinent labs reviewed    Pulmonary  Within defined limits                 Neuro/Psych   Within defined limits          Comments: Lumbar spinal stenosis Cardiovascular    Hypertension          Hyperlipidemia    Exercise tolerance: >4 METS  Comments: History of palpitations   Atherosclerosis     GI/Hepatic/Renal     GERD      Liver disease    Comments: Diverticulosis   Hepatic steatosis Endo/Other        Arthritis and cancer     Other Findings   Comments: DJD  TMJ dysfunction  Hx Hydronephrosis of left kidney  History of avascular necrosis of capital femoral epiphysis   Chronic pain   Hx Right breast ca           Physical Exam    Airway             Cardiovascular  Regular rate and rhythm,  S1 and S2 normal,  no murmur, click, rub, or gallop             Dental         Pulmonary  Breath sounds clear to auscultation               Abdominal  GI exam deferred       Other Findings            Anesthetic Plan    ASA: 3  Anesthesia type: spinal            Anesthetic plan and risks discussed with: Patient

## 2022-10-14 NOTE — PROGRESS NOTES
Discharge teaching done with patient and spouse present. Both expressed understanding of the instructions presented. Patient assisted with getting dressed and discharge instructions given in print. Patient mobilized off unit via wheelchair with personal effects in the possession of her and her spouse.

## 2022-10-14 NOTE — PERIOP NOTES
Handoff Report from Operating Room to PACU    Report received from LASHANDA Abdalla and ARYAN Santiago CRNA regarding Jimena Gerber. Surgeon(s):  Melissa Tom MD  And Procedure(s) (LRB):  LEFT TOTAL HIP ARTHROPLASTY, MAKOPLASTY (Left)  confirmed   with allergies and dressings discussed. Anesthesia type, drugs, patient history, complications, estimated blood loss, vital signs, intake and output, and last pain medication, lines, and temperature were reviewed.

## 2022-10-14 NOTE — ANESTHESIA POSTPROCEDURE EVALUATION
Procedure(s):  LEFT TOTAL HIP ARTHROPLASTY, MAKOPLASTY. spinal, MAC    Anesthesia Post Evaluation        Patient location during evaluation: PACU  Note status: Adequate. Level of consciousness: responsive to verbal stimuli and sleepy but conscious  Pain management: satisfactory to patient  Airway patency: patent  Anesthetic complications: no  Cardiovascular status: acceptable  Respiratory status: acceptable  Hydration status: acceptable  Comments: +Post-Anesthesia Evaluation and Assessment    Patient: Bre Webb MRN: [id number]  SSN: [id number]   YOB: 1945  Age: 65 y.o. Sex: female      Cardiovascular Function/Vital Signs    BP (!) 117/54 (BP 1 Location: Left upper arm, BP Patient Position: At rest)   Pulse 62   Temp 36.1 °C (97 °F)   Resp 12   Ht 5' 3\" (1.6 m)   Wt 69.6 kg (153 lb 7 oz)   SpO2 100%   BMI 27.18 kg/m²     Patient is status post Procedure(s):  LEFT TOTAL HIP ARTHROPLASTY, MAKOPLASTY. Nausea/Vomiting: Controlled. Postoperative hydration reviewed and adequate. Pain:  Pain Scale 1: Visual (10/14/22 1400)  Pain Intensity 1: 0 (10/14/22 1400)   Managed. Neurological Status:   Neuro (WDL): Within Defined Limits (10/14/22 1035)   At baseline. Mental Status and Level of Consciousness: Arousable. Pulmonary Status:   O2 Device: Nasal cannula (10/14/22 1400)   Adequate oxygenation and airway patent. Complications related to anesthesia: None    Post-anesthesia assessment completed. No concerns. Signed By: Carrington Valdez MD    10/14/2022  Post anesthesia nausea and vomiting:  controlled      INITIAL Post-op Vital signs:   Vitals Value Taken Time   /54 10/14/22 1400   Temp 36.1 °C (97 °F) 10/14/22 1021   Pulse 61 10/14/22 1404   Resp 15 10/14/22 1404   SpO2 99 % 10/14/22 1404   Vitals shown include unvalidated device data.

## 2022-10-14 NOTE — PROGRESS NOTES
PHYSICAL THERAPY EVALUATION/DISCHARGE  Patient: Colin Cash (29 y.o. female)  Date: 10/14/2022  Primary Diagnosis: S/P total hip arthroplasty [Z96.649]  Procedure(s) (LRB):  LEFT TOTAL HIP ARTHROPLASTY, MAKOPLASTY (Left) Day of Surgery   Precautions: WBAT         ASSESSMENT  Based on the objective data described below, the patient presents with minimal post op pain, decreased LLE ROM/strength, impaired gait mechanics, and decreased activity tolerance. Pt received in the bathroom on the toilet. O2 sats wnl on RA.  present and observing session. Reviewed anterior approach THR precautions and pt able to verbalize them. Discussed ice use frequency and technique. Mobility occurred at S to mod I with use of a RW this session. VSS, however pt reports feeling slightly groggy. No other deficits noted that warrant further PT intervention. Pt will need a RW for discharge. Recommend Outpatient PT at discharge. Functional Outcome Measure: The patient scored 100/100 on the Barthel Index outcome measure which is indicative of independencr. Other factors to consider for discharge: supportive       Further skilled acute physical therapy is not indicated at this time. PLAN :  Recommendation for discharge: (in order for the patient to meet his/her long term goals)  Outpatient physical therapy follow up recommended for [facility] rehab    This discharge recommendation:  A follow-up discussion with the attending provider and/or case management is planned    IF patient discharges home will need the following DME: rolling walker       SUBJECTIVE:   Patient stated I am very active.     OBJECTIVE DATA SUMMARY:   HISTORY:    Past Medical History:   Diagnosis Date    Age-related osteoporosis without current pathological fracture 08/21/2017    RX = Prolia    Allergic rhinitis 08/21/2017    Atherosclerosis 10/2022    CT finding    Cancer [facility]) 2008    Chronic pain     Diverticulosis of large intestine without hemorrhage 08/21/2017    DJD (degenerative joint disease) 08/21/2017    GERD (gastroesophageal reflux disease) 08/21/2017    Hepatic steatosis 02/2012    CT Finding    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 Philly Kyle    History of palpitations     Hydronephrosis of left kidney 10/2022    Hypercholesterolemia     Hypertension 08/21/2017    Dr. Fraga Sick    Internal hemorrhoid 08/21/2017    Lumbar spinal stenosis 09/20/2017    S/P radiation therapy 2009    TMJ dysfunction 04/05/2018    Vitamin D deficiency 08/21/2017     Past Surgical History:   Procedure Laterality Date    HX BREAST BIOPSY Right 2008    positive ultrasound core bx    HX BREAST BIOPSY Bilateral 2008    benign mri bx    HX BREAST LUMPECTOMY Right 2009    x2    HX BUNIONECTOMY Bilateral     HX CATARACT REMOVAL Bilateral 2020    HX COLONOSCOPY      HX DILATION AND CURETTAGE      HX TONSILLECTOMY      HX TUBAL LIGATION  1980       Prior level of function: I, antalgic gait, driving, very active at baseline  Personal factors and/or comorbidities impacting plan of care: none    Home Situation  Home Environment: Private residence  # Steps to Enter: 3  Rails to Enter: Yes  Hand Rails : Right  One/Two Story Residence: One story  Living Alone: No  Support Systems: Spouse/Significant Other  Patient Expects to be Discharged to[de-identified] Home  Current DME Used/Available at Home: Commode, bedside, Shower chair, Cane, straight  Tub or Shower Type: Shower    EXAMINATION/PRESENTATION/DECISION MAKING:   Critical Behavior:              Hearing: Auditory  Auditory Impairment: None  Range Of Motion:  AROM: Within functional limits (L hip decreased but functional)                       Strength:    Strength:  Within functional limits (LLE decreased but functional)                    Tone & Sensation:   Tone: Normal              Sensation: Intact Coordination:  Coordination: Within functional limits       Functional Mobility:  Bed Mobility:  Rolling: Modified independent  Supine to Sit: Modified independent  Sit to Supine: Modified independent  Scooting: Modified independent  Transfers:  Sit to Stand: Supervision (vc for hand placement)  Stand to Sit: Supervision (vc for hand placement)                       Balance:   Sitting: Intact  Standing: Intact; With support (RW)  Ambulation/Gait Training:  Distance (ft): 150 Feet (ft)  Assistive Device: Gait belt;Walker, rolling  Ambulation - Level of Assistance: Modified independent     Gait Description (WDL): Exceptions to WDL  Gait Abnormalities: Decreased step clearance              Speed/Ines: Pace decreased (<100 feet/min)  Step Length: Right shortened;Left shortened            Stairs:  Pt with adequate LLE ROM/strength to safely navigate her 3 brayden her home with a rail         Functional Measure:  Barthel Index:    Bathin  Bladder: 10  Bowels: 10  Groomin  Dressing: 10  Feeding: 10  Mobility: 15  Stairs: 10  Toilet Use: 10  Transfer (Bed to Chair and Back): 15  Total: 100/100       The Barthel ADL Index: Guidelines  1. The index should be used as a record of what a patient does, not as a record of what a patient could do. 2. The main aim is to establish degree of independence from any help, physical or verbal, however minor and for whatever reason. 3. The need for supervision renders the patient not independent. 4. A patient's performance should be established using the best available evidence. Asking the patient, friends/relatives and nurses are the usual sources, but direct observation and common sense are also important. However direct testing is not needed. 5. Usually the patient's performance over the preceding 24-48 hours is important, but occasionally longer periods will be relevant. 6. Middle categories imply that the patient supplies over 50 per cent of the effort.   7. Use of aids to be independent is allowed. Score Interpretation (from [facility] 83)    Independent   60-79 Minimally independent   40-59 Partially dependent   20-39 Very dependent   <20 Totally dependent     -Josias Valdez., BarthelML. (1965). Functional evaluation: the Barthel Index. [address] ([address] [place] [address] (). The Barthel activities of daily living index: self-reporting versus actual performance in the old (> or = 75 years). Journal of [address] [place] [address] 45(7), [facility], J.J.M.F, Germania Johnstondhaval., Nitin Sole. (1999). Measuring the change in disability after inpatient rehabilitation; comparison of the responsiveness of the Barthel Index and Functional [place] Measure. Journal of Neurology, Neurosurgery, and Psychiatry, 66(4), 837-993. ABEL Babin, JAMIE Mata, & Raysa Fox MVIRIDIANA. (2004) Assessment of post-stroke quality of life in cost-effectiveness studies: The usefulness of the Barthel Index and the EuroQoL-5D. Quality of Life Research, 13, 427-97        Pain Ratin/10 prior to session 5-6/10 post session    Activity Tolerance:   Good and SpO2 stable on RA      After treatment patient left in no apparent distress:   Supine in bed, Call bell within reach, Caregiver / family present, Side rails x 3, and ice on L hip    COMMUNICATION/EDUCATION:   The patients plan of care was discussed with: Occupational therapist, Registered nurse, and NP . Fall prevention education was provided and the patient/caregiver indicated understanding.     Thank you for this referral.  Floresita Welch, PT   Time Calculation: 22 mins

## 2022-10-14 NOTE — DISCHARGE INSTRUCTIONS
Discharge Instructions: How to [facility]  Surgery: Total Hip Replacement  Surgeon:   [unfilled]  Surgery Date:  10/14/2022    To relieve pain:  Use ice/gel packs. Put the ice pack directly over the wound, or anywhere you are hurting or swollen. To control pain and swelling, keep ice on regularly, especially after physical activity. The packs should stay cold for 3-4 hours. When it is not cold anymore, rotate with the packs in the freezer. Elevate your leg. This will also keep swelling down. Rest for at least 20 minutes between activity or exercises. To keep track of your pain medications, write down what you take and when you take it. The last dose of pain medication you got in the hospital was:       Medication    Dose    Date & Time          Choose your medications based on the pain scale below: To keep your pain under control, take Tylenol every 6 hours for 14 days - even if you feel like you dont need it. For mild to moderate pain (4-6 on pain scale), take one pain pill every 3-4 hours as needed. For severe pain (7-10 on pain scale), take two pain pills every 3-4 hours as needed. To prevent nausea, take your pain medications with food. Pain Scale              As your pain lessens:    Slowly start taking less pain medication. You may do this by waiting longer between doses or by taking smaller doses. Stop using the pain medications as soon as you no longer need it, usually in 2-3 weeks. Aspirin  To prevent blood clots, you will need to take Aspirin 81 mg twice a day for 30 days. To prevent stomach upset or bleeding:  Do not take non-steroidal anti-inflammatory medications (Ibuprofen, Advil, Motrin, Naproxen, etc.)   Take Pepcid 20 mg twice a day, or a similar home medication, while you are taking a blood thinner.                             PRIMASEAL DRESSING INSTRUCTIONS        Leave this covering alone for 7 days. Do not peel it off until then. Do not apply oils or lotions over it. You may shower with this dressing but do not soak it. Gently pat your wound dry when you get out of the shower. DO NOTs:  Do not rub your surgical wound  Do not put lotion or oils on your wound. Do not take a tub bath or go swimming until your doctor says it is ok. You may shower with this dressing over your wound. After showering pat the dressing dry. If you have staples a home health nurse will remove them in about 10 days. To increase and promote healing:    Stop Smoking (or at least cut back on       Smoking). Eat a well-balanced diet (high in protein       and vitamin C). If you have a poor appetite, drink Ensure, Glucerna, or [place] Instant Breakfast for the next 30 days. If you are diabetic, you should control your blood                                                sugars to prevent infection and help your wound                                                to heal.     Prevent Infection    Wash your hands. This is the most important thing you or your caregiver can do. Wash your hands with soap and water (or use the hand  we gave you) before you touch any wounds. 2. Shower. Use the antibacterial soap we gave you when you take a shower. Shower with this soap until your wounds are healed. 3.  Use clean sheets. Put freshly cleaned sheets on your bed after surgery. To keep the surgery site clean, do not allow pets to sleep with you while your wound is still healing. To prevent constipation, stay active and drink plenty of fluid. While using pain medications, you should also take stool softeners and laxatives, such as Pericolace       and Miralax.        If you are having too many bowel       Movements, then you may need       to stop taking the laxatives. You should have a bowel movement 3-4 days        after surgery and then at least every other day while       on pain medication. To improve your recovery, you must be active! Use your walker and take short walks         (in your home). about every 2 hours during the day. Try to increase how far you walk each day. You can put as much weight on your leg as you can tolerate while walking. Home health physical therapy will come to your       home the day after you leave the hospital.  The       therapist will visit about 4 times over the next couple of       weeks to teach you how to get out of bed, to safely walk       in your home, and to do your exercises. NO DRIVING until your surgeon tells you it is ok. You can return to work when cleared by a physician. Please call your physician immediately if you have:    Constant bleeding from your wound. Increasing redness or swelling around your wound (Some warmth, bruising and swelling is normal). Change in wound drainage (increase in amount, color, or bad smell). Change in mental status (unusual behavior  Temperature over 101.5 degrees Fahrenheit     Pain or redness in the calf (back of your lower leg - see picture)    Increased swelling of the thigh, ankle, calf, or foot. Emergency: CALL 911 if you have:    Shortness of breath    Chest pain when you cough or taking a deep breath    Please call your surgeons office at [phone]  for a follow up appointment.   _  You should call as soon as you get home or the next day after discharge. Ask to make an appointment in 2 weeks.

## 2022-10-14 NOTE — OP NOTES
OPERATIVE REPORT    FACILITY: [facility]    PATIENT NAME: Lisa Dillard     DATE OF OPERATION: 10/14/22    PREOPERATIVE DIAGNOSIS: left hip end stage osteoarthritis    POSTOPERATIVE DIAGNOSIS: same    OPERATIVE PROCEDURE:   1. left total hip arthroplasty - CPT 72982  2. Fluoroscopy, directed by and interpreted by surgeon - CPT 25252    ATTENDING PHYSICIAN: Gladys Vargas MD    ASSISTANT: Tomasa Grace (Performing all or most of the following assistant-at-surgery services including but not limited to: proper patient positioning, sterile/prep and draping, placement of instruments/trackers, operative exposure, minor portions of bone / soft tissue excision, final irrigation and debridement, deep and superficial closure, application of final dressings)    IMPLANTS:    Lavon Insignia size 3 std stem  Lavon Trident II Tritanium 50 mm multihole cup, 36 neutral liner  36 mm +0 Biolox Delta ceramic head    SPECIMENS:  none    OPERATIVE FINDINGS: Advanced arthritis of the operative hip    ANESTHESIA: Spinal + conscious sedation    FLUIDS:  Please see anesthesia record    ESTIMATED BLOOD LOSS: 300 cc    INDICATIONS FOR PROCEDURE:   Lisa Dillard is a 65 y.o. female who presented to clinic with left hip pain. Radiographs demonstrated advanced arthritic changes of the affected hip. They were counseled on the risks, benefits, and alternatives to surgery. Risks were outlined to include, but were not limited to: bleeding, infection, fracture, damage to blood vessels or nerves, hardware failure, loosening, continued pain, stiffness, leg length inequality, and medical risks including heart attack, stroke, blood clot, and even death. The patient elected to continue with the operation, and gave informed consent to do so. DETAILED DESCRIPTION OF PROCEDURE:   After anesthesia was induced, the patient was placed on the operative table. The patient's operative lower extremity was prepped and draped in the usual fashion.  Appropriate timeouts were performed verifying the correct patient, side, and operation. Preoperative antibiotics were administered. A small incision was first made on the contralateral ilium for placement of the pelvic arrays. These were securely fastened to bone and the ilium was registered. An incision was made over the operative hip two fingerbreadths distal and lateral to the ASIS extending in line towards the ipsilateral fibular head. Dissection was taken through skin and subcutaneous tissue. Hemostasis was obtained. The fascia overlying the tensor fascia pee was incised and developed medially over to the medial border of the TFL muscle belly by blunt dissection. The superior femoral neck was palpated and two cobra retractors were placed superior and inferior to the femoral neck. The ascending branches of the lateral femoral circumflex arteries were identified and coagulated. The capsule was identified and cleared of pericapsular fat. The capsulotomy was made and tagged with sutures. The femoral neck cut was made in keeping with the preoperative planned level and angle. The femoral head was removed with a corkscrew. Attention was turned to the acetabulum. Retractors were placed around it for exposure. The pulvinar and labrum was removed. The acetabulum was then registered, mapped, and verified for accuracy. The acetabulum was then reamed to size 50mm utilizing the robotic arm to maintain proper version and inclination. The final acetabular component was placed to the desired depth, inclination, and anteversion. Fluoroscopy was then utilized to verify proper cup placement. The liner was then inserted. Attention was then turned to the femur. The superior capsular flap was used to peel the superior capsule from the femoral neck and sequential releases were completed posteriorly and medially along the inside of the greater trochanter with care taken to avoid release of the abductors and obturator externus.  After releases allowed for adequate exposure of the proximal femur, retractors were placed on the medial and superolateral portions of the proximal femur. The canal was opened with a box osteotome and it was then sounded with a canal finder. Sequential broaching was completed up to size 3 stem. The calcar reamer was used to smooth rough bone edges down to the level of the broach. A trial neck and ball were placed and the hip was reduced. I felt that the combination of a std neck and +0 ball gave the best combination of offset, stability, leg lengths and restoration of hip mechanics based both on the fluoroscopic image the the robotic assessment. The trial components were removed and the final stem was placed. The trial ball was put on the stem and a size 36mm head was selected. The final head was impacted in line with the neck after cleaning and drying the trunion. The components were reduced and final fluoroscopy was completed to confirm appropriate component position without apparent complication. The wound was then irrigated and closed in layers. A #1 vicryl stitch was used to reapproximate the capsule, followed by a #2 Quill stitch for the fascia overlying the TFL. Subcutaneous closure was done in a layered fashion with 3-0 monocryl and dermabond for the final skin layer. A sterile dressing was applied. All sponge and needle counts were correct at the end of the case. The patient was awoken from the operation without complication and was transported to PACU in stable condition. All counts were correct at the conclusion of the case. DISPOSITION: Stable to PACU, X-rays to be completed in PACU    POSTOPERATIVE PLAN: The patient will begin same day postoperative physical therapy with mobilization. Postoperative pain control will be with PO and IV medications. DVT prophylaxis will be ASA 81 mg bid.  After the patient has mobilized appropriately and is deemed appropriate for discharge from PT, they will be discharged home.    FOLLOW UP: We will plan to see the patient back in clinic approximately 2 weeks after surgery for a wound check and follow up on clinical progress.

## 2022-10-14 NOTE — PROGRESS NOTES
CM to deliver rolling walker from [facility] to room for discharge  Spouse at bedside to transport. Transition of Care Plan:    RUR: 3% low risk  Disposition: Home with HH ([facility] has accepted)  Follow up appointments: Ortho  DME needed:  Minh Liriano ([facility])  Transportation at Discharge: Spouse  Keys or means to access home:   spouse     IM Medicare Letter:  1st IMM given on 10/14/2022  Is patient a  and connected with the [place]? N/a            If yes, was [place] transfer form completed and [facility] notified? Caregiver Contact:  Spouse, Joseph Spangler  ([phone])  Discharge Caregiver contacted prior to discharge? Spouse present in room and agreeable to discharge plan  Care Conference needed?:   n/a    Care Management Interventions  Support Systems: Spouse/Significant Other  Discharge Location  Patient Expects to be Discharged to[de-identified] Home with home health    Kalie Palomo.  Natalya Arnold, [facility] [place] - ED [facility]  [employer] ACP Facilitator  Zone Phone: [phone]

## 2022-10-14 NOTE — PROGRESS NOTES
Problem: Falls - Risk of  Goal: *Absence of Falls  Description: Document Dennie [place] Fall Risk and appropriate interventions in the flowsheet.   Outcome: Resolved/Met  Note: Fall Risk Interventions:                                Problem: Patient Education: Go to Patient Education Activity  Goal: Patient/Family Education  Outcome: Resolved/Met

## 2022-10-14 NOTE — H&P
HISTORY OF PRESENT ILLNESS  Chief Complaint: Pain of the Left Hip     Age: 65 y.o. Sex: female      History of present illness: Ms. Berny Montana presents today left hip total hip arthroplasty. She has severe OA and has failed conservative treatment. No changes since her last clinic visit. The past medical history, social history, medications and allergies have all been updated and reviewed. OBJECTIVE  Constitutional:  No acute distress. Her body mass index is 26.75 kg/m². Eyes:  Sclera are nonicteric. Respiratory:  No labored breathing. Cardiovascular:  RRR, No marked edema. Skin:  No marked skin ulcers. Neurological:  No marked sensory loss noted. Psychiatric: Alert and oriented x3. left HIP EXAM:  GAIT- antalgic  APPEARANCE- No swelling, redness or inflammation, benign  ROM- Limited:  Internal rotation (0~15/20 degrees) and External rotation (0~15/20 degrees) with pain in the groin region, Limited abduction (0-15/20 degrees), adduction (0-15/20 degrees) and flexion ( -5-95 degrees)  STRENGTH/FUNCTION- 4/5 abduction, 5/5 adduction, 5/5 flexion, 5/5 extension  PERFUSION/SENSATION- Intact to dorsiflexion and plantar flexion with normal sensation to light touch in all dermatomes distally. Good capillary reveal in the distal low extremity. There is a palpable dorsalis pedis pulse. TESTS- Equivocal log roll, Positive stinchfield, Positive impingement, Negative TTP over greater trochanter        IMAGING / STUDIES   Order: XR HIP LEFT 2-3 VIEWS W/ PELVIS WHEN PERFORMED - Indication:   Primary osteoarthritis of left hip  Order: XR PELVIS 1 OR 2 VW - Indication: Primary osteoarthritis of left   hip      X-ray cervical spine complete 4 or 5 views ([id number])     Result Date: 9/29/2022  Standing. AP, Lateral, Flexion, Extension. Impression: X-rays of the cervical spine show degenerative changes throughout but no fractures or dislocations.       X-ray lumbar spine complete 4+ views ([id number]) Result Date: 9/29/2022  Shielding: N/A. Standing. AP, Lat, Flexion, Extension. Impression: X-rays of the lumbar spine show degenerative changes throughout. Retrolisthesis L1-L4. Spondylolisthesis L4-L5. Severe hip osteoarthritis on the left. X-ray hip left 2-3 views w/pelvis when performed ([id number])     Result Date: 10/3/2022  Standing. AP Pelvis, Lat. Impression: Today's exam shows evidence of joint space narrowing, osteophyte formation, and subchondral sclerosis, all consistent with degenerative arthritis of the hip. No other significant findings are noted. Impression: Degenerative Arthritis, left Hip      X-ray pelvis 1 or 2 views ([id number])     Result Date: 10/3/2022  Standing/Sitting Lateral.      Impression: Sitting and standing lateral demonstrate approximately 7 degree change in sacral slope between sitting and standing. This constitutes significantly reduced spinopelvic mobility. No fractures or dislocations present. ASSESSMENT  1. Primary osteoarthritis of left hip    2. Acquired deformity of left hip          Impression: 65 y.o. female with left hip pain with severe OA      PLAN  We reviewed the patient's imaging, diagnosis, and pathology in detail. Ultimately they have symptomatic degenerative joint disease, management for which depends on the degree of symptomatolgy. We talked about treatment options. They understand the options boil down to conservative measures versus joint replacement surgery. We discussed a comprehensive non-operative protocol including activity modification, bracing/compression, ice or heat, acetaminophen, NSAIDs, gait aids, low impact exercise such as walking, stationary bike, and swimming, and the importance of maintaining an appropriate body weight. We also discussed injections for symptomatic relief. Finally, we discussed what joint replacement involves in terms of surgery as well as recuperation, pros, cons and risks and benefits.  We discussed specific risks in detail including infection, wound healing issues, limb length discrepancy, dislocation, swelling, stiffness, bleeding, fracture, damage to other structures, limp, need for revision, blood clots, continued pain, need for assistive devices, and medical/anesthetic complications including death. The patient is an acceptable candidate for joint replacement. This patient's pain is consistent with the osteoarthritis that is seen on x-ray. The patient has failed conservative management and is interested in having definitive treatment by way of total hip arthroplasty. The pain is affecting the patient's ADLs and desired lifestyle, therefore, the patient appears to be a reasonable candidate for total hip arthroplasty. Further non-operative management is futile and will delay the most appropriate management for this patient's bothersome severe DJD. Plan for left THANH today.

## 2022-10-19 NOTE — DISCHARGE SUMMARY
Ortho Discharge Summary    Patient ID:  Yue Caceres  [id number]  female  65 y.o.  1945    Admit date: 10/14/2022    Discharge date: 10/19/2022    Admitting Physician: Yuli Mcadams MD     Consulting Physician(s):   Treatment Team: Utilization Review: Santi Crandall RN    Date of Surgery:   10/14/2022     Preoperative Diagnosis:  LEFT HIP OSTEOARTHRITIS    Postoperative Diagnosis:   LEFT HIP OSTEOARTHRITIS    Procedure(s):   LEFT TOTAL HIP ARTHROPLASTY, MAKOPLASTY     Anesthesia Type:   Spinal     Surgeon: Jett Lewis MD                            HPI:  Pt is a 65 y.o. female who has a history of LEFT HIP OSTEOARTHRITIS  with pain and limitations of activities of daily living who presents at this time for a left LEFT TOTAL HIP Odette Grand following the failure of conservative management. 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 Karoline Lee    History of palpitations     Hydronephrosis of left kidney 10/2022    Hypercholesterolemia     Hypertension 08/21/2017    Dr. Jefry Samaniego    Internal hemorrhoid 08/21/2017    Lumbar spinal stenosis 09/20/2017    S/P radiation therapy 2009    TMJ dysfunction 04/05/2018    Vitamin D deficiency 08/21/2017       Body mass index is 27.18 kg/m². : A BMI > 30 is classified as obesity and > 40 is classified as morbid obesity.      Medications upon admission :   Prior to Admission Medications Prescriptions Last Dose Informant Patient Reported? Taking? MULTIVITAMIN (MULTIPLE VITAMIN PO) 10/9/2022  Yes No   Sig: Take 1 Tablet by mouth daily. aspirin delayed-release 81 mg tablet 10/9/2022  Yes No   Sig: Take 81 mg by mouth daily. atorvastatin (LIPITOR) 10 mg tablet 10/13/2022 at PM  No Yes   Sig: Take 1 Tablet by mouth nightly for 360 days. benazepriL (LOTENSIN) 20 mg tablet 10/14/2022 at AM  No Yes   Sig: Take 1 Tablet by mouth daily for 360 days. denosumab (PROLIA) 60 mg/mL injection 2022  Yes No   Si mg by SubCUTAneous route every 6 months. hydroCHLOROthiazide (HYDRODIURIL) 25 mg tablet 10/14/2022 at AM  No Yes   Sig: Take 1 Tablet by mouth daily for 360 days. metoprolol succinate (TOPROL-XL) 50 mg XL tablet 10/14/2022 at AM  Yes Yes   Sig: Take 50 mg by mouth daily. naproxen sod/diphenhydramine (ALEVE PM PO) 2022  Yes No   Sig: Take  by mouth nightly as needed. omeprazole (PRILOSEC) 20 mg capsule 10/14/2022 at AM  Yes Yes   Sig: Take 20 mg by mouth daily. zinc 50 mg tab tablet 10/9/2022  Yes No   Sig: Take 25 mg by mouth daily. Take 1/2 tablet      Facility-Administered Medications: None        Allergies: Allergies   Allergen Reactions    Nitrous Oxide Vertigo    Sulfa (Sulfonamide Antibiotics) Nausea Only        Hospital Course: The patient underwent surgery. Complications:  None; patient tolerated the procedure well. Was taken to the PACU in stable condition and then transferred to the ortho floor. Perioperative Antibiotics:  Ancef     Postoperative Pain Management:  Oxycodone & Tylenol     DVT Prophylaxis: Aspirin 81bid    Postoperative transfusions:    Number of units banked PRBCs =   none     Post Op complications: none    Hemoglobin at discharge:    Lab Results   Component Value Date/Time    HGB 14.0 10/10/2022 07:52 AM    INR 1.0 10/10/2022 07:52 AM       Dressing was changed on POD # 0. Incision - clean, dry and intact.  No significant erythema or swelling. Wound appears to be healing without any evidence of infection. Neurovascular exam found to be within normal limits. Physical Therapy started following surgery and participated in bed mobility, transfers and ambulation. Gait:  Gait  Speed/Ines: Pace decreased (<100 feet/min)  Step Length: Right shortened, Left shortened  Gait Abnormalities: Decreased step clearance  Ambulation - Level of Assistance: Modified independent  Distance (ft): 150 Feet (ft)  Assistive Device: Gait belt, Walker, rolling                   Discharged to: Home. Condition on Discharge:   good    Discharge instructions:  - Anticoagulate with Aspirin 81 bid  - Take pain medications as prescribed  - Resume pre hospital diet      - Discharge activity: activity as tolerated  - Ambulate with assistive device as needed. - Weight bearing status wBAT  - Wound Care Keep wound clean and dry. See discharge instruction sheet. -DISCHARGE MEDICATION LIST     Discharge Medication List as of 10/14/2022  6:09 PM        START taking these medications    Details   oxyCODONE IR (ROXICODONE) 5 mg immediate release tablet Take 1 Tablet by mouth every four (4) hours as needed for Pain for up to 7 days. Max Daily Amount: 30 mg., Normal, Disp-28 Tablet, R-0      polyethylene glycol (MIRALAX) 17 gram packet Take 1 Packet by mouth daily as needed (constipation) for up to 15 days. , Normal, Disp-15 Packet, R-0      senna-docusate (PERICOLACE) 8.6-50 mg per tablet Take 1 Tablet by mouth daily. , Normal, Disp-30 Tablet, R-0           CONTINUE these medications which have CHANGED    Details   aspirin delayed-release 81 mg tablet Take 1 Tablet by mouth two (2) times a day for 30 days. , Normal, Disp-60 Tablet, R-0           CONTINUE these medications which have NOT CHANGED    Details   omeprazole (PRILOSEC) 20 mg capsule Take 20 mg by mouth daily. , Historical Med      metoprolol succinate (TOPROL-XL) 50 mg XL tablet Take 50 mg by mouth daily., Historical Med      zinc 50 mg tab tablet Take 25 mg by mouth daily. Take 1/2 tablet, Historical Med      benazepriL (LOTENSIN) 20 mg tablet Take 1 Tablet by mouth daily for 360 days. , Normal, Disp-90 Tablet, R-3      hydroCHLOROthiazide (HYDRODIURIL) 25 mg tablet Take 1 Tablet by mouth daily for 360 days. , Normal, Disp-90 Tablet, R-3      atorvastatin (LIPITOR) 10 mg tablet Take 1 Tablet by mouth nightly for 360 days. , Normal, Disp-90 Tablet, R-3      denosumab (PROLIA) 60 mg/mL injection 60 mg by SubCUTAneous route every 6 months., Historical Med      MULTIVITAMIN (MULTIPLE VITAMIN PO) Take 1 Tablet by mouth daily. , Historical Med           STOP taking these medications       naproxen sod/diphenhydramine (ALEVE PM PO) Comments:   Reason for Stopping:            per medical continuation form      -Follow up in office in 2 weeks        10/19/2022  4:00 PM

## 2022-10-23 NOTE — PROGRESS NOTES
ICD-10-CM ICD-9-CM    1. Hydronephrosis with ureteropelvic junction (UPJ) obstruction  Q62.11 591       2. Routine adult health maintenance  Z00.00 V70.0       3. Prediabetes  R73.03 790.29       4. Hypercholesterolemia  E78.00 272.0       5. Essential hypertension  I10 401.9       6. ASCVD (arteriosclerotic cardiovascular disease)  I25.10 429.2      440.9       7. Encounter for immunization  Z23 V03.89 INFLUENZA, FLUAD, (AGE 72 Y+), IM, PF, 0.5 ML               Subjective:     Chief Complaint   Patient presents with    Follow-up       Erich Henning is a 65 y.o. F. She has a history of osteoporosis, atherosclerosis, and breast cancer. I reviewed and updated the medical record. I last saw this patient in early October for a preoperative evaluation prior to pending total hip arthroplasty. She was feeling well at the time but had expressed concern about an incidental finding of hydronephrosis noted on a recent CT. Physical examination had been unremarkable. She was felt to be okay to proceed with the surgery and a CT abdomen was ordered for further evaluation of the findings of hydronephrosis. Today, the patient comes in for a routine postoperative evaluation. She reports feeling great overall today and has no significant acute somatic complaints. She did have some GI distress last night, which he thinks was led to food that she had eaten, and had taken some Pepto-Bismol. As result, this morning, she had noted that her stool was quite dark, and had initially been concerned about this until she had looked up the side effects of Pepto-Bismol on the Internet, and I also subsequently had a normal bowel movement several hours later. She denies having had any hematochezia associate with this.   She also inquires as to whether or not she needs to follow through with the CT of her abdomen that was ordered previously for findings of hydronephrosis; she denies having had any recent flank pain, fevers, chills, hematuria, or dysuria. Of note, during her previous surgery, her laboratory studies are reviewed, and were noted to be unremarkable. She says that the joint pain that she had been having before in her hip has now completely resolved, although she continues to have a cane just for \"security\". She denies having had any other recent falls or other limitations in her ability to walk. She otherwise continues on atorvastatin 10 mg daily, benazepril 20 mg daily, metoprolol 50 mg daily, and aspirin. She has been taking an extra dose of the aspirin at the direction of orthopedics, and denies having had any other bleeding or bruising sequelae. Her review of systems is otherwise negative. Routine Healthcare Maintenance issues are reviewed and discussed with the patient as noted below. Orders to update gaps in healthcare maintenance were placed as noted below in the Assessment and Plan, where applicable.      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 Bette Kevin    History of palpitations     Hydronephrosis of left kidney 10/2022    CT Finding (incidental)    Hypercholesterolemia     Hypertension 08/21/2017    Dr. Criselda Blankenship    Internal hemorrhoid 08/21/2017    Lumbar spinal stenosis 09/20/2017    Prediabetes 10/2022    S/P radiation therapy 2009    TMJ dysfunction 04/05/2018    Vitamin D deficiency 08/21/2017       Past Surgical Histor:  Past Surgical History: Procedure Laterality Date    HX BREAST BIOPSY Right 2008    positive ultrasound core bx    HX BREAST BIOPSY Bilateral 2008    benign mri bx    HX BREAST LUMPECTOMY Right 2009    x2    HX BUNIONECTOMY Bilateral     HX CATARACT REMOVAL Bilateral 2020    HX COLONOSCOPY      HX DILATION AND CURETTAGE      HX TONSILLECTOMY      HX TUBAL LIGATION  1980       Allergies: Allergies   Allergen Reactions    Nitrous Oxide Vertigo    Sulfa (Sulfonamide Antibiotics) Nausea Only       Medications:  Current Outpatient Medications   Medication Sig Dispense Refill    aspirin delayed-release 81 mg tablet Take 1 Tablet by mouth two (2) times a day for 30 days. 60 Tablet 0    senna-docusate (PERICOLACE) 8.6-50 mg per tablet Take 1 Tablet by mouth daily. 30 Tablet 0    omeprazole (PRILOSEC) 20 mg capsule Take 20 mg by mouth daily. metoprolol succinate (TOPROL-XL) 50 mg XL tablet Take 50 mg by mouth daily. zinc 50 mg tab tablet Take 25 mg by mouth daily. Take 1/2 tablet      benazepriL (LOTENSIN) 20 mg tablet Take 1 Tablet by mouth daily for 360 days. 90 Tablet 3    hydroCHLOROthiazide (HYDRODIURIL) 25 mg tablet Take 1 Tablet by mouth daily for 360 days. 90 Tablet 3    atorvastatin (LIPITOR) 10 mg tablet Take 1 Tablet by mouth nightly for 360 days. 90 Tablet 3    denosumab (PROLIA) 60 mg/mL injection 60 mg by SubCUTAneous route every 6 months. MULTIVITAMIN (MULTIPLE VITAMIN PO) Take 1 Tablet by mouth daily.          Social History:  Social History     Socioeconomic History    Marital status:     Number of children: 3   Tobacco Use    Smoking status: Never    Smokeless tobacco: Never   Vaping Use    Vaping Use: Never used   Substance and Sexual Activity    Alcohol use: No    Drug use: Never       Family History:  Family History   Problem Relation Age of Onset    COPD Mother     Stroke Father     Breast Cancer Sister 61    Heart Disease Sister     Heart Disease Sister     Heart Disease Paternal Uncle Immunizations:  Immunization History   Administered Date(s) Administered    COVID-19, PFIZER PURPLE top, DILUTE for use, (age 15 y+), IM, 30mcg/0.3mL 03/01/2021, 03/22/2021, 11/30/2021    Influenza High Dose Vaccine PF 09/18/2018, 09/20/2019, 09/15/2020, 09/30/2021    Influenza Vaccine 10/01/2014, 10/25/2017    Influenza, FLUAD, (age 72 y+), Adjuvanted 11/01/2022    Pneumococcal Conjugate (PCV-13) 09/10/2015    Pneumococcal Vaccine (Unspecified Type) 10/01/2013    Tdap 06/29/2022        Healthcare Maintenance:  Health Maintenance   Topic Date Due    Shingrix Vaccine Age 50> (1 of 2) Never done    COVID-19 Vaccine (4 - Booster for Johnson Moreno series) 01/25/2022    Medicare Yearly Exam  08/26/2023    Lipid Screen  08/30/2023    Depression Screen  11/01/2023    DTaP/Tdap/Td series (2 - Td or Tdap) 06/29/2032    Hepatitis C Screening  Completed    Flu Vaccine  Completed    Pneumococcal 65+ years  Completed        Review of Systems:  ROS:  ROS   ROS otherwise negative      Objective:     Vital Signs:  Visit Vitals  /80 (BP 1 Location: Left upper arm, BP Patient Position: Sitting, BP Cuff Size: Adult)   Pulse 70   Resp 18   Ht 5' 4.5\" (1.638 m)   Wt 153 lb 11.2 oz (69.7 kg)   LMP  (LMP Unknown)   SpO2 98%   BMI 25.98 kg/m²       BMI:  Body mass index is 25.98 kg/m². Physical Examination:  Physical Exam  Constitutional:       Appearance: Normal appearance. She is normal weight. HENT:      Head: Normocephalic and atraumatic. Right Ear: External ear normal.      Left Ear: External ear normal.      Nose: Nose normal.      Mouth/Throat:      Mouth: Mucous membranes are moist.      Pharynx: Oropharynx is clear. No oropharyngeal exudate or posterior oropharyngeal erythema. Cardiovascular:      Rate and Rhythm: Normal rate and regular rhythm. Pulses: Normal pulses. Heart sounds: Normal heart sounds. No murmur heard. No friction rub. No gallop.       Comments: Regular with frequent pauses, no irregularity, similar to prior examinations  Pulmonary:      Effort: Pulmonary effort is normal. No respiratory distress. Breath sounds: Normal breath sounds. No wheezing, rhonchi or rales. Abdominal:      General: Abdomen is flat. Bowel sounds are normal. There is no distension. Palpations: Abdomen is soft. Tenderness: There is no abdominal tenderness. There is no guarding. Musculoskeletal:         General: No swelling, tenderness or deformity. Normal range of motion. Cervical back: Normal range of motion and neck supple. No rigidity or tenderness. Skin:     General: Skin is warm and dry. Findings: No erythema, lesion or rash. Neurological:      General: No focal deficit present. Mental Status: She is alert and oriented to person, place, and time. Mental status is at baseline. Sensory: No sensory deficit. Motor: No weakness.       Gait: Gait normal.      Deep Tendon Reflexes: Reflexes normal.   Psychiatric:         Mood and Affect: Mood normal.         Behavior: Behavior normal.         Judgment: Judgment normal.        Physical exam otherwise negative    Diagnostic Testing:    Laboratory Studies:  Hospital Outpatient Visit on 10/10/2022   Component Date Value Ref Range Status    WBC 10/10/2022 6.9  3.6 - 11.0 K/uL Final    RBC 10/10/2022 4.43  3.80 - 5.20 M/uL Final    HGB 10/10/2022 14.0  11.5 - 16.0 g/dL Final    HCT 10/10/2022 42.3  35.0 - 47.0 % Final    MCV 10/10/2022 95.5  80.0 - 99.0 FL Final    MCH 10/10/2022 31.6  26.0 - 34.0 PG Final    MCHC 10/10/2022 33.1  30.0 - 36.5 g/dL Final    RDW 10/10/2022 12.3  11.5 - 14.5 % Final    PLATELET 60/99/4636 729  150 - 400 K/uL Final    MPV 10/10/2022 10.2  8.9 - 12.9 FL Final    NRBC 10/10/2022 0.0  0  WBC Final    ABSOLUTE NRBC 10/10/2022 0.00  0.00 - 0.01 K/uL Final    INR 10/10/2022 1.0  0.9 - 1.1   Final    A single therapeutic range for Vit K antagonists may not be optimal for all indications - see June, 2008 issue of Chest, American College of Chest Physicians Evidence-Based Clinical Practice Guidelines, 8th Edition. Prothrombin time 10/10/2022 10.6  9.0 - 11.1 sec Final    Color 10/10/2022 YELLOW/STRAW    Final    Color Reference Range: Straw, Yellow or Dark Yellow    Appearance 10/10/2022 CLEAR  CLEAR   Final    Specific gravity 10/10/2022 1.009    Final    pH (UA) 10/10/2022 6.0  5.0 - 8.0   Final    Protein 10/10/2022 Negative  NEG mg/dL Final    Glucose 10/10/2022 Negative  NEG mg/dL Final    Ketone 10/10/2022 Negative  NEG mg/dL Final    Bilirubin 10/10/2022 Negative  NEG   Final    Blood 10/10/2022 Negative  NEG   Final    Urobilinogen 10/10/2022 0.2  0.2 - 1.0 EU/dL Final    Nitrites 10/10/2022 Negative  NEG   Final    Leukocyte Esterase 10/10/2022 TRACE (A)  NEG   Final    UA:UC IF INDICATED 10/10/2022 CULTURE NOT INDICATED BY UA RESULT    Final    WBC 10/10/2022 0-4  0 - 4 /hpf Final    RBC 10/10/2022 0-5  0 - 5 /hpf Final    Epithelial cells 10/10/2022 FEW  FEW /lpf Final    Epithelial cell category consists of squamous cells and /or transitional urothelial cells. Renal tubular cells, if present, are separately identified as such. Bacteria 10/10/2022 Negative  NEG /hpf Final    Hyaline cast 10/10/2022 0-2  0 - 2 /lpf Final    Hemoglobin A1c 10/10/2022 5.9 (A)  4.0 - 5.6 % Final    Comment: NEW METHOD  PLEASE NOTE NEW REFERENCE RANGE  (NOTE)  HbA1C Interpretive Ranges  <5.7              Normal  5.7 - 6.4         Consider Prediabetes  >6.5              Consider Diabetes      Est. average glucose 10/10/2022 123  mg/dL Final    Special Requests: 10/10/2022 NO SPECIAL REQUESTS    Final    Culture result: 10/10/2022 MRSA NOT PRESENT    Final    Culture result: 10/10/2022     Final                    Value:Screening of patient nares for MRSA is for surveillance purposes and, if positive, to facilitate isolation considerations in high risk settings.  It is not intended for automatic decolonization interventions per se as regimens are not sufficiently effective to warrant routine use. Sodium 10/10/2022 138  136 - 145 mmol/L Final    Potassium 10/10/2022 4.0  3.5 - 5.1 mmol/L Final    Chloride 10/10/2022 102  97 - 108 mmol/L Final    CO2 10/10/2022 32  21 - 32 mmol/L Final    Anion gap 10/10/2022 4 (A)  5 - 15 mmol/L Final    Glucose 10/10/2022 88  65 - 100 mg/dL Final    BUN 10/10/2022 17  6 - 20 MG/DL Final    Creatinine 10/10/2022 0.77  0.55 - 1.02 MG/DL Final    BUN/Creatinine ratio 10/10/2022 22 (A)  12 - 20   Final    eGFR 10/10/2022 >60  >60 ml/min/1.73m2 Final    Comment:      Pediatric calculator link: [email or web address] org/professionals/kdoqi/gfr_calculatorped       Effective Oct 3, 2022       These results are not intended for use in patients <25years of age. eGFR results are calculated without a race factor using  the 2021 CKD-EPI equation. Careful clinical correlation is recommended, particularly when comparing to results calculated using previous equations. The CKD-EPI equation is less accurate in patients with extremes of muscle mass, extra-renal metabolism of creatinine, excessive creatine ingestion, or following therapy that affects renal tubular secretion. Calcium 10/10/2022 9.6  8.5 - 10.1 MG/DL Final    Bilirubin, total 10/10/2022 0.8  0.2 - 1.0 MG/DL Final    ALT (SGPT) 10/10/2022 36  12 - 78 U/L Final    AST (SGOT) 10/10/2022 20  15 - 37 U/L Final    Alk.  phosphatase 10/10/2022 65  45 - 117 U/L Final    Protein, total 10/10/2022 7.4  6.4 - 8.2 g/dL Final    Albumin 10/10/2022 3.6  3.5 - 5.0 g/dL Final    Globulin 10/10/2022 3.8  2.0 - 4.0 g/dL Final    A-G Ratio 10/10/2022 0.9 (A)  1.1 - 2.2   Final    Crossmatch Expiration 10/10/2022 10/17/2022,2359   Final    ABO/Rh(D) 10/10/2022 O NEGATIVE   Final    Antibody screen 10/10/2022 NEG   Final   Appointment on 08/30/2022   Component Date Value Ref Range Status    Vitamin D 25-Hydroxy 08/30/2022 43.4  30 - 100 ng/mL Final Comment: (NOTE)  Deficiency               <20 ng/mL  Insufficiency          20-30 ng/mL  Sufficient             ng/mL  Possible toxicity       >100 ng/mL    The Method used is Siemens Advia Centaur currently standardized to a   Center of Disease Control and Prevention (CDC) certified reference   [facility]. Samples containing fluorescein dye can produce falsely   elevated values when tested with the ADVIA Centaur Vitamin D Assay. It is recommended that results in the toxic range, >100 ng/mL, be   retested 72 hours post fluorescein exposure. Cholesterol, total 08/30/2022 150  <200 MG/DL Final    Triglyceride 08/30/2022 64  <150 MG/DL Final    Based on NCEP-ATP III:  Triglycerides <150 mg/dL  is considered normal, 150-199 mg/dL  borderline high,  200-499 mg/dL high and  greater than or equal to 500 mg/dL very high. HDL Cholesterol 08/30/2022 58  MG/DL Final    Based on NCEP ATP III, HDL Cholesterol <40 mg/dL is considered low and >60 mg/dL is elevated.     LDL, calculated 08/30/2022 79.2  0 - 100 MG/DL Final    Comment: Based on the NCEP-ATP: LDL-C concentrations are considered  optimal <100 mg/dL,  near optimal/above Normal 100-129 mg/dL  Borderline High: 130-159, High: 160-189 mg/dL  Very High: Greater than or equal to 190 mg/dL      VLDL, calculated 08/30/2022 12.8  MG/DL Final    CHOL/HDL Ratio 08/30/2022 2.6  0.0 - 5.0   Final    Sodium 08/30/2022 138  136 - 145 mmol/L Final    Potassium 08/30/2022 4.4  3.5 - 5.1 mmol/L Final    Chloride 08/30/2022 101  97 - 108 mmol/L Final    CO2 08/30/2022 31  21 - 32 mmol/L Final    Anion gap 08/30/2022 6  5 - 15 mmol/L Final    Glucose 08/30/2022 85  65 - 100 mg/dL Final    BUN 08/30/2022 17  6 - 20 MG/DL Final    Creatinine 08/30/2022 0.71  0.55 - 1.02 MG/DL Final    BUN/Creatinine ratio 08/30/2022 24 (A)  12 - 20   Final    GFR est AA 08/30/2022 >60  >60 ml/min/1.73m2 Final    GFR est non-AA 08/30/2022 >60  >60 ml/min/1.73m2 Final    Estimated GFR is calculated using the IDMS-traceable Modification of Diet in Renal Disease (MDRD) Study equation, reported for both  Americans (GFRAA) and non- Americans (GFRNA), and normalized to 1.73m2 body surface area. The physician must decide which value applies to the patient. Calcium 08/30/2022 9.2  8.5 - 10.1 MG/DL Final    Bilirubin, total 08/30/2022 0.8  0.2 - 1.0 MG/DL Final    ALT (SGPT) 08/30/2022 37  12 - 78 U/L Final    AST (SGOT) 08/30/2022 24  15 - 37 U/L Final    Alk. phosphatase 08/30/2022 62  45 - 117 U/L Final    Protein, total 08/30/2022 6.9  6.4 - 8.2 g/dL Final    Albumin 08/30/2022 3.8  3.5 - 5.0 g/dL Final    Globulin 08/30/2022 3.1  2.0 - 4.0 g/dL Final    A-G Ratio 08/30/2022 1.2  1.1 - 2.2   Final    WBC 08/30/2022 7.3  3.6 - 11.0 K/uL Final    RBC 08/30/2022 4.38  3.80 - 5.20 M/uL Final    HGB 08/30/2022 14.0  11.5 - 16.0 g/dL Final    HCT 08/30/2022 42.5  35.0 - 47.0 % Final    MCV 08/30/2022 97.0  80.0 - 99.0 FL Final    MCH 08/30/2022 32.0  26.0 - 34.0 PG Final    MCHC 08/30/2022 32.9  30.0 - 36.5 g/dL Final    RDW 08/30/2022 13.0  11.5 - 14.5 % Final    PLATELET 14/62/1642 139  150 - 400 K/uL Final    MPV 08/30/2022 10.4  8.9 - 12.9 FL Final    NRBC 08/30/2022 0.0  0  WBC Final    ABSOLUTE NRBC 08/30/2022 0.00  0.00 - 0.01 K/uL Final    NEUTROPHILS 08/30/2022 56  32 - 75 % Final    LYMPHOCYTES 08/30/2022 29  12 - 49 % Final    MONOCYTES 08/30/2022 10  5 - 13 % Final    EOSINOPHILS 08/30/2022 4  0 - 7 % Final    BASOPHILS 08/30/2022 1  0 - 1 % Final    IMMATURE GRANULOCYTES 08/30/2022 0  0.0 - 0.5 % Final    ABS. NEUTROPHILS 08/30/2022 4.2  1.8 - 8.0 K/UL Final    ABS. LYMPHOCYTES 08/30/2022 2.1  0.8 - 3.5 K/UL Final    ABS. MONOCYTES 08/30/2022 0.7  0.0 - 1.0 K/UL Final    ABS. EOSINOPHILS 08/30/2022 0.3  0.0 - 0.4 K/UL Final    ABS. BASOPHILS 08/30/2022 0.1  0.0 - 0.1 K/UL Final    ABS. IMM.  GRANS. 08/30/2022 0.0  0.00 - 0.04 K/UL Final    DF 08/30/2022 AUTOMATED    Final Office Visit on 08/25/2022   Component Date Value Ref Range Status    Microalbumin,urine random 08/25/2022 <0.50  MG/DL Final    No reference range has been established. Creatinine, urine random 08/25/2022 20.80  mg/dL Final    No reference range has been established. Microalbumin/Creat ratio (mg/g cre* 08/25/2022 <24  0 - 30 mg/g Final         Radiographic Studies:  XR Results (most recent):  Results from Hospital Encounter encounter on 10/14/22    XR HIP LT DURING OR PROC    Narrative  INDICATION: Surgery    EXAM: XR HIP LT DURING OR PROC    FINDINGS: Portable imaging utilized during procedure. Left hip arthroplasty. Impression  Portable imaging during procedure. REPORT PROVIDED FOR COMPLIANCE ONLY AT NO CHARGE. [place] [facility] Results (most recent):  Results from Hospital Encounter encounter on 06/20/22    [place] [facility] MAMMO BI SCREENING INCL CAD    Narrative  STUDY: Bilateral digital screening mammogram    INDICATION:  Screening. Right breast cancer 2009. COMPARISON: 2021 and prior. BREAST COMPOSITION:  There are scattered areas of fibroglandular density. FINDINGS: Bilateral digital screening mammography was performed and is  interpreted in conjunction with a computer assisted detection (CAD) system. No  suspicious masses or calcifications are identified. There has been no  significant change including right treatment. .    Impression  BI-RADS 2: Benign. No mammographic evidence of malignancy. RECOMMENDATIONS:  Next screening mammogram is recommended in one year. The patient will be notified of these results. CT Results (most recent):  Results from Hospital Encounter encounter on 10/04/22    CT LOW EXT LT WO CONT    Narrative  EXAM: CT LOW EXT LT WO CONT    INDICATION: Left hip with Farshad plasty protocol    COMPARISON: CT chest abdomen and pelvis 2/20/2012    TECHNIQUE: Helical CT of the bilateral lower extremities with attention to the  left hip with coronal and sagittal reformats.  Images reviewed in soft tissue and  bone windows. CT dose reduction was achieved through the use of a standardized  protocol tailored for this examination and automatic exposure control for dose  modulation. CONTRAST: None. FINDINGS: Bones: No fracture, dislocation, or periosteal reaction. Possible Left  femoral head avascular necrosis with subtle subchondral collapse    Joint fluid: Moderate left hip joint effusion. Small right hip joint effusion. Articulations: Marked facet arthropathy in the lower lumbosacral spine. Moderate  left and mild right sacroiliac joint osteoarthritis. Marked pubic symphysis  arthropathy. Severe left hip osteoarthritis. Moderate right hip osteoarthritis. Degenerative changes involving the bilateral knees are incompletely evaluated. Tendons: No definite full-thickness tendon tear. Patchy mineralization of the  bilateral hamstring origins. Muscles: No intramuscular hematoma. No focal atrophy. Soft tissues: No appreciable mass. Other: Atherosclerosis. Diverticulosis coli. Partially visualized Chronic left  ureteropelvic junction obstruction with marked hydronephrosis. Impression  1. Severe left hip osteoarthritis. Possible left femoral head avascular  necrosis with subtle subchondral collapse. 2.  Partially visualized Chronic left ureteropelvic junction obstruction with  marked hydronephrosis. DEXA Results (most recent):  Results from Hospital Encounter encounter on 02/23/22    DEXA BONE DENSITY STUDY AXIAL    Narrative  Bone Mineral Density    Indication: Monitoring, Prolia therapy. Age: 67.  Sex: Female. Menopause status: postmenopausal.. Hormone replacement therapy: No.    Number of falls in the past year: None. .  Risk factors for osteoporosis: None. .    Current medication for osteoporosis: Prolia. Comparison: 2019    Technique: Imaging was performed on the [employer].   World Health Organization  meta-analysis fracture risk calculator (FRAX) analysis was performed for 10 year  fracture risk probability assessment    Excluded sites: Lumbar spine due to degenerative changes    Findings:        Femoral Neck Right: . Bone mineral density (gm/cm2): 0.845.  % of peak bone mass: 81.  % for age matched controls:  110. T-score: -1.4.  Z-score: 0.6. Total Hip Left: . Bone mineral density (gm/cm2): 0.865.  % of peak bone mass: 83.  % for age matched controls:  80. T-score: -1.1. Z-score: 0.7.    33% Radius Left: . Bone mineral density (gm/cm2): 0.538.  % of peak bone mass: 61.  % for age matched controls:  80.  T-score: -3.9.  Z-score: -1.5. Impression  Impression: This patient is osteoporotic using the World Health Organization criteria  As compared to the prior study, there has been a statistically significant  increase in bone mineral density. 10 year probability of major osteoporotic fracture: 12.3%. 10 year probability of hip fracture: 2.6%. Recommendations:  Therapy recommendations need to be tailored to each individual patient. Using  the [facility] FRAX absolute fracture algorithm, the  1 [facility] recommends beginning pharmacological therapy in  postmenopausal women and men over the age of 48 with a 8 year probability of a  hip fracture of >3% OR with the 10 year probability of a major osteoporotic  fracture of >20%. Please reconsider testing based on risk factors. Currently, Medicare will only  reimburse for a central DXA examination every two years, unless the patient is  on chronic glucocorticoid therapy. Note: Please note that reliable, valid comparisons cannot be made between  studies which have been performed on machines from different manufacturers. If  clinically warranted, a follow up study performed at this site, on the same  unit, would allow the most sensitive assessment of change in bone mineral  density.      MRI Results (most recent):  Results from [place] encounter on 08/18/21    MRI CERV SPINE WO CONT    Narrative  EXAM: MRI CERV SPINE WO CONT    INDICATION: Neck pain; Chronic neck pain duration >= 3 months; Chronic neck  pain, no complicating feature; Worsening or not improving; Adequate conservative  therapy; No known/automatically detected potential contraindications to MRI. Other cervical disc degeneration, unspecified cervical region / Right neck pain,  DDD, spondylosis    COMPARISON: Cervical spine radiographs 8/13/2021    TECHNIQUE: MR imaging of the cervical spine was performed using the following  sequences: sagittal T1, T2, STIR;  axial T2, T1.    CONTRAST:  None. FINDINGS:    Stepwise mild anterolisthesis from C3-C5. Reversal of the normal cervical  lordosis centered at about C4-C5. Vertebral body heights are maintained. Multilevel degenerative endplate osteophytes. Degenerative endplate marrow edema  at C5-C6. Fibrofatty endplate changes from B5-M3. Marked atlantodental  arthropathy    The craniocervical junction is intact. The course and signal intensity of the  spinal cord are normal.    Paraspinal muscle atrophy. Patchy biapical pleural-parenchymal scarring    C2-C3: No stenosis    C3-C4: Anterolisthesis. Disc pseudobulge. Left uncovertebral hypertrophy. Bilateral facet arthropathy. Ligament of flavum thickening. No significant  spinal stenosis. Severe left and mild right foraminal stenosis    C4-C5: Anterolisthesis. Disc pseudobulge with superimposed central disc  extrusion indenting the ventral spinal cord. Facet arthropathy. Ligamentum  flavum thickening. Mild-to-moderate spinal stenosis. Moderate bilateral  foraminal stenosis    C5-C6: Disc bulge. Uncovertebral hypertrophy. Facet arthropathy. Ligament flavum  thickening. Moderate spinal stenosis. Severe bilateral foraminal stenosis    C6-C7: Disc bulge. Uncovertebral hypertrophy. Ligament flavum thickening. Mild  spinal stenosis. Moderate left and mild right foraminal stenosis    C7-T1: No stenosis    Impression  1. Multilevel degenerative changes, disc disease, and listhesis on a background  of kyphosis. Multilevel mild to moderate spinal stenosis and varying degrees of  foraminal stenosis, worst at C5-C6. 2.  Central disc extrusion indenting the ventral spinal cord at C4-C5. Assessment/Plan:       ICD-10-CM ICD-9-CM    1. Hydronephrosis with ureteropelvic junction (UPJ) obstruction  Q62.11 591       2. Routine adult health maintenance  Z00.00 V70.0       3. Prediabetes  R73.03 790.29       4. Hypercholesterolemia  E78.00 272.0       5. Essential hypertension  I10 401.9       6. ASCVD (arteriosclerotic cardiovascular disease)  I25.10 429.2      440.9       7. Encounter for immunization  Z23 V03.89 INFLUENZA, FLUAD, (AGE 72 Y+), IM, PF, 0.5 ML                 Healthcare Maintenance:  - Preventive measures are reviewed as per above  - Up to date on routine interventions except as noted above  - Orders placed to update gaps as noted  - Notes: UTD, flu shot today    Diabetes Mellitus:   - Type: prediabetes   - Current A1C:   Lab Results   Component Value Date/Time    Hemoglobin A1c 5.9 (H) 10/10/2022 07:52 AM       - Target H2S: <8%   - Complications: none   - Relevant Diabetes Medications:  Key Antihyperglycemic Medications       Patient is on no antihyperglycemic meds. No medications indicated, CCM     Essential Hypertension/Blood Pressure Management:   - Home BP Readings: not doing   - Current Control: optimal   - Target BP: <130/80 mmHg   - Relevant BP Meds:  Key CAD CHF Meds               aspirin delayed-release 81 mg tablet (Taking) Take 1 Tablet by mouth two (2) times a day for 30 days. metoprolol succinate (TOPROL-XL) 50 mg XL tablet (Taking) Take 50 mg by mouth daily. benazepriL (LOTENSIN) 20 mg tablet (Taking) Take 1 Tablet by mouth daily for 360 days. hydroCHLOROthiazide (HYDRODIURIL) 25 mg tablet (Taking) Take 1 Tablet by mouth daily for 360 days.     atorvastatin (LIPITOR) 10 mg tablet (Taking) Take 1 Tablet by mouth nightly for 360 days.               - Plan: continue current treatment regimen, continue current meds, dietary sodium restriction, regular aerobic exercise   - Notes: [facility]    Hyperlipidemia/Dyslipidemia:   - Summary of Cardiovascular Risks and Goals:     LDL goal is under 100  hypertension  hyperlipidemia   -   Lab Results   Component Value Date/Time    LDL, calculated 79.2 08/30/2022 09:38 AM    HDL Cholesterol 58 08/30/2022 09:38 AM       - Relevant Cholesterol Meds:  Key Antihyperlipidemia Meds               atorvastatin (LIPITOR) 10 mg tablet (Taking) Take 1 Tablet by mouth nightly for 360 days. - Cholesterol at target: yes   - Does patient meet USPSTF and ACC/AHA indications for pharmacotherapy (e.g., statin): yes   - GI symptoms with meds: NO   - Muscle aches with meds: NO   - Other Adverse effects with meds: NO   - Medication Plan: continue   - Notes: [facility]      Hydronephrosis:   - CT Urogram pending. Asymptomatic. Labs from recent surgery unremarkable. I have reviewed the patient's medical history in detail and updated the computerized patient record. We had a prolonged discussion about these complex clinical issues and went over the various important aspects to consider. All questions were answered. Advised the patient to call back or return to office if symptoms do not improve, change in nature, or persist.     The patient was given an after visit summary or informed of [employer] which includes patient instructions, diagnoses, current medications, & vitals. she expressed understanding with the diagnosis and plan. Gertrude Mejia MD    Please note that this dictation was completed with [employer], the computer voice recognition software. Quite often unanticipated grammatical, syntax, homophones, and other interpretive errors are inadvertently transcribed by the computer software. Please disregard these errors.   Please excuse any errors that have escaped final proofreading.

## 2022-11-01 ENCOUNTER — OFFICE VISIT (OUTPATIENT)
Dept: INTERNAL MEDICINE CLINIC | Age: 77
End: 2022-11-01
Payer: MEDICARE

## 2022-11-01 VITALS
WEIGHT: 153.7 LBS | HEART RATE: 70 BPM | BODY MASS INDEX: 25.61 KG/M2 | SYSTOLIC BLOOD PRESSURE: 120 MMHG | DIASTOLIC BLOOD PRESSURE: 80 MMHG | OXYGEN SATURATION: 98 % | RESPIRATION RATE: 18 BRPM | HEIGHT: 65 IN

## 2022-11-01 DIAGNOSIS — E78.00 HYPERCHOLESTEROLEMIA: ICD-10-CM

## 2022-11-01 DIAGNOSIS — I25.10 ASCVD (ARTERIOSCLEROTIC CARDIOVASCULAR DISEASE): ICD-10-CM

## 2022-11-01 DIAGNOSIS — Z00.00 ROUTINE ADULT HEALTH MAINTENANCE: ICD-10-CM

## 2022-11-01 DIAGNOSIS — R73.03 PREDIABETES: ICD-10-CM

## 2022-11-01 DIAGNOSIS — Q62.11 HYDRONEPHROSIS WITH URETEROPELVIC JUNCTION (UPJ) OBSTRUCTION: Primary | ICD-10-CM

## 2022-11-01 DIAGNOSIS — Z23 ENCOUNTER FOR IMMUNIZATION: ICD-10-CM

## 2022-11-01 DIAGNOSIS — I10 ESSENTIAL HYPERTENSION: ICD-10-CM

## 2022-11-01 PROCEDURE — G0008 ADMIN INFLUENZA VIRUS VAC: HCPCS | Performed by: INTERNAL MEDICINE

## 2022-11-01 PROCEDURE — 99214 OFFICE O/P EST MOD 30 MIN: CPT | Performed by: INTERNAL MEDICINE

## 2022-11-01 PROCEDURE — 1123F ACP DISCUSS/DSCN MKR DOCD: CPT | Performed by: INTERNAL MEDICINE

## 2022-11-01 PROCEDURE — 3078F DIAST BP <80 MM HG: CPT | Performed by: INTERNAL MEDICINE

## 2022-11-01 PROCEDURE — 3074F SYST BP LT 130 MM HG: CPT | Performed by: INTERNAL MEDICINE

## 2022-11-01 PROCEDURE — 90694 VACC AIIV4 NO PRSRV 0.5ML IM: CPT | Performed by: INTERNAL MEDICINE

## 2022-11-01 NOTE — PROGRESS NOTES
After obtaining written consent and per orders of Dr. John Madrid, injection of Fluad given by Rhina Lawrence CMA. Order and injection/medication verified by Andreea Johnson. Patient tolerated procedure well. VIS was given to them. No reactions noted.

## 2022-11-01 NOTE — PROGRESS NOTES
Chief Complaint   Patient presents with    Follow-up         1. \"Have you been to the ER, urgent care clinic since your last visit? Hospitalized since your last visit? \" No    2. \"Have you seen or consulted any other health care providers outside of the [address] [place] [address] since your last visit? \" No     3. For patients aged 39-70: Has the patient had a colonoscopy / FIT/ Cologuard? No      If the patient is female:    4. For patients aged 41-77: Has the patient had a mammogram within the past 2 years? No      5. For patients aged 21-65: Has the patient had a pap smear?  No

## 2022-12-13 ENCOUNTER — HOSPITAL ENCOUNTER (OUTPATIENT)
Dept: CT IMAGING | Age: 77
Discharge: HOME | End: 2022-12-13
Attending: INTERNAL MEDICINE
Payer: MEDICARE

## 2022-12-13 DIAGNOSIS — Q62.11 HYDRONEPHROSIS WITH URETEROPELVIC JUNCTION (UPJ) OBSTRUCTION: ICD-10-CM

## 2022-12-13 PROCEDURE — 74011000636 HC RX REV CODE- 636: Performed by: INTERNAL MEDICINE

## 2022-12-13 PROCEDURE — 74178 CT ABD&PLV WO CNTR FLWD CNTR: CPT

## 2022-12-13 RX ADMIN — IOPAMIDOL 100 ML: 755 INJECTION, SOLUTION INTRAVENOUS at 12:03

## 2022-12-20 DIAGNOSIS — K44.9 HIATAL HERNIA: ICD-10-CM

## 2022-12-20 DIAGNOSIS — Q62.39 CONGENITAL OBSTRUCTION OF URETEROPELVIC JUNCTION (UPJ): Primary | ICD-10-CM

## 2023-05-22 ENCOUNTER — PATIENT MESSAGE (OUTPATIENT)
Facility: CLINIC | Age: 78
End: 2023-05-22

## 2023-05-22 DIAGNOSIS — H69.83 DYSFUNCTION OF BOTH EUSTACHIAN TUBES: Primary | ICD-10-CM

## 2023-05-22 DIAGNOSIS — H69.90 DYSFUNCTION OF EUSTACHIAN TUBE, UNSPECIFIED LATERALITY: Primary | ICD-10-CM

## 2023-05-22 RX ORDER — IPRATROPIUM BROMIDE 21 UG/1
2 SPRAY, METERED NASAL EVERY 12 HOURS
Qty: 30 ML | Refills: 3 | Status: CANCELLED | OUTPATIENT
Start: 2023-05-22

## 2023-05-22 RX ORDER — SOD CHLOR,SOD BICARB/NETI POT
1 PACKET, WITH RINSE DEVICE NASAL 2 TIMES DAILY
Qty: 1 EACH | Refills: 0 | Status: CANCELLED | OUTPATIENT
Start: 2023-05-22

## 2023-05-23 RX ORDER — IPRATROPIUM BROMIDE 21 UG/1
2 SPRAY, METERED NASAL EVERY 12 HOURS
Qty: 30 ML | Refills: 3 | Status: SHIPPED | OUTPATIENT
Start: 2023-05-23

## 2023-05-23 RX ORDER — SOD CHLOR,SOD BICARB/NETI POT
1 PACKET, WITH RINSE DEVICE NASAL 2 TIMES DAILY
Qty: 1 EACH | Refills: 1 | Status: SHIPPED | OUTPATIENT
Start: 2023-05-23

## 2023-05-23 NOTE — TELEPHONE ENCOUNTER
From: Letitia Coyne  To: Dr. Prudence Oreilly: 5/22/2023 2:44 PM EDT  Subject: possible ear infection    I have been struggling with stopped up ears for several weeks. This started during allergy season, have tried diann-nase but hasn't helped. I need to see someone, Dr. Hilton Anthony if he is still there or the nurse practitioner. I have an eye appointment on Wednesday so wouldn't be able to come that day.  Would appreciate your help, Diane Cade, [phone]

## 2023-06-06 ENCOUNTER — TRANSCRIBE ORDERS (OUTPATIENT)
Facility: HOSPITAL | Age: 78
End: 2023-06-06

## 2023-06-06 DIAGNOSIS — Z12.31 ENCOUNTER FOR SCREENING MAMMOGRAM FOR MALIGNANT NEOPLASM OF BREAST: Primary | ICD-10-CM

## 2023-06-07 ENCOUNTER — HOSPITAL ENCOUNTER (OUTPATIENT)
Facility: HOSPITAL | Age: 78
Setting detail: OUTPATIENT SURGERY
Discharge: HOME | End: 2023-06-07
Attending: INTERNAL MEDICINE | Admitting: INTERNAL MEDICINE
Payer: MEDICARE

## 2023-06-07 ENCOUNTER — ANESTHESIA (OUTPATIENT)
Facility: HOSPITAL | Age: 78
End: 2023-06-07
Payer: MEDICARE

## 2023-06-07 ENCOUNTER — ANESTHESIA EVENT (OUTPATIENT)
Facility: HOSPITAL | Age: 78
End: 2023-06-07
Payer: MEDICARE

## 2023-06-07 VITALS
HEIGHT: 64 IN | HEART RATE: 70 BPM | DIASTOLIC BLOOD PRESSURE: 58 MMHG | BODY MASS INDEX: 25.25 KG/M2 | WEIGHT: 147.9 LBS | TEMPERATURE: 98 F | RESPIRATION RATE: 15 BRPM | OXYGEN SATURATION: 97 % | SYSTOLIC BLOOD PRESSURE: 153 MMHG

## 2023-06-07 PROCEDURE — 3700000000 HC ANESTHESIA ATTENDED CARE: Performed by: INTERNAL MEDICINE

## 2023-06-07 PROCEDURE — 2500000003 HC RX 250 WO HCPCS: Performed by: NURSE ANESTHETIST, CERTIFIED REGISTERED

## 2023-06-07 PROCEDURE — 2580000003 HC RX 258: Performed by: INTERNAL MEDICINE

## 2023-06-07 PROCEDURE — 3700000001 HC ADD 15 MINUTES (ANESTHESIA): Performed by: INTERNAL MEDICINE

## 2023-06-07 PROCEDURE — 88305 TISSUE EXAM BY PATHOLOGIST: CPT

## 2023-06-07 PROCEDURE — 6360000002 HC RX W HCPCS: Performed by: NURSE ANESTHETIST, CERTIFIED REGISTERED

## 2023-06-07 PROCEDURE — 3600007512: Performed by: INTERNAL MEDICINE

## 2023-06-07 PROCEDURE — 2709999900 HC NON-CHARGEABLE SUPPLY: Performed by: INTERNAL MEDICINE

## 2023-06-07 PROCEDURE — 3600007502: Performed by: INTERNAL MEDICINE

## 2023-06-07 PROCEDURE — 7100000010 HC PHASE II RECOVERY - FIRST 15 MIN: Performed by: INTERNAL MEDICINE

## 2023-06-07 RX ORDER — SODIUM CHLORIDE 0.9 % (FLUSH) 0.9 %
5-40 SYRINGE (ML) INJECTION PRN
Status: DISCONTINUED | OUTPATIENT
Start: 2023-06-07 | End: 2023-06-07 | Stop reason: HOSPADM

## 2023-06-07 RX ORDER — SODIUM CHLORIDE 0.9 % (FLUSH) 0.9 %
5-40 SYRINGE (ML) INJECTION EVERY 12 HOURS SCHEDULED
Status: DISCONTINUED | OUTPATIENT
Start: 2023-06-07 | End: 2023-06-07 | Stop reason: HOSPADM

## 2023-06-07 RX ORDER — LIDOCAINE HYDROCHLORIDE 20 MG/ML
INJECTION, SOLUTION EPIDURAL; INFILTRATION; INTRACAUDAL; PERINEURAL PRN
Status: DISCONTINUED | OUTPATIENT
Start: 2023-06-07 | End: 2023-06-07 | Stop reason: SDUPTHER

## 2023-06-07 RX ORDER — PHENYLEPHRINE HCL IN 0.9% NACL 0.4MG/10ML
SYRINGE (ML) INTRAVENOUS PRN
Status: DISCONTINUED | OUTPATIENT
Start: 2023-06-07 | End: 2023-06-07 | Stop reason: SDUPTHER

## 2023-06-07 RX ORDER — SODIUM CHLORIDE 9 MG/ML
25 INJECTION, SOLUTION INTRAVENOUS PRN
Status: DISCONTINUED | OUTPATIENT
Start: 2023-06-07 | End: 2023-06-07 | Stop reason: HOSPADM

## 2023-06-07 RX ADMIN — Medication 80 MCG: at 10:34

## 2023-06-07 RX ADMIN — PROPOFOL 50 MG: 10 INJECTION, EMULSION INTRAVENOUS at 10:42

## 2023-06-07 RX ADMIN — PROPOFOL 30 MG: 10 INJECTION, EMULSION INTRAVENOUS at 10:30

## 2023-06-07 RX ADMIN — PROPOFOL 50 MG: 10 INJECTION, EMULSION INTRAVENOUS at 10:47

## 2023-06-07 RX ADMIN — PROPOFOL 50 MG: 10 INJECTION, EMULSION INTRAVENOUS at 10:29

## 2023-06-07 RX ADMIN — LIDOCAINE HYDROCHLORIDE 80 MG: 20 INJECTION, SOLUTION EPIDURAL; INFILTRATION; INTRACAUDAL; PERINEURAL at 10:28

## 2023-06-07 RX ADMIN — SODIUM CHLORIDE: 9 INJECTION, SOLUTION INTRAVENOUS at 10:26

## 2023-06-07 RX ADMIN — PROPOFOL 50 MG: 10 INJECTION, EMULSION INTRAVENOUS at 10:37

## 2023-06-07 RX ADMIN — PROPOFOL 50 MG: 10 INJECTION, EMULSION INTRAVENOUS at 10:28

## 2023-06-07 ASSESSMENT — PAIN - FUNCTIONAL ASSESSMENT: PAIN_FUNCTIONAL_ASSESSMENT: NONE - DENIES PAIN

## 2023-06-07 NOTE — H&P
Darvin Figueroa MD  [facility], [address], [place] [address]  [address]  [phone]  [email or web address]      Gastroenterology Outpatient History and Physical    Patient: Ayesha Kelly    Physician: Suzanna Díaz MD    Vital Signs: Blood pressure (!) 156/62, pulse 79, temperature 98 °F (36.7 °C), temperature source Tympanic, resp. rate 21, height 1.626 m (5' 4\"), weight 67.1 kg (147 lb 14.4 oz), SpO2 98 %. Allergies: Allergies   Allergen Reactions    Nitrous Oxide Dizziness or Vertigo    Sulfa Antibiotics Nausea Only       Chief Complaint: hiatal hernia    History of Present Illness: The patient is a 65year old female who presents with a complaint of Hiatal Hernia. Note for \"Hiatal Hernia\": Ms Saturnino Winter is a 79 yo female who presents for hiatal hernia. Hx of hiatal hernia since 2012. Recently had CT scan for hip replacement. CT scan noted large hiatal hernia that contains the entire stomach. States she has chronic GERD, she has takes omeprazole daily for years, if stops it, the heart burn comes back. Tried zantac a few years ago but didn't work and caused stomach pains. Epigastric sharp pain that bent her over, then it resolved. No dysphagia or odynophagia. Weight is stable, maybe a few times with strict diet change. IF she gets really full, she will be uncomfortable. No previous EGD. CT abd/pel W 12/13/22 : IMPRESSION  Significant left hydronephrosis with abrupt transition at the ureteropelvic  junction concerning for congenital UPJ obstruction. Right extrarenal pelvis. No  renal mass or calcification. Cholelithiasis. Large hiatal hernia contains the  entire stomach. Grandmother had BE. Cologuaaddie in 2019 that was negative. Colonoscopy last in 2009. Reports regular BMs. No abdominal pain. No melena or hematochezia. No family hx of CRC.       History:  Past Medical History:   Diagnosis Date    Age-related osteoporosis

## 2023-06-07 NOTE — ANESTHESIA PRE PROCEDURE
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 Corinna Camacho History of palpitations     Hydronephrosis of left kidney 10/2022    CT Finding (incidental)    Hypercholesterolemia     Hypertension 08/21/2017    Dr. Gina Dumont Internal hemorrhoid 08/21/2017    Lumbar spinal stenosis 09/20/2017    Prediabetes 10/2022    S/P radiation therapy 2009    TMJ dysfunction 04/05/2018    Vitamin D deficiency 08/21/2017       Past Surgical History:        Procedure Laterality Date    BREAST BIOPSY Right 2008    positive ultrasound core bx    BREAST BIOPSY Bilateral 2008    benign mri bx    BREAST LUMPECTOMY Right 2009    x2    BUNIONECTOMY Bilateral     CATARACT REMOVAL Bilateral 2020    COLONOSCOPY      DILATION AND CURETTAGE OF UTERUS      TONSILLECTOMY      TUBAL LIGATION  1980       Social History:    Social History     Tobacco Use    Smoking status: Never    Smokeless tobacco: Never   Substance Use Topics    Alcohol use:  No                                Counseling given: Not Answered      Vital Signs

## 2023-06-07 NOTE — OP NOTE
Finn Reba                   Endoscopic Gastroduodenoscopy Procedure Note      6/7/2023  Francisca Abler  1945  [id number]    Procedure: Endoscopic Gastroduodenoscopy with biopsy    Indication:  GERD, Large hiatal hernia      Pre-operative Diagnosis: see indication above    Post-operative Diagnosis: see findings below    : ROMULO Monzon MD    Referring Provider:  Cecil Delong MD      Anesthesia/Sedation:  MAC anesthesia Propofol    Airway assessment: No airway problems anticipated    Pre-Procedural Exam:      Airway: clear, no airway problems anticipated  Heart: RRR, without gallops or rubs  Lungs: clear bilaterally without wheezes, crackles, or rhonchi  Abdomen: soft, nontender, nondistended, bowel sounds present  Mental Status: awake, alert and oriented to person, place and time       Procedure Details     After infomed consent was obtained for the procedure, with all risks and benefits of procedure explained the patient was taken to the endoscopy suite and placed in the left lateral decubitus position. Following sequential administration of sedation as per above, the endoscope was inserted into the mouth and advanced under direct vision to second portion of the duodenum. A careful inspection was made as the gastroscope was withdrawn, including a retroflexed view of the proximal stomach; findings and interventions are described below. Findings:   Esophagus: Large hiatal hernia with z-line at 32 cm. Mild erythema at GE junction biopsied  Stomach: Hiatal hernia mostly within chest. Otherwise normal  Duodenum: normal    Therapies:  biopsy of esophagus    Specimens: Biopsies of the GE junction           Complications:   None; patient tolerated the procedure well. EBL:  None. Impression:      -Large hiatal hernia  -mild esophagitis  Otherwise normal stomach and normal duodenum    Recommendations:  -Continue acid suppression. , -Follow
internal, Moderate in size    Recommendations: --No repeat screening colonoscopy indicated due to age. High fiber diet. Resume normal medication(s). Discharge Disposition:  Home in the company of a  when able to ambulate. Sarah Bazzi MD    6/7/2023     ROMULO Banegas MD  [facility], [address] [place] [address] Keesha [address] [place] [address]  [phone]  [email or web address]

## 2023-06-07 NOTE — DISCHARGE INSTRUCTIONS
intravenous analgesia / sedation  you may experience:  Drowsiness, dizziness, sleepiness, or confusion  Difficulty remembering or delayed reaction times  Difficulty with your balance, especially while walking, move slowly and carefully, do not make sudden position changes  Difficulty focusing or blurred vision    You may not be aware of slight changes in your behavior and/or your reaction time because of the medication used during and after your procedure.     Report the following to your physician:  Excessive pain, swelling, redness or odor of or around the surgical area  Temperature over 100.5  Nausea and vomiting lasting longer than 4 hours or if unable to take medications  Any signs of decreased circulation or nerve impairment to extremity: change in color, persistent numbness, tingling, coldness or increase pain  Any questions or concerns    IF YOU REPORT TO AN EMERGENCY ROOM, DOCTOR'S OFFICE OR HOSPITAL WITHIN 24 HOURS AFTER YOUR PROCEDURE, BRING THIS SHEET AND YOUR AFTER VISIT SUMMARY WITH YOU AND GIVE IT TO THE PHYSICIAN OR NURSE ATTENDING YOU.

## 2023-06-07 NOTE — ANESTHESIA POSTPROCEDURE EVALUATION
Department of Anesthesiology  Postprocedure Note    Patient: Gustavo James  MRN: [id number]  YOB: 1945  Date of evaluation: 6/7/2023      Procedure Summary     Date: 06/07/23 Room / Location: [facility] ENDO 03 / [facility]    Anesthesia Start: 1026 Anesthesia Stop: 1053    Procedures:       COLONOSCOPY WITH BIOPSY      EGD ESOPHAGOGASTRODUODENOSCOPY Diagnosis:       Encounter for screening colonoscopy      Large hiatal hernia      (Encounter for screening colonoscopy [Z12.11])      (Large hiatal hernia [K44.9])    Surgeons: Yane Glover MD Responsible Provider: Suze Fu MD    Anesthesia Type: MAC ASA Status: 3          Anesthesia Type: MAC    Nolberto Phase I: Nolberto Score: 10    Nolberto Phase II: Nolberto Score: 10      Anesthesia Post Evaluation    Patient location during evaluation: PACU  Patient participation: complete - patient participated  Level of consciousness: sleepy but conscious and responsive to verbal stimuli  Airway patency: patent  Nausea & Vomiting: no vomiting and no nausea  Complications: no  Cardiovascular status: blood pressure returned to baseline and hemodynamically stable  Respiratory status: acceptable  Hydration status: stable

## 2023-06-07 NOTE — PROGRESS NOTES
ARRIVAL INFORMATION:  Verified patient name and date of birth, scheduled procedure, and informed consent. : Torres Babcock (spouse) contact number: [phone]  Physician and staff can share information with the . Belongings with patient include:  Clothing,None        GI FOCUSED ASSESSMENT:  Neuro: Awake, alert, oriented x4  Respiratory: even and unlabored   GI: soft and non-distended  EKG Rhythm: normal sinus rhythm and PVC's    Education:Reviewed general discharge instructions and  information.

## 2023-06-27 ENCOUNTER — HOSPITAL ENCOUNTER (OUTPATIENT)
Facility: HOSPITAL | Age: 78
Discharge: HOME | End: 2023-06-30
Attending: INTERNAL MEDICINE
Payer: MEDICARE

## 2023-06-27 DIAGNOSIS — Z12.31 ENCOUNTER FOR SCREENING MAMMOGRAM FOR MALIGNANT NEOPLASM OF BREAST: ICD-10-CM

## 2023-06-27 PROCEDURE — 77067 SCR MAMMO BI INCL CAD: CPT

## 2023-07-20 ENCOUNTER — NURSE ONLY (OUTPATIENT)
Facility: CLINIC | Age: 78
End: 2023-07-20

## 2023-07-20 DIAGNOSIS — I10 PRIMARY HYPERTENSION: Primary | ICD-10-CM

## 2023-07-20 DIAGNOSIS — R73.03 PREDIABETES: ICD-10-CM

## 2023-07-20 DIAGNOSIS — I10 PRIMARY HYPERTENSION: ICD-10-CM

## 2023-07-21 LAB
ALBUMIN SERPL-MCNC: 4.1 G/DL (ref 3.5–5)
ALBUMIN/GLOB SERPL: 1.3 (ref 1.1–2.2)
ALP SERPL-CCNC: 64 U/L (ref 45–117)
ALT SERPL-CCNC: 51 U/L (ref 12–78)
ANION GAP SERPL CALC-SCNC: 6 MMOL/L (ref 5–15)
APPEARANCE UR: CLEAR
AST SERPL-CCNC: 31 U/L (ref 15–37)
BACTERIA URNS QL MICRO: NEGATIVE /HPF
BASOPHILS # BLD: 0 K/UL (ref 0–0.1)
BASOPHILS NFR BLD: 1 % (ref 0–1)
BILIRUB SERPL-MCNC: 0.9 MG/DL (ref 0.2–1)
BILIRUB UR QL: NEGATIVE
BUN SERPL-MCNC: 17 MG/DL (ref 6–20)
BUN/CREAT SERPL: 26 (ref 12–20)
CALCIUM SERPL-MCNC: 9.9 MG/DL (ref 8.5–10.1)
CHLORIDE SERPL-SCNC: 103 MMOL/L (ref 97–108)
CHOLEST SERPL-MCNC: 134 MG/DL
CO2 SERPL-SCNC: 27 MMOL/L (ref 21–32)
COLOR UR: NORMAL
CREAT SERPL-MCNC: 0.66 MG/DL (ref 0.55–1.02)
DIFFERENTIAL METHOD BLD: NORMAL
EOSINOPHIL # BLD: 0.2 K/UL (ref 0–0.4)
EOSINOPHIL NFR BLD: 3 % (ref 0–7)
EPITH CASTS URNS QL MICRO: NORMAL /LPF
ERYTHROCYTE [DISTWIDTH] IN BLOOD BY AUTOMATED COUNT: 12.8 % (ref 11.5–14.5)
EST. AVERAGE GLUCOSE BLD GHB EST-MCNC: 114 MG/DL
GLOBULIN SER CALC-MCNC: 3.2 G/DL (ref 2–4)
GLUCOSE SERPL-MCNC: 88 MG/DL (ref 65–100)
GLUCOSE UR STRIP.AUTO-MCNC: NEGATIVE MG/DL
HBA1C MFR BLD: 5.6 % (ref 4–5.6)
HCT VFR BLD AUTO: 42.7 % (ref 35–47)
HDLC SERPL-MCNC: 65 MG/DL
HDLC SERPL: 2.1 (ref 0–5)
HGB BLD-MCNC: 13.5 G/DL (ref 11.5–16)
HGB UR QL STRIP: NEGATIVE
HYALINE CASTS URNS QL MICRO: NORMAL /LPF (ref 0–5)
IMM GRANULOCYTES # BLD AUTO: 0 K/UL (ref 0–0.04)
IMM GRANULOCYTES NFR BLD AUTO: 0 % (ref 0–0.5)
KETONES UR QL STRIP.AUTO: NEGATIVE MG/DL
LDLC SERPL CALC-MCNC: 56.6 MG/DL (ref 0–100)
LEUKOCYTE ESTERASE UR QL STRIP.AUTO: NEGATIVE
LYMPHOCYTES # BLD: 1.5 K/UL (ref 0.8–3.5)
LYMPHOCYTES NFR BLD: 24 % (ref 12–49)
MCH RBC QN AUTO: 30.8 PG (ref 26–34)
MCHC RBC AUTO-ENTMCNC: 31.6 G/DL (ref 30–36.5)
MCV RBC AUTO: 97.3 FL (ref 80–99)
MONOCYTES # BLD: 0.6 K/UL (ref 0–1)
MONOCYTES NFR BLD: 10 % (ref 5–13)
NEUTS SEG # BLD: 3.9 K/UL (ref 1.8–8)
NEUTS SEG NFR BLD: 62 % (ref 32–75)
NITRITE UR QL STRIP.AUTO: NEGATIVE
NRBC # BLD: 0 K/UL (ref 0–0.01)
NRBC BLD-RTO: 0 PER 100 WBC
PH UR STRIP: 5.5 (ref 5–8)
PLATELET # BLD AUTO: 276 K/UL (ref 150–400)
PMV BLD AUTO: 10.9 FL (ref 8.9–12.9)
POTASSIUM SERPL-SCNC: 4 MMOL/L (ref 3.5–5.1)
PROT SERPL-MCNC: 7.3 G/DL (ref 6.4–8.2)
PROT UR STRIP-MCNC: NEGATIVE MG/DL
RBC # BLD AUTO: 4.39 M/UL (ref 3.8–5.2)
RBC #/AREA URNS HPF: NORMAL /HPF (ref 0–5)
SODIUM SERPL-SCNC: 136 MMOL/L (ref 136–145)
SP GR UR REFRACTOMETRY: 1.01 (ref 1–1.03)
TRIGL SERPL-MCNC: 62 MG/DL
TSH SERPL DL<=0.05 MIU/L-ACNC: 1.48 UIU/ML (ref 0.36–3.74)
URINE CULTURE IF INDICATED: NORMAL
UROBILINOGEN UR QL STRIP.AUTO: 0.2 EU/DL (ref 0.2–1)
VLDLC SERPL CALC-MCNC: 12.4 MG/DL
WBC # BLD AUTO: 6.3 K/UL (ref 3.6–11)
WBC URNS QL MICRO: NORMAL /HPF (ref 0–4)

## 2023-07-23 SDOH — ECONOMIC STABILITY: TRANSPORTATION INSECURITY
IN THE PAST 12 MONTHS, HAS LACK OF TRANSPORTATION KEPT YOU FROM MEETINGS, WORK, OR FROM GETTING THINGS NEEDED FOR DAILY LIVING?: NO

## 2023-07-23 SDOH — ECONOMIC STABILITY: HOUSING INSECURITY
IN THE LAST 12 MONTHS, WAS THERE A TIME WHEN YOU DID NOT HAVE A STEADY PLACE TO SLEEP OR SLEPT IN A SHELTER (INCLUDING NOW)?: NO

## 2023-07-23 SDOH — ECONOMIC STABILITY: FOOD INSECURITY: WITHIN THE PAST 12 MONTHS, YOU WORRIED THAT YOUR FOOD WOULD RUN OUT BEFORE YOU GOT MONEY TO BUY MORE.: NEVER TRUE

## 2023-07-23 SDOH — ECONOMIC STABILITY: FOOD INSECURITY: WITHIN THE PAST 12 MONTHS, THE FOOD YOU BOUGHT JUST DIDN'T LAST AND YOU DIDN'T HAVE MONEY TO GET MORE.: NEVER TRUE

## 2023-07-23 SDOH — ECONOMIC STABILITY: INCOME INSECURITY: HOW HARD IS IT FOR YOU TO PAY FOR THE VERY BASICS LIKE FOOD, HOUSING, MEDICAL CARE, AND HEATING?: NOT HARD AT ALL

## 2023-07-26 ENCOUNTER — OFFICE VISIT (OUTPATIENT)
Facility: CLINIC | Age: 78
End: 2023-07-26
Payer: MEDICARE

## 2023-07-26 VITALS
TEMPERATURE: 98.1 F | HEART RATE: 66 BPM | HEIGHT: 64 IN | DIASTOLIC BLOOD PRESSURE: 80 MMHG | SYSTOLIC BLOOD PRESSURE: 110 MMHG | BODY MASS INDEX: 25.49 KG/M2 | OXYGEN SATURATION: 95 % | WEIGHT: 149.3 LBS | RESPIRATION RATE: 17 BRPM

## 2023-07-26 DIAGNOSIS — K80.20 CHOLELITHIASIS WITHOUT CHOLECYSTITIS: ICD-10-CM

## 2023-07-26 DIAGNOSIS — Z85.3 HISTORY OF RIGHT BREAST CANCER: ICD-10-CM

## 2023-07-26 DIAGNOSIS — M81.0 AGE-RELATED OSTEOPOROSIS WITHOUT CURRENT PATHOLOGICAL FRACTURE: Primary | ICD-10-CM

## 2023-07-26 DIAGNOSIS — I49.9 IRREGULAR HEART BEAT: ICD-10-CM

## 2023-07-26 DIAGNOSIS — K44.9 HIATAL HERNIA: ICD-10-CM

## 2023-07-26 DIAGNOSIS — I10 PRIMARY HYPERTENSION: ICD-10-CM

## 2023-07-26 DIAGNOSIS — E78.2 MIXED HYPERLIPIDEMIA: ICD-10-CM

## 2023-07-26 DIAGNOSIS — K21.9 GASTROESOPHAGEAL REFLUX DISEASE WITHOUT ESOPHAGITIS: ICD-10-CM

## 2023-07-26 DIAGNOSIS — N13.30 HYDRONEPHROSIS OF LEFT KIDNEY: ICD-10-CM

## 2023-07-26 PROCEDURE — 1123F ACP DISCUSS/DSCN MKR DOCD: CPT | Performed by: NURSE PRACTITIONER

## 2023-07-26 PROCEDURE — 3079F DIAST BP 80-89 MM HG: CPT | Performed by: NURSE PRACTITIONER

## 2023-07-26 PROCEDURE — 3074F SYST BP LT 130 MM HG: CPT | Performed by: NURSE PRACTITIONER

## 2023-07-26 PROCEDURE — 99214 OFFICE O/P EST MOD 30 MIN: CPT | Performed by: NURSE PRACTITIONER

## 2023-07-26 ASSESSMENT — ENCOUNTER SYMPTOMS
PHOTOPHOBIA: 0
VOMITING: 0
ANAL BLEEDING: 0
APNEA: 0
CHOKING: 0
CONSTIPATION: 0
BACK PAIN: 0
DIARRHEA: 0
TROUBLE SWALLOWING: 0
RECTAL PAIN: 0
SINUS PRESSURE: 0
EYE DISCHARGE: 0
BLOOD IN STOOL: 0
EYE REDNESS: 0
EYE PAIN: 0
SORE THROAT: 0
COUGH: 0
NAUSEA: 0
COLOR CHANGE: 0
FACIAL SWELLING: 0
SHORTNESS OF BREATH: 0
ABDOMINAL PAIN: 0
WHEEZING: 0
SINUS PAIN: 0

## 2023-07-26 ASSESSMENT — PATIENT HEALTH QUESTIONNAIRE - PHQ9
SUM OF ALL RESPONSES TO PHQ9 QUESTIONS 1 & 2: 0
2. FEELING DOWN, DEPRESSED OR HOPELESS: 0
1. LITTLE INTEREST OR PLEASURE IN DOING THINGS: 0
SUM OF ALL RESPONSES TO PHQ QUESTIONS 1-9: 0

## 2023-07-26 NOTE — PROGRESS NOTES
Chief Complaint   Patient presents with    New Patient     New to provider establish care     1. Have you been to the ER, urgent care clinic since your last visit? Hospitalized since your last visit? No    2. Have you seen or consulted any other health care providers outside of the [address] [place] [address] Avenue since your last visit? Include any pap smears or colon screening.  No

## 2023-07-26 NOTE — PROGRESS NOTES
Jalyn Anderson (: 1945) is a 65 y.o. female here for evaluation of the following chief complaint(s):  New Patient (New to provider establish care)         SUBJECTIVE/OBJECTIVE:  HPI    Ms. Monahan Age here today to establish with new provider. She has no new concerns today. PMH includes irregular heart beat, hypertension, GERD, osteoporosis, diverticulosis, gallstones, large hiatal hernia, left kidney hydronephrosis. Dr. Paula Sahu- cardiology  Dr. Светлана Laraorro-  oncology  Allergies   Allergen Reactions    Nitrous Oxide Dizziness or Vertigo    Sulfa Antibiotics Nausea Only       Current Outpatient Medications   Medication Sig Dispense Refill    Multiple Vitamin (MULTI-VITAMIN DAILY PO) Take by mouth      ZINC PO Take by mouth      atorvastatin (LIPITOR) 10 MG tablet Take by mouth      benazepril (LOTENSIN) 20 MG tablet Take 1 tablet by mouth daily      denosumab (PROLIA) 60 MG/ML SOSY SC injection Inject into the skin every 6 months      hydroCHLOROthiazide (HYDRODIURIL) 25 MG tablet Take by mouth daily      metoprolol succinate (TOPROL XL) 50 MG extended release tablet Take by mouth daily      omeprazole (PRILOSEC) 20 MG delayed release capsule Take by mouth daily       No current facility-administered medications for this visit.         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

## 2023-09-12 NOTE — TELEPHONE ENCOUNTER
Pharmacy: [id number]    benazepriL (LOTENSIN) 20 mg tablet 90 Tablet 3 4/10/2023 4/4/2024    Sig - Route:  Take 1 Tablet by mouth daily for 360 days. - Oral

## 2023-09-13 NOTE — TELEPHONE ENCOUNTER
Requested Prescriptions     Pending Prescriptions Disp Refills    benazepril (LOTENSIN) 20 MG tablet 90 tablet 3     Sig: Take 1 tablet by mouth daily       RX refill request from the patient/pharmacy. Patient last seen 7/26/23 with labs, and next appt. scheduled for 1/30/24.

## 2023-09-14 RX ORDER — BENAZEPRIL HYDROCHLORIDE 20 MG/1
20 TABLET ORAL DAILY
Qty: 90 TABLET | Refills: 3 | Status: SHIPPED | OUTPATIENT
Start: 2023-09-14 | End: 2024-09-08

## 2024-01-26 ENCOUNTER — NURSE ONLY (OUTPATIENT)
Facility: CLINIC | Age: 79
End: 2024-01-26

## 2024-01-26 DIAGNOSIS — I10 PRIMARY HYPERTENSION: ICD-10-CM

## 2024-01-27 LAB
ALBUMIN SERPL-MCNC: 3.8 G/DL (ref 3.5–5)
ALBUMIN/GLOB SERPL: 1.2 (ref 1.1–2.2)
ALP SERPL-CCNC: 62 U/L (ref 45–117)
ALT SERPL-CCNC: 28 U/L (ref 12–78)
ANION GAP SERPL CALC-SCNC: 7 MMOL/L (ref 5–15)
APPEARANCE UR: CLEAR
AST SERPL-CCNC: 20 U/L (ref 15–37)
BACTERIA URNS QL MICRO: ABNORMAL /HPF
BASOPHILS # BLD: 0.1 K/UL (ref 0–0.1)
BASOPHILS NFR BLD: 1 % (ref 0–1)
BILIRUB SERPL-MCNC: 0.7 MG/DL (ref 0.2–1)
BILIRUB UR QL: NEGATIVE
BUN SERPL-MCNC: 16 MG/DL (ref 6–20)
BUN/CREAT SERPL: 22 (ref 12–20)
CALCIUM SERPL-MCNC: 9.6 MG/DL (ref 8.5–10.1)
CHLORIDE SERPL-SCNC: 103 MMOL/L (ref 97–108)
CHOLEST SERPL-MCNC: 140 MG/DL
CO2 SERPL-SCNC: 27 MMOL/L (ref 21–32)
COLOR UR: ABNORMAL
CREAT SERPL-MCNC: 0.72 MG/DL (ref 0.55–1.02)
DIFFERENTIAL METHOD BLD: ABNORMAL
EOSINOPHIL # BLD: 0.5 K/UL (ref 0–0.4)
EOSINOPHIL NFR BLD: 8 % (ref 0–7)
EPITH CASTS URNS QL MICRO: ABNORMAL /LPF
ERYTHROCYTE [DISTWIDTH] IN BLOOD BY AUTOMATED COUNT: 12.6 % (ref 11.5–14.5)
GLOBULIN SER CALC-MCNC: 3.2 G/DL (ref 2–4)
GLUCOSE SERPL-MCNC: 94 MG/DL (ref 65–100)
GLUCOSE UR STRIP.AUTO-MCNC: NEGATIVE MG/DL
HCT VFR BLD AUTO: 43.9 % (ref 35–47)
HDLC SERPL-MCNC: 56 MG/DL
HDLC SERPL: 2.5 (ref 0–5)
HGB BLD-MCNC: 14.1 G/DL (ref 11.5–16)
HGB UR QL STRIP: NEGATIVE
IMM GRANULOCYTES # BLD AUTO: 0 K/UL (ref 0–0.04)
IMM GRANULOCYTES NFR BLD AUTO: 0 % (ref 0–0.5)
KETONES UR QL STRIP.AUTO: NEGATIVE MG/DL
LDLC SERPL CALC-MCNC: 69.6 MG/DL (ref 0–100)
LEUKOCYTE ESTERASE UR QL STRIP.AUTO: ABNORMAL
LYMPHOCYTES # BLD: 1.5 K/UL (ref 0.8–3.5)
LYMPHOCYTES NFR BLD: 24 % (ref 12–49)
MCH RBC QN AUTO: 31.8 PG (ref 26–34)
MCHC RBC AUTO-ENTMCNC: 32.1 G/DL (ref 30–36.5)
MCV RBC AUTO: 98.9 FL (ref 80–99)
MONOCYTES # BLD: 0.8 K/UL (ref 0–1)
MONOCYTES NFR BLD: 12 % (ref 5–13)
NEUTS SEG # BLD: 3.6 K/UL (ref 1.8–8)
NEUTS SEG NFR BLD: 55 % (ref 32–75)
NITRITE UR QL STRIP.AUTO: NEGATIVE
NRBC # BLD: 0 K/UL (ref 0–0.01)
NRBC BLD-RTO: 0 PER 100 WBC
PH UR STRIP: 6.5 (ref 5–8)
PLATELET # BLD AUTO: 337 K/UL (ref 150–400)
PMV BLD AUTO: 10.6 FL (ref 8.9–12.9)
POTASSIUM SERPL-SCNC: 4.2 MMOL/L (ref 3.5–5.1)
PROT SERPL-MCNC: 7 G/DL (ref 6.4–8.2)
PROT UR STRIP-MCNC: NEGATIVE MG/DL
RBC # BLD AUTO: 4.44 M/UL (ref 3.8–5.2)
RBC #/AREA URNS HPF: ABNORMAL /HPF (ref 0–5)
SODIUM SERPL-SCNC: 137 MMOL/L (ref 136–145)
SP GR UR REFRACTOMETRY: 1.01 (ref 1–1.03)
TRIGL SERPL-MCNC: 72 MG/DL
TSH SERPL DL<=0.05 MIU/L-ACNC: 1.97 UIU/ML (ref 0.36–3.74)
URINE CULTURE IF INDICATED: ABNORMAL
UROBILINOGEN UR QL STRIP.AUTO: 0.2 EU/DL (ref 0.2–1)
VLDLC SERPL CALC-MCNC: 14.4 MG/DL
WBC # BLD AUTO: 6.5 K/UL (ref 3.6–11)
WBC URNS QL MICRO: ABNORMAL /HPF (ref 0–4)

## 2024-02-08 ENCOUNTER — TELEPHONE (OUTPATIENT)
Facility: CLINIC | Age: 79
End: 2024-02-08

## 2024-05-06 RX ORDER — HYDROCHLOROTHIAZIDE 25 MG/1
25 TABLET ORAL DAILY
Qty: 90 TABLET | Refills: 0 | Status: SHIPPED | OUTPATIENT
Start: 2024-05-06 | End: 2025-05-01

## 2024-05-06 NOTE — TELEPHONE ENCOUNTER
Disp Refills Start End    hydroCHLOROthiazide (HYDRODIURIL) 25 mg tablet 90 Tablet 0 4/10/2023 4/4/2024    Sig - Route: Take 1 Tablet by mouth daily for 360 days. - Oral      Last visit 7/6/23 SULLY Reed  Future appt 8/20/24 NP [facility] [address]

## 2024-05-06 NOTE — TELEPHONE ENCOUNTER
PCP: No primary care provider on file.    Last appt: 7/26/2023    Future Appointments   Date Time Provider Department Center   7/2/2024 12:30 PM [facility]   7/2/2024  1:00 PM [facility]   8/20/2024  9:00 AM Cm Child, APRN - NP [facility]       Requested Prescriptions     Pending Prescriptions Disp Refills    hydroCHLOROthiazide (HYDRODIURIL) 25 MG tablet 90 tablet 0     Sig: Take 1 tablet by mouth daily

## 2024-07-02 ENCOUNTER — HOSPITAL ENCOUNTER (OUTPATIENT)
Facility: HOSPITAL | Age: 79
Discharge: HOME | End: 2024-07-05
Attending: INTERNAL MEDICINE
Payer: MEDICARE

## 2024-07-02 VITALS — HEIGHT: 64 IN | WEIGHT: 147 LBS | BODY MASS INDEX: 25.1 KG/M2

## 2024-07-02 DIAGNOSIS — Z12.31 SCREENING MAMMOGRAM FOR HIGH-RISK PATIENT: ICD-10-CM

## 2024-07-02 DIAGNOSIS — Z78.0 ASYMPTOMATIC MENOPAUSAL STATE: ICD-10-CM

## 2024-07-02 DIAGNOSIS — E55.9 VITAMIN D DEFICIENCY, UNSPECIFIED: ICD-10-CM

## 2024-07-02 DIAGNOSIS — Z85.3 PERSONAL HISTORY OF MALIGNANT NEOPLASM OF BREAST: ICD-10-CM

## 2024-07-02 PROCEDURE — 77080 DXA BONE DENSITY AXIAL: CPT

## 2024-07-02 PROCEDURE — 77067 SCR MAMMO BI INCL CAD: CPT

## 2024-08-20 ENCOUNTER — OFFICE VISIT (OUTPATIENT)
Facility: CLINIC | Age: 79
End: 2024-08-20
Payer: MEDICARE

## 2024-08-20 VITALS
OXYGEN SATURATION: 98 % | HEART RATE: 60 BPM | SYSTOLIC BLOOD PRESSURE: 134 MMHG | RESPIRATION RATE: 16 BRPM | WEIGHT: 147.6 LBS | DIASTOLIC BLOOD PRESSURE: 70 MMHG | BODY MASS INDEX: 25.2 KG/M2 | HEIGHT: 64 IN | TEMPERATURE: 98 F

## 2024-08-20 DIAGNOSIS — M81.0 AGE-RELATED OSTEOPOROSIS WITHOUT CURRENT PATHOLOGICAL FRACTURE: ICD-10-CM

## 2024-08-20 DIAGNOSIS — I10 ESSENTIAL HYPERTENSION: ICD-10-CM

## 2024-08-20 DIAGNOSIS — K44.9 HIATAL HERNIA: ICD-10-CM

## 2024-08-20 DIAGNOSIS — M54.50 CHRONIC LOW BACK PAIN WITHOUT SCIATICA, UNSPECIFIED BACK PAIN LATERALITY: ICD-10-CM

## 2024-08-20 DIAGNOSIS — E78.2 MIXED HYPERLIPIDEMIA: ICD-10-CM

## 2024-08-20 DIAGNOSIS — Z13.6 SCREENING FOR HEART DISEASE: ICD-10-CM

## 2024-08-20 DIAGNOSIS — Z71.89 ACP (ADVANCE CARE PLANNING): ICD-10-CM

## 2024-08-20 DIAGNOSIS — M47.816 OSTEOARTHRITIS OF LUMBAR SPINE, UNSPECIFIED SPINAL OSTEOARTHRITIS COMPLICATION STATUS: ICD-10-CM

## 2024-08-20 DIAGNOSIS — M47.812 OSTEOARTHRITIS OF CERVICAL SPINE, UNSPECIFIED SPINAL OSTEOARTHRITIS COMPLICATION STATUS: ICD-10-CM

## 2024-08-20 DIAGNOSIS — Z00.00 MEDICARE ANNUAL WELLNESS VISIT, SUBSEQUENT: Primary | ICD-10-CM

## 2024-08-20 DIAGNOSIS — E55.9 VITAMIN D DEFICIENCY: ICD-10-CM

## 2024-08-20 DIAGNOSIS — G89.29 CHRONIC LOW BACK PAIN WITHOUT SCIATICA, UNSPECIFIED BACK PAIN LATERALITY: ICD-10-CM

## 2024-08-20 DIAGNOSIS — K21.9 GASTROESOPHAGEAL REFLUX DISEASE WITHOUT ESOPHAGITIS: ICD-10-CM

## 2024-08-20 LAB
25(OH)D3 SERPL-MCNC: 37.9 NG/ML (ref 30–100)
ALBUMIN SERPL-MCNC: 4.4 G/DL (ref 3.5–5)
ALBUMIN/GLOB SERPL: 1.1 (ref 1.1–2.2)
ALP SERPL-CCNC: 107 U/L (ref 45–117)
ALT SERPL-CCNC: 35 U/L (ref 12–78)
ANION GAP SERPL CALC-SCNC: 9 MMOL/L (ref 5–15)
APPEARANCE UR: CLEAR
AST SERPL-CCNC: 28 U/L (ref 15–37)
BASOPHILS # BLD: 0.1 K/UL (ref 0–0.1)
BASOPHILS NFR BLD: 1 % (ref 0–1)
BILIRUB SERPL-MCNC: 1.1 MG/DL (ref 0.2–1)
BILIRUB UR QL: NEGATIVE
BUN SERPL-MCNC: 13 MG/DL (ref 6–20)
BUN/CREAT SERPL: 17 (ref 12–20)
CALCIUM SERPL-MCNC: 9.7 MG/DL (ref 8.5–10.1)
CHLORIDE SERPL-SCNC: 98 MMOL/L (ref 97–108)
CHOLEST SERPL-MCNC: 179 MG/DL
CO2 SERPL-SCNC: 29 MMOL/L (ref 21–32)
COLOR UR: NORMAL
CREAT SERPL-MCNC: 0.76 MG/DL (ref 0.55–1.02)
DIFFERENTIAL METHOD BLD: NORMAL
EOSINOPHIL # BLD: 0.2 K/UL (ref 0–0.4)
EOSINOPHIL NFR BLD: 3 % (ref 0–7)
ERYTHROCYTE [DISTWIDTH] IN BLOOD BY AUTOMATED COUNT: 12.2 % (ref 11.5–14.5)
GLOBULIN SER CALC-MCNC: 4 G/DL (ref 2–4)
GLUCOSE SERPL-MCNC: 93 MG/DL (ref 65–100)
GLUCOSE UR STRIP.AUTO-MCNC: NEGATIVE MG/DL
HCT VFR BLD AUTO: 45.5 % (ref 35–47)
HDLC SERPL-MCNC: 82 MG/DL
HDLC SERPL: 2.2 (ref 0–5)
HGB BLD-MCNC: 14.9 G/DL (ref 11.5–16)
HGB UR QL STRIP: NEGATIVE
IMM GRANULOCYTES # BLD AUTO: 0 K/UL (ref 0–0.04)
IMM GRANULOCYTES NFR BLD AUTO: 0 % (ref 0–0.5)
KETONES UR QL STRIP.AUTO: NEGATIVE MG/DL
LDLC SERPL CALC-MCNC: 84 MG/DL (ref 0–100)
LEUKOCYTE ESTERASE UR QL STRIP.AUTO: NEGATIVE
LYMPHOCYTES # BLD: 1.7 K/UL (ref 0.8–3.5)
LYMPHOCYTES NFR BLD: 29 % (ref 12–49)
MCH RBC QN AUTO: 31.7 PG (ref 26–34)
MCHC RBC AUTO-ENTMCNC: 32.7 G/DL (ref 30–36.5)
MCV RBC AUTO: 96.8 FL (ref 80–99)
MONOCYTES # BLD: 0.5 K/UL (ref 0–1)
MONOCYTES NFR BLD: 9 % (ref 5–13)
NEUTS SEG # BLD: 3.4 K/UL (ref 1.8–8)
NEUTS SEG NFR BLD: 58 % (ref 32–75)
NITRITE UR QL STRIP.AUTO: NEGATIVE
NRBC # BLD: 0 K/UL (ref 0–0.01)
NRBC BLD-RTO: 0 PER 100 WBC
PH UR STRIP: 6.5 (ref 5–8)
PLATELET # BLD AUTO: 299 K/UL (ref 150–400)
PMV BLD AUTO: 10.6 FL (ref 8.9–12.9)
POTASSIUM SERPL-SCNC: 4 MMOL/L (ref 3.5–5.1)
PROT SERPL-MCNC: 8.4 G/DL (ref 6.4–8.2)
PROT UR STRIP-MCNC: NEGATIVE MG/DL
RBC # BLD AUTO: 4.7 M/UL (ref 3.8–5.2)
SODIUM SERPL-SCNC: 136 MMOL/L (ref 136–145)
SP GR UR REFRACTOMETRY: 1.01 (ref 1–1.03)
TRIGL SERPL-MCNC: 65 MG/DL
TSH SERPL DL<=0.05 MIU/L-ACNC: 1.95 UIU/ML (ref 0.36–3.74)
UROBILINOGEN UR QL STRIP.AUTO: 0.2 EU/DL (ref 0.2–1)
VLDLC SERPL CALC-MCNC: 13 MG/DL
WBC # BLD AUTO: 5.9 K/UL (ref 3.6–11)

## 2024-08-20 PROCEDURE — 1090F PRES/ABSN URINE INCON ASSESS: CPT | Performed by: NURSE PRACTITIONER

## 2024-08-20 PROCEDURE — G0439 PPPS, SUBSEQ VISIT: HCPCS | Performed by: NURSE PRACTITIONER

## 2024-08-20 PROCEDURE — 99212 OFFICE O/P EST SF 10 MIN: CPT | Performed by: NURSE PRACTITIONER

## 2024-08-20 PROCEDURE — G8427 DOCREV CUR MEDS BY ELIG CLIN: HCPCS | Performed by: NURSE PRACTITIONER

## 2024-08-20 PROCEDURE — 1123F ACP DISCUSS/DSCN MKR DOCD: CPT | Performed by: NURSE PRACTITIONER

## 2024-08-20 PROCEDURE — 3078F DIAST BP <80 MM HG: CPT | Performed by: NURSE PRACTITIONER

## 2024-08-20 PROCEDURE — G8399 PT W/DXA RESULTS DOCUMENT: HCPCS | Performed by: NURSE PRACTITIONER

## 2024-08-20 PROCEDURE — G8419 CALC BMI OUT NRM PARAM NOF/U: HCPCS | Performed by: NURSE PRACTITIONER

## 2024-08-20 PROCEDURE — 3075F SYST BP GE 130 - 139MM HG: CPT | Performed by: NURSE PRACTITIONER

## 2024-08-20 PROCEDURE — 1036F TOBACCO NON-USER: CPT | Performed by: NURSE PRACTITIONER

## 2024-08-20 RX ORDER — BENAZEPRIL/HYDROCHLOROTHIAZIDE 20 MG-25MG
1 TABLET ORAL DAILY
Qty: 90 TABLET | Refills: 1 | Status: SHIPPED | OUTPATIENT
Start: 2024-08-20 | End: 2024-08-23 | Stop reason: SDUPTHER

## 2024-08-20 SDOH — ECONOMIC STABILITY: FOOD INSECURITY: WITHIN THE PAST 12 MONTHS, YOU WORRIED THAT YOUR FOOD WOULD RUN OUT BEFORE YOU GOT MONEY TO BUY MORE.: NEVER TRUE

## 2024-08-20 SDOH — ECONOMIC STABILITY: FOOD INSECURITY: WITHIN THE PAST 12 MONTHS, THE FOOD YOU BOUGHT JUST DIDN'T LAST AND YOU DIDN'T HAVE MONEY TO GET MORE.: NEVER TRUE

## 2024-08-20 SDOH — ECONOMIC STABILITY: INCOME INSECURITY: HOW HARD IS IT FOR YOU TO PAY FOR THE VERY BASICS LIKE FOOD, HOUSING, MEDICAL CARE, AND HEATING?: NOT HARD AT ALL

## 2024-08-20 ASSESSMENT — PATIENT HEALTH QUESTIONNAIRE - PHQ9
SUM OF ALL RESPONSES TO PHQ QUESTIONS 1-9: 0
2. FEELING DOWN, DEPRESSED OR HOPELESS: NOT AT ALL
1. LITTLE INTEREST OR PLEASURE IN DOING THINGS: NOT AT ALL
SUM OF ALL RESPONSES TO PHQ QUESTIONS 1-9: 0
SUM OF ALL RESPONSES TO PHQ9 QUESTIONS 1 & 2: 0

## 2024-08-20 ASSESSMENT — LIFESTYLE VARIABLES
HOW OFTEN DO YOU HAVE A DRINK CONTAINING ALCOHOL: NEVER
HOW MANY STANDARD DRINKS CONTAINING ALCOHOL DO YOU HAVE ON A TYPICAL DAY: PATIENT DOES NOT DRINK

## 2024-08-20 NOTE — PROGRESS NOTES
Medicare Annual Wellness Visit    Ct Tijerina is here for Medicare AWV    Assessment & Plan   Medicare annual wellness visit, subsequent  Essential hypertension  -     CBC with Auto Differential; Future  -     Comprehensive Metabolic Panel; Future  -     Urinalysis; Future  -     TSH; Future  -     benazepril-hydrochlorthiazide (LOTENSIN HCT) 20-25 MG per tablet; Take 1 tablet by mouth daily, Disp-90 tablet, R-1Normal  Mixed hyperlipidemia  -     Comprehensive Metabolic Panel; Future  -     Lipid Panel; Future  Gastroesophageal reflux disease without esophagitis  Hiatal hernia  Vitamin D deficiency  -     Vitamin D 25 Hydroxy; Future  Osteoarthritis of cervical spine, unspecified spinal osteoarthritis complication status  -     Mark Wasserman MD, Pain Medicine, [facility] OFFICE OUTPATIENT VISIT 10 MINUTES [73950]  Osteoarthritis of lumbar spine, unspecified spinal osteoarthritis complication status  -     Mark Wasserman MD, Pain Medicine, [facility] OFFICE OUTPATIENT VISIT 10 MINUTES [[id number]]  Chronic low back pain without sciatica, unspecified back pain laterality  -     Mark Wasserman MD, Pain Medicine, [facility] OFFICE OUTPATIENT VISIT 10 MINUTES [[id number]]  Age-related osteoporosis without current pathological fracture  -     Vitamin D 25 Hydroxy; Future  ACP (advance care planning)    Recommendations for Preventive Services Due: see orders and patient instructions/AVS.  Recommended screening schedule for the next 5-10 years is provided to the patient in written form: see Patient Instructions/AVS.     No follow-ups on file.     Subjective   The following acute and/or chronic problems were also addressed today: The patient complains of generalized/worsening pain to her neck and low back.  She states she has had a history of chronic DJD of these areas in the past but has become more problematic over the past 6 months.  She has been using some over-the-counter

## 2024-08-20 NOTE — PROGRESS NOTES
Ct Tijerina is a 78 y.o. female     Chief Complaint   Patient presents with    Medicare AWV       /70 (Site: Left Upper Arm, Position: Sitting, Cuff Size: Medium Adult)   Pulse 60   Temp 98 °F (36.7 °C) (Oral)   Resp 16   Ht 1.62 m (5' 3.78\")   Wt 67 kg (147 lb 9.6 oz)   SpO2 98%   BMI 25.51 kg/m²     Health Maintenance Due   Topic Date Due    Pneumococcal 65+ years Vaccine (2 of 2 - PPSV23 or PCV20) 09/10/2016    COVID-19 Vaccine (5 - 2023-24 season) 09/01/2023    Annual Wellness Visit (Medicare)  11/27/2023    Flu vaccine (1) 08/01/2024         \"Have you been to the ER, urgent care clinic since your last visit?  Hospitalized since your last visit?\"    NO    “Have you seen or consulted any other health care providers outside of [facility] since your last visit?”    NO

## 2024-08-20 NOTE — PATIENT INSTRUCTIONS
Learning About Being Active as an Older Adult  Why is being active important as you get older?     Being active is one of the best things you can do for your health. And it's never too late to start. Being active--or getting active, if you aren't already--has definite benefits. It can:  Give you more energy,  Keep your mind sharp.  Improve balance to reduce your risk of falls.  Help you manage chronic illness with fewer medicines.  No matter how old you are, how fit you are, or what health problems you have, there is a form of activity that will work for you. And the more physical activity you can do, the better your overall health will be.  What kinds of activity can help you stay healthy?  Being more active will make your daily activities easier. Physical activity includes planned exercise and things you do in daily life. There are four types of activity:  Aerobic.  Doing aerobic activity makes your heart and lungs strong.  Includes walking, dancing, and gardening.  Aim for at least 2½ hours spread throughout the week.  It improves your energy and can help you sleep better.  Muscle-strengthening.  This type of activity can help maintain muscle and strengthen bones.  Includes climbing stairs, using resistance bands, and lifting or carrying heavy loads.  Aim for at least twice a week.  It can help protect the knees and other joints.  Stretching.  Stretching gives you better range of motion in joints and muscles.  Includes upper arm stretches, calf stretches, and gentle yoga.  Aim for at least twice a week, preferably after your muscles are warmed up from other activities.  It can help you function better in daily life.  Balancing.  This helps you stay coordinated and have good posture.  Includes heel-to-toe walking, wang chi, and certain types of yoga.  Aim for at least 3 days a week.  It can reduce your risk of falling.  Even if you have a hard time meeting the recommendations, it's better to be more active

## 2024-08-23 RX ORDER — BENAZEPRIL/HYDROCHLOROTHIAZIDE 20 MG-25MG
1 TABLET ORAL DAILY
Qty: 90 TABLET | Refills: 1 | Status: SHIPPED | OUTPATIENT
Start: 2024-08-23

## 2024-09-04 ENCOUNTER — HOSPITAL ENCOUNTER (OUTPATIENT)
Facility: HOSPITAL | Age: 79
Discharge: HOME | End: 2024-09-07
Payer: MEDICARE

## 2024-09-04 DIAGNOSIS — Z13.6 SCREENING FOR HEART DISEASE: ICD-10-CM

## 2024-09-04 DIAGNOSIS — I10 ESSENTIAL HYPERTENSION: ICD-10-CM

## 2024-09-04 DIAGNOSIS — E78.2 MIXED HYPERLIPIDEMIA: ICD-10-CM

## 2024-09-04 PROCEDURE — 75571 CT HRT W/O DYE W/CA TEST: CPT

## 2024-09-20 ENCOUNTER — HOSPITAL ENCOUNTER (OUTPATIENT)
Facility: HOSPITAL | Age: 79
Discharge: HOME | End: 2024-09-23
Attending: PAIN MEDICINE
Payer: MEDICARE

## 2024-09-20 DIAGNOSIS — M54.16 RADICULOPATHY, LUMBAR REGION: ICD-10-CM

## 2024-09-20 PROCEDURE — 72148 MRI LUMBAR SPINE W/O DYE: CPT

## 2024-11-12 ENCOUNTER — HOSPITAL ENCOUNTER (OUTPATIENT)
Facility: HOSPITAL | Age: 79
Discharge: HOME | End: 2024-11-15
Attending: PAIN MEDICINE
Payer: MEDICARE

## 2024-11-12 DIAGNOSIS — M54.12 RADICULOPATHY, CERVICAL REGION: ICD-10-CM

## 2024-11-12 PROCEDURE — 72141 MRI NECK SPINE W/O DYE: CPT

## 2024-11-18 RX ORDER — ATORVASTATIN CALCIUM 10 MG/1
1 TABLET, FILM COATED ORAL DAILY
COMMUNITY
End: 2024-11-18 | Stop reason: SDUPTHER

## 2024-11-18 RX ORDER — ATORVASTATIN CALCIUM 10 MG/1
10 TABLET, FILM COATED ORAL DAILY
Qty: 90 TABLET | Refills: 1 | Status: SHIPPED | OUTPATIENT
Start: 2024-11-18

## 2024-11-18 NOTE — TELEPHONE ENCOUNTER
atorvastatin (LIPITOR) 10 mg tablet 90 Tablet 3 4/10/2023 4/4/2024    Sig - Route: Take 1 Tablet by mouth nightly for 360 days. - Oral      Last visit 8/20/24  Future appt 2/21/25  Pharmacy [facility]

## 2025-02-12 DIAGNOSIS — I10 ESSENTIAL HYPERTENSION: ICD-10-CM

## 2025-02-12 RX ORDER — HYDROCHLOROTHIAZIDE 25 MG/1
25 TABLET ORAL EVERY MORNING
Qty: 90 TABLET | Refills: 0 | Status: SHIPPED | OUTPATIENT
Start: 2025-02-12

## 2025-02-12 NOTE — TELEPHONE ENCOUNTER
PCP: Cm Child APRN - NP    Last appt: 8/20/2024    Future Appointments   Date Time Provider Department Center   2/21/2025  9:00 AM Cm Child APRN - NP [facility]   7/3/2025 11:00 AM [facility] MI[facility]LA 1 [facility]       Requested Prescriptions     Pending Prescriptions Disp Refills    hydroCHLOROthiazide (HYDRODIURIL) 25 MG tablet [Pharmacy Med Name: hydroCHLOROthiazide 25 MG Oral Tablet] 90 tablet 0     Sig: TAKE 1 TABLET BY MOUTH ONCE DAILY IN THE MORNING

## 2025-02-16 DIAGNOSIS — I10 ESSENTIAL HYPERTENSION: ICD-10-CM

## 2025-02-17 RX ORDER — BENAZEPRIL HYDROCHLORIDE 20 MG/1
20 TABLET ORAL DAILY
Qty: 90 TABLET | Refills: 1 | Status: SHIPPED | OUTPATIENT
Start: 2025-02-17

## 2025-02-17 NOTE — TELEPHONE ENCOUNTER
PCP: Cm Child APRN - NP    Last appt: 8/20/2024    Future Appointments   Date Time Provider Department Center   2/21/2025  9:00 AM Cm Child APRN - NP [facility]   7/3/2025 11:00 AM [facility] MIRELA 1 [facility]       Requested Prescriptions     Pending Prescriptions Disp Refills    benazepril (LOTENSIN) 20 MG tablet [Pharmacy Med Name: Benazepril HCl 20 MG Oral Tablet] 90 tablet 1     Sig: Take 1 tablet by mouth once daily

## 2025-02-18 SDOH — ECONOMIC STABILITY: FOOD INSECURITY: WITHIN THE PAST 12 MONTHS, THE FOOD YOU BOUGHT JUST DIDN'T LAST AND YOU DIDN'T HAVE MONEY TO GET MORE.: NEVER TRUE

## 2025-02-18 SDOH — ECONOMIC STABILITY: INCOME INSECURITY: IN THE LAST 12 MONTHS, WAS THERE A TIME WHEN YOU WERE NOT ABLE TO PAY THE MORTGAGE OR RENT ON TIME?: NO

## 2025-02-18 SDOH — ECONOMIC STABILITY: FOOD INSECURITY: WITHIN THE PAST 12 MONTHS, YOU WORRIED THAT YOUR FOOD WOULD RUN OUT BEFORE YOU GOT MONEY TO BUY MORE.: NEVER TRUE

## 2025-02-18 SDOH — ECONOMIC STABILITY: TRANSPORTATION INSECURITY
IN THE PAST 12 MONTHS, HAS THE LACK OF TRANSPORTATION KEPT YOU FROM MEDICAL APPOINTMENTS OR FROM GETTING MEDICATIONS?: NO

## 2025-02-21 ENCOUNTER — OFFICE VISIT (OUTPATIENT)
Facility: CLINIC | Age: 80
End: 2025-02-21

## 2025-02-21 VITALS
WEIGHT: 145.2 LBS | HEART RATE: 54 BPM | HEIGHT: 64 IN | BODY MASS INDEX: 24.79 KG/M2 | OXYGEN SATURATION: 98 % | SYSTOLIC BLOOD PRESSURE: 122 MMHG | DIASTOLIC BLOOD PRESSURE: 70 MMHG | RESPIRATION RATE: 16 BRPM | TEMPERATURE: 97.7 F

## 2025-02-21 DIAGNOSIS — I25.10 ATHEROSCLEROSIS OF NATIVE CORONARY ARTERY OF NATIVE HEART WITHOUT ANGINA PECTORIS: ICD-10-CM

## 2025-02-21 DIAGNOSIS — M81.0 AGE-RELATED OSTEOPOROSIS WITHOUT CURRENT PATHOLOGICAL FRACTURE: ICD-10-CM

## 2025-02-21 DIAGNOSIS — Z23 NEED FOR PNEUMOCOCCAL VACCINATION: ICD-10-CM

## 2025-02-21 DIAGNOSIS — E78.2 MIXED HYPERLIPIDEMIA: ICD-10-CM

## 2025-02-21 DIAGNOSIS — I10 ESSENTIAL HYPERTENSION: Primary | ICD-10-CM

## 2025-02-21 DIAGNOSIS — K21.9 GASTROESOPHAGEAL REFLUX DISEASE WITHOUT ESOPHAGITIS: ICD-10-CM

## 2025-02-21 LAB
ALBUMIN SERPL-MCNC: 4.2 G/DL (ref 3.5–5)
ALBUMIN/GLOB SERPL: 1.2 (ref 1.1–2.2)
ALP SERPL-CCNC: 62 U/L (ref 45–117)
ALT SERPL-CCNC: 40 U/L (ref 12–78)
ANION GAP SERPL CALC-SCNC: 5 MMOL/L (ref 2–12)
AST SERPL-CCNC: 30 U/L (ref 15–37)
BILIRUB SERPL-MCNC: 1 MG/DL (ref 0.2–1)
BUN SERPL-MCNC: 13 MG/DL (ref 6–20)
BUN/CREAT SERPL: 19 (ref 12–20)
CALCIUM SERPL-MCNC: 9.7 MG/DL (ref 8.5–10.1)
CHLORIDE SERPL-SCNC: 97 MMOL/L (ref 97–108)
CHOLEST SERPL-MCNC: 147 MG/DL
CO2 SERPL-SCNC: 30 MMOL/L (ref 21–32)
CREAT SERPL-MCNC: 0.68 MG/DL (ref 0.55–1.02)
GLOBULIN SER CALC-MCNC: 3.4 G/DL (ref 2–4)
GLUCOSE SERPL-MCNC: 93 MG/DL (ref 65–100)
HDLC SERPL-MCNC: 66 MG/DL
HDLC SERPL: 2.2 (ref 0–5)
LDLC SERPL CALC-MCNC: 64.2 MG/DL (ref 0–100)
POTASSIUM SERPL-SCNC: 4 MMOL/L (ref 3.5–5.1)
PROT SERPL-MCNC: 7.6 G/DL (ref 6.4–8.2)
SODIUM SERPL-SCNC: 132 MMOL/L (ref 136–145)
TRIGL SERPL-MCNC: 84 MG/DL
VLDLC SERPL CALC-MCNC: 16.8 MG/DL

## 2025-02-21 RX ORDER — ASPIRIN 81 MG/1
81 TABLET ORAL DAILY
COMMUNITY

## 2025-02-21 ASSESSMENT — PATIENT HEALTH QUESTIONNAIRE - PHQ9
SUM OF ALL RESPONSES TO PHQ QUESTIONS 1-9: 0
2. FEELING DOWN, DEPRESSED OR HOPELESS: NOT AT ALL
SUM OF ALL RESPONSES TO PHQ9 QUESTIONS 1 & 2: 0
SUM OF ALL RESPONSES TO PHQ QUESTIONS 1-9: 0
SUM OF ALL RESPONSES TO PHQ QUESTIONS 1-9: 0
1. LITTLE INTEREST OR PLEASURE IN DOING THINGS: NOT AT ALL
SUM OF ALL RESPONSES TO PHQ QUESTIONS 1-9: 0

## 2025-02-21 NOTE — PROGRESS NOTES
Chief Complaint   Patient presents with    Hypertension       SUBJECTIVE:    Ct Tijerina is a 79 y.o. female who is here today for a follow up appointment regarding current medical conditions including: Essential hypertension, coronary atherosclerosis, mixed hyperlipidemia, GERD, osteoporosis, and is requesting pneumonia vaccination today.  She is fasting today.    The patient has a longstanding history of hypertension and is currently on a combination of benazepril, hydrochlorothiazide, and metoprolol succinate.  She is being followed by cardiology, and is doing home blood pressure monitoring daily.  Her blood pressures have remained fairly stable overall with some variable fluctuations, according to the patient.  She denies any recent episodes of chest pain, chest pressure, shortness of breath, headaches, dizziness, blurred vision, palpitations, or syncope episodes.  She remains on atorvastatin due to her history of mixed hyperlipidemia.  The patient also underwent cardiac calcium scoring and was found to have a score of \"200\" in her left anterior descending coronary artery.  She was told to follow-up with cardiology about this.  She has a pending appointment with them.    She remains on omeprazole daily for management of her GERD.  She states the medication continues to be effective and her symptoms are well-controlled.  She denies any adverse side effects of the medication.    She remains on Prolia subcutaneous injections every 6 months for management of her osteoporosis.  She has been tolerating this well.    EXAM: CT CARDIAC CALCIUM SCORING - 09/04/2024     INDICATION: Essential (primary) hypertension; Mixed hyperlipidemia; Encounter  for screening for cardiovascular disorders. Essential (primary) hypertension     COMPARISON: None.     TECHNIQUE: Unenhanced multislice helical CT of the heart was performed on a  64-slice multiple detector row CT system ([employer] ABT). Quantitative coronary artery  CT calcium

## 2025-02-21 NOTE — PROGRESS NOTES
Ct Tijerina is a 79 y.o. female     Chief Complaint   Patient presents with    Hypertension       /70 (Site: Left Upper Arm, Position: Sitting, Cuff Size: Medium Adult)   Pulse 54   Temp 97.7 °F (36.5 °C) (Oral)   Resp 16   Ht 1.62 m (5' 3.78\")   Wt 65.9 kg (145 lb 3.2 oz)   SpO2 98%   BMI 25.10 kg/m²     Health Maintenance Due   Topic Date Due    Pneumococcal 50+ years Vaccine (2 of 2 - PPSV23) 09/10/2016    COVID-19 Vaccine (5 - 2024-25 season) 09/01/2024         \"Have you been to the ER, urgent care clinic since your last visit?  Hospitalized since your last visit?\"    NO    “Have you seen or consulted any other health care providers outside of [facility] since your last visit?”    NO     Administered Prevnar 20 in left deltoid patient tolerated injection well.    Lot#:[id number]    Exp:4/30/2026    NDC:5494-2609-87

## 2025-04-24 ENCOUNTER — TRANSCRIBE ORDERS (OUTPATIENT)
Facility: HOSPITAL | Age: 80
End: 2025-04-24

## 2025-04-24 DIAGNOSIS — M25.362 INSTABILITY OF LEFT KNEE JOINT: ICD-10-CM

## 2025-04-24 DIAGNOSIS — M25.462 EFFUSION OF LEFT KNEE: ICD-10-CM

## 2025-04-24 DIAGNOSIS — M17.12 PRIMARY OSTEOARTHRITIS OF LEFT KNEE: Primary | ICD-10-CM

## 2025-05-02 ENCOUNTER — HOSPITAL ENCOUNTER (OUTPATIENT)
Facility: HOSPITAL | Age: 80
Discharge: HOME | End: 2025-05-02
Attending: ORTHOPAEDIC SURGERY
Payer: MEDICARE

## 2025-05-02 DIAGNOSIS — M17.12 PRIMARY OSTEOARTHRITIS OF LEFT KNEE: ICD-10-CM

## 2025-05-02 DIAGNOSIS — M25.462 EFFUSION OF LEFT KNEE: ICD-10-CM

## 2025-05-02 DIAGNOSIS — M25.362 INSTABILITY OF LEFT KNEE JOINT: ICD-10-CM

## 2025-05-02 PROCEDURE — 73721 MRI JNT OF LWR EXTRE W/O DYE: CPT

## 2025-05-09 DIAGNOSIS — I10 ESSENTIAL HYPERTENSION: ICD-10-CM

## 2025-05-09 RX ORDER — HYDROCHLOROTHIAZIDE 25 MG/1
25 TABLET ORAL EVERY MORNING
Qty: 90 TABLET | Refills: 1 | Status: SHIPPED | OUTPATIENT
Start: 2025-05-09

## 2025-05-09 RX ORDER — ATORVASTATIN CALCIUM 10 MG/1
10 TABLET, FILM COATED ORAL DAILY
Qty: 90 TABLET | Refills: 1 | Status: SHIPPED | OUTPATIENT
Start: 2025-05-09

## 2025-05-09 NOTE — TELEPHONE ENCOUNTER
PCP: Cm Child APRN - NP    Last appt: 2/21/2025    Future Appointments   Date Time Provider Department Center   7/3/2025 11:00 AM [facility] 1 [facility]   8/22/2025  9:00 AM Cm Child APRN - NP [facility]       Requested Prescriptions     Pending Prescriptions Disp Refills    atorvastatin (LIPITOR) 10 MG tablet [Pharmacy Med Name: Atorvastatin Calcium 10 MG Oral Tablet] 90 tablet 1     Sig: Take 1 tablet by mouth once daily    hydroCHLOROthiazide (HYDRODIURIL) 25 MG tablet [Pharmacy Med Name: hydroCHLOROthiazide 25 MG Oral Tablet] 90 tablet 1     Sig: TAKE 1 TABLET BY MOUTH ONCE DAILY IN THE MORNING

## 2025-07-03 ENCOUNTER — HOSPITAL ENCOUNTER (OUTPATIENT)
Facility: HOSPITAL | Age: 80
Discharge: HOME | End: 2025-07-03
Attending: INTERNAL MEDICINE
Payer: MEDICARE

## 2025-07-03 DIAGNOSIS — Z12.31 ENCOUNTER FOR SCREENING MAMMOGRAM FOR HIGH-RISK PATIENT: ICD-10-CM

## 2025-07-03 DIAGNOSIS — Z85.3 PERSONAL HISTORY OF MALIGNANT NEOPLASM OF BREAST: ICD-10-CM

## 2025-07-03 PROCEDURE — 77067 SCR MAMMO BI INCL CAD: CPT

## 2025-07-03 PROCEDURE — 77063 BREAST TOMOSYNTHESIS BI: CPT

## 2025-08-08 DIAGNOSIS — I10 ESSENTIAL HYPERTENSION: ICD-10-CM

## 2025-08-08 RX ORDER — BENAZEPRIL HYDROCHLORIDE 20 MG/1
20 TABLET ORAL DAILY
Qty: 90 TABLET | Refills: 0 | Status: SHIPPED | OUTPATIENT
Start: 2025-08-08

## 2025-08-15 SDOH — HEALTH STABILITY: PHYSICAL HEALTH
ON AVERAGE, HOW MANY DAYS PER WEEK DO YOU ENGAGE IN MODERATE TO STRENUOUS EXERCISE (LIKE A BRISK WALK)?: PATIENT DECLINED

## 2025-08-15 ASSESSMENT — LIFESTYLE VARIABLES
HOW OFTEN DO YOU HAVE A DRINK CONTAINING ALCOHOL: NEVER
HOW OFTEN DO YOU HAVE A DRINK CONTAINING ALCOHOL: 1
HOW OFTEN DO YOU HAVE SIX OR MORE DRINKS ON ONE OCCASION: 1
HOW MANY STANDARD DRINKS CONTAINING ALCOHOL DO YOU HAVE ON A TYPICAL DAY: 0
HOW MANY STANDARD DRINKS CONTAINING ALCOHOL DO YOU HAVE ON A TYPICAL DAY: PATIENT DOES NOT DRINK

## 2025-08-15 ASSESSMENT — PATIENT HEALTH QUESTIONNAIRE - PHQ9
SUM OF ALL RESPONSES TO PHQ QUESTIONS 1-9: 0
2. FEELING DOWN, DEPRESSED OR HOPELESS: NOT AT ALL
SUM OF ALL RESPONSES TO PHQ QUESTIONS 1-9: 0
SUM OF ALL RESPONSES TO PHQ QUESTIONS 1-9: 0
1. LITTLE INTEREST OR PLEASURE IN DOING THINGS: NOT AT ALL
SUM OF ALL RESPONSES TO PHQ QUESTIONS 1-9: 0

## 2025-08-22 ENCOUNTER — OFFICE VISIT (OUTPATIENT)
Facility: CLINIC | Age: 80
End: 2025-08-22

## 2025-08-22 VITALS
HEIGHT: 64 IN | RESPIRATION RATE: 17 BRPM | DIASTOLIC BLOOD PRESSURE: 58 MMHG | WEIGHT: 148.8 LBS | BODY MASS INDEX: 25.4 KG/M2 | HEART RATE: 56 BPM | TEMPERATURE: 98.2 F | OXYGEN SATURATION: 97 % | SYSTOLIC BLOOD PRESSURE: 132 MMHG

## 2025-08-22 DIAGNOSIS — K44.9 HIATAL HERNIA WITH GERD: ICD-10-CM

## 2025-08-22 DIAGNOSIS — Z91.81 HISTORY OF FALL: ICD-10-CM

## 2025-08-22 DIAGNOSIS — I25.10 ATHEROSCLEROSIS OF NATIVE CORONARY ARTERY OF NATIVE HEART WITHOUT ANGINA PECTORIS: ICD-10-CM

## 2025-08-22 DIAGNOSIS — Z99.89 DOES MOBILIZE USING CANE: ICD-10-CM

## 2025-08-22 DIAGNOSIS — R29.898 LEFT LEG WEAKNESS: ICD-10-CM

## 2025-08-22 DIAGNOSIS — I10 ESSENTIAL HYPERTENSION: ICD-10-CM

## 2025-08-22 DIAGNOSIS — M81.0 AGE-RELATED OSTEOPOROSIS WITHOUT CURRENT PATHOLOGICAL FRACTURE: ICD-10-CM

## 2025-08-22 DIAGNOSIS — M25.562 CHRONIC PAIN OF LEFT KNEE: ICD-10-CM

## 2025-08-22 DIAGNOSIS — E78.2 MIXED HYPERLIPIDEMIA: ICD-10-CM

## 2025-08-22 DIAGNOSIS — G89.29 CHRONIC PAIN OF LEFT KNEE: ICD-10-CM

## 2025-08-22 DIAGNOSIS — I49.9 IRREGULAR CARDIAC RHYTHM: ICD-10-CM

## 2025-08-22 DIAGNOSIS — Z00.00 MEDICARE ANNUAL WELLNESS VISIT, SUBSEQUENT: Primary | ICD-10-CM

## 2025-08-22 DIAGNOSIS — K21.9 HIATAL HERNIA WITH GERD: ICD-10-CM

## 2025-08-22 LAB
ALBUMIN SERPL-MCNC: 3.9 G/DL (ref 3.5–5.2)
ALBUMIN/GLOB SERPL: 1.3 (ref 1.1–2.2)
ALP SERPL-CCNC: 54 U/L (ref 35–104)
ALT SERPL-CCNC: 34 U/L (ref 10–35)
ANION GAP SERPL CALC-SCNC: 12 MMOL/L (ref 2–14)
AST SERPL-CCNC: 42 U/L (ref 10–35)
BASOPHILS # BLD: 0.05 K/UL (ref 0–0.1)
BASOPHILS NFR BLD: 0.8 % (ref 0–1)
BILIRUB SERPL-MCNC: 0.7 MG/DL (ref 0–1.2)
BUN SERPL-MCNC: 14 MG/DL (ref 8–23)
CALCIUM SERPL-MCNC: 9.4 MG/DL (ref 8.8–10.2)
CHLORIDE SERPL-SCNC: 99 MMOL/L (ref 98–107)
CHOLEST SERPL-MCNC: 128 MG/DL (ref 0–200)
CO2 SERPL-SCNC: 24 MMOL/L (ref 20–29)
CREAT SERPL-MCNC: 0.67 MG/DL (ref 0.6–1)
DIFFERENTIAL METHOD BLD: NORMAL
EOSINOPHIL # BLD: 0.29 K/UL (ref 0–0.4)
EOSINOPHIL NFR BLD: 4.5 % (ref 0–7)
ERYTHROCYTE [DISTWIDTH] IN BLOOD BY AUTOMATED COUNT: 12.1 % (ref 11.5–14.5)
GLOBULIN SER CALC-MCNC: 2.9 G/DL (ref 2–4)
GLUCOSE SERPL-MCNC: 93 MG/DL (ref 65–100)
HCT VFR BLD AUTO: 42.1 % (ref 35–47)
HDLC SERPL-MCNC: 59 MG/DL (ref 40–60)
HDLC SERPL: 2.2 (ref 0–5)
HGB BLD-MCNC: 13.9 G/DL (ref 11.5–16)
IMM GRANULOCYTES # BLD AUTO: 0.01 K/UL (ref 0–0.04)
IMM GRANULOCYTES NFR BLD AUTO: 0.2 % (ref 0–0.5)
LDLC SERPL CALC-MCNC: 58 MG/DL (ref 0–100)
LYMPHOCYTES # BLD: 1.64 K/UL (ref 0.8–3.5)
LYMPHOCYTES NFR BLD: 25.7 % (ref 12–49)
MCH RBC QN AUTO: 31.5 PG (ref 26–34)
MCHC RBC AUTO-ENTMCNC: 33 G/DL (ref 30–36.5)
MCV RBC AUTO: 95.5 FL (ref 80–99)
MONOCYTES # BLD: 0.69 K/UL (ref 0–1)
MONOCYTES NFR BLD: 10.8 % (ref 5–13)
NEUTS SEG # BLD: 3.71 K/UL (ref 1.8–8)
NEUTS SEG NFR BLD: 58 % (ref 32–75)
NRBC # BLD: 0 K/UL (ref 0–0.01)
NRBC BLD-RTO: 0 PER 100 WBC
PLATELET # BLD AUTO: 276 K/UL (ref 150–400)
PMV BLD AUTO: 11 FL (ref 8.9–12.9)
POTASSIUM SERPL-SCNC: 4.5 MMOL/L (ref 3.5–5.1)
PROT SERPL-MCNC: 6.8 G/DL (ref 6.4–8.3)
RBC # BLD AUTO: 4.41 M/UL (ref 3.8–5.2)
SODIUM SERPL-SCNC: 135 MMOL/L (ref 136–145)
TRIGL SERPL-MCNC: 57 MG/DL (ref 0–150)
VLDLC SERPL CALC-MCNC: 11 MG/DL
WBC # BLD AUTO: 6.4 K/UL (ref 3.6–11)

## 2025-08-22 RX ORDER — FAMOTIDINE 20 MG/1
20 TABLET, FILM COATED ORAL 2 TIMES DAILY
Qty: 180 TABLET | Refills: 1 | Status: SHIPPED | OUTPATIENT
Start: 2025-08-22

## (undated) DEVICE — DERMABOND SKIN ADH 0.7ML -- DERMABOND ADVANCED 12/BX

## (undated) DEVICE — TRANSFER SET 3": Brand: MEDLINE INDUSTRIES, INC.

## (undated) DEVICE — KIT POS FOAM HANA TBL

## (undated) DEVICE — CUFF BLD PRSS AD CLTH SGL TB W/ BAYNT CONN ROUNDED CORNER

## (undated) DEVICE — SUTURE VCRL SZ 2-0 L36IN ABSRB UD L36MM CT-1 1/2 CIR J945H

## (undated) DEVICE — GLOVE ORTHO 8   MSG9480

## (undated) DEVICE — CATHETER IV 22GA L1IN OD0.8382-0.9144MM ID0.6096-0.6858MM

## (undated) DEVICE — YANKAUER,SMOOTH HANDLE,HIGH CAPACITY: Brand: MEDLINE INDUSTRIES, INC.

## (undated) DEVICE — SUTURE STRATAFIX SYMMETRIC SZ 1 L18IN ABSRB VLT CT1 L36CM SXPP1A404

## (undated) DEVICE — BLADE ELECTRODE: Brand: EDGE

## (undated) DEVICE — SUTURE MCRYL SZ 3-0 L27IN ABSRB UD L24MM PS-1 3/8 CIR PRIM Y936H

## (undated) DEVICE — ELECTRODE PT RET AD L9FT HI MOIST COND ADH HYDRGEL CORDED

## (undated) DEVICE — SINGLE-USE BIOPSY FORCEPS: Brand: RADIAL JAW 4

## (undated) DEVICE — GOWN,SIRUS,NONRNF,SETINSLV,2XL,18/CS: Brand: MEDLINE

## (undated) DEVICE — SOLUTION IRRIG 1000ML 0.9% SOD CHL USP POUR PLAS BTL

## (undated) DEVICE — SOLUTION IRRIG 1000ML STRL H2O USP PLAS POUR BTL

## (undated) DEVICE — KIT TRK HIP PROC VIZADISC

## (undated) DEVICE — ENDOSCOPIC KIT COMPLIANCE ENDOKIT

## (undated) DEVICE — SUTURE VCRL SZ 1 L36IN ABSRB UD L36MM CT-1 1/2 CIR J947H

## (undated) DEVICE — SYSTEM REPROC CBL 3 LD DISPOSABLE

## (undated) DEVICE — IV START KIT: Brand: MEDLINE

## (undated) DEVICE — PREP SKN CHLRAPRP APL 26ML STR --

## (undated) DEVICE — TOTAL JOINT-MRMC: Brand: MEDLINE INDUSTRIES, INC.

## (undated) DEVICE — 450 ML BOTTLE OF 0.05% CHLORHEXIDINE GLUCONATE IN 99.95% STERILE WATER FOR IRRIGATION, USP AND APPLICATOR.: Brand: IRRISEPT ANTIMICROBIAL WOUND LAVAGE

## (undated) DEVICE — GLOVE SURG SZ 85 L12IN FNGR THK79MIL GRN LTX FREE

## (undated) DEVICE — KIT DRP FOR RIO ROBOTIC ARM ASST SYS

## (undated) DEVICE — SUTURE ABSRB L30CM 2-0 VLT SPRL PDS + STRATAFIX SXPP1B410

## (undated) DEVICE — STRYKER PERFORMANCE SERIES SAGITTAL BLADE: Brand: STRYKER PERFORMANCE SERIES

## (undated) DEVICE — PIN FIX 4X170MM STRL -- 2/PK MAKO

## (undated) DEVICE — DRESSING HYDROCOLLOID BORDER 35X10 IN ALUM PRIMASEAL

## (undated) DEVICE — 6619 2 PTNT ISO SYS INCISE AREA&LT;(&GT;&&LT;)&GT;P: Brand: STERI-DRAPE™ IOBAN™ 2

## (undated) DEVICE — MARKER RAD KNEE TIB CKPT STEREOTAXIC IMAG LESION LOC

## (undated) DEVICE — SPONGE GZ W4XL4IN COT 12 PLY TYP VII WVN C FLD DSGN

## (undated) DEVICE — CONTAINER SPEC 20 ML LID NEUT BUFF FORMALIN 10 % POLYPR STS

## (undated) DEVICE — HOOD: Brand: FLYTE

## (undated) DEVICE — SOL TOP ALC ISO 70% 4OZ --

## (undated) DEVICE — Device: Brand: JELCO

## (undated) DEVICE — SUTURE STRATAFIX SZ 3-0 30CM NONABSORB UD 26MM FS 3/8 SXMP2B412

## (undated) DEVICE — SOL MEDC H PEROX 3% 16OZ -- MEDICHOICE MEDLINE USE 176231

## (undated) DEVICE — SNAP KOVER: Brand: UNBRANDED